# Patient Record
Sex: FEMALE | Race: WHITE | NOT HISPANIC OR LATINO | Employment: PART TIME | URBAN - METROPOLITAN AREA
[De-identification: names, ages, dates, MRNs, and addresses within clinical notes are randomized per-mention and may not be internally consistent; named-entity substitution may affect disease eponyms.]

---

## 2023-08-24 PROBLEM — E55.9 VITAMIN D DEFICIENCY: Status: ACTIVE | Noted: 2023-08-24

## 2023-08-24 PROBLEM — R07.81 PLEURITIC CHEST PAIN: Status: ACTIVE | Noted: 2023-08-24

## 2023-08-24 PROBLEM — G43.909 MIGRAINE: Status: ACTIVE | Noted: 2023-08-24

## 2023-08-24 PROBLEM — K44.9 GASTROESOPHAGEAL REFLUX DISEASE WITH HIATAL HERNIA: Status: ACTIVE | Noted: 2023-08-24

## 2023-08-24 PROBLEM — F32.A DEPRESSION: Status: ACTIVE | Noted: 2022-07-13

## 2023-08-24 PROBLEM — R43.2 DECREASED SENSE OF TASTE: Status: ACTIVE | Noted: 2023-08-24

## 2023-08-24 PROBLEM — R13.10 DYSPHAGIA: Status: ACTIVE | Noted: 2023-08-24

## 2023-08-24 PROBLEM — I47.10 PAROXYSMAL SUPRAVENTRICULAR TACHYCARDIA (CMS-HCC): Status: ACTIVE | Noted: 2023-08-24

## 2023-08-24 PROBLEM — F31.9 BIPOLAR DISORDER (MULTI): Status: ACTIVE | Noted: 2023-08-24

## 2023-08-24 PROBLEM — R53.82 CHRONIC FATIGUE: Status: ACTIVE | Noted: 2022-03-01

## 2023-08-24 PROBLEM — I49.3 VENTRICULAR PREMATURE CONTRACTIONS: Status: ACTIVE | Noted: 2023-08-24

## 2023-08-24 PROBLEM — K21.9 GASTROESOPHAGEAL REFLUX DISEASE WITH HIATAL HERNIA: Status: ACTIVE | Noted: 2023-08-24

## 2023-08-24 PROBLEM — G47.52 DREAM ENACTMENT BEHAVIOR: Status: ACTIVE | Noted: 2023-08-24

## 2023-08-24 PROBLEM — I47.11 INAPPROPRIATE SINUS TACHYCARDIA (CMS-HCC): Status: ACTIVE | Noted: 2023-08-24

## 2023-08-24 PROBLEM — G44.89 OTHER HEADACHE SYNDROME: Status: ACTIVE | Noted: 2023-08-24

## 2023-08-24 PROBLEM — R10.9 ABDOMINAL PAIN: Status: ACTIVE | Noted: 2023-08-24

## 2023-08-24 PROBLEM — E16.2 HYPOGLYCEMIA: Status: ACTIVE | Noted: 2023-08-24

## 2023-08-24 PROBLEM — J30.2 SEASONAL ALLERGIES: Status: ACTIVE | Noted: 2022-07-13

## 2023-08-24 PROBLEM — I20.9 ANGINA PECTORIS (CMS-HCC): Status: ACTIVE | Noted: 2023-08-24

## 2023-08-24 PROBLEM — G25.81 RLS (RESTLESS LEGS SYNDROME): Status: ACTIVE | Noted: 2023-08-24

## 2023-08-24 PROBLEM — R00.2 PALPITATIONS: Status: ACTIVE | Noted: 2023-08-24

## 2023-08-24 PROBLEM — I31.9 PERICARDITIS (HHS-HCC): Status: ACTIVE | Noted: 2023-08-24

## 2023-08-24 PROBLEM — G47.00 INSOMNIA: Status: ACTIVE | Noted: 2023-08-24

## 2023-08-24 PROBLEM — R61 NIGHT SWEATS: Status: ACTIVE | Noted: 2023-08-24

## 2023-08-24 PROBLEM — G47.19 EXCESSIVE DAYTIME SLEEPINESS: Status: ACTIVE | Noted: 2023-08-24

## 2023-08-24 PROBLEM — G47.33 OBSTRUCTIVE SLEEP APNEA SYNDROME: Status: ACTIVE | Noted: 2023-08-24

## 2023-08-24 PROBLEM — G47.30 SLEEP APNEA: Status: ACTIVE | Noted: 2023-08-24

## 2023-08-24 PROBLEM — F51.3 SLEEPWALKING: Status: ACTIVE | Noted: 2023-08-24

## 2023-08-24 PROBLEM — R06.02 SHORTNESS OF BREATH: Status: ACTIVE | Noted: 2023-08-24

## 2023-08-24 PROBLEM — Z99.89 DEPENDENCE ON OTHER ENABLING MACHINES AND DEVICES: Status: ACTIVE | Noted: 2023-08-24

## 2023-08-24 PROBLEM — R59.1 GENERALIZED LYMPHADENOPATHY: Status: ACTIVE | Noted: 2023-08-24

## 2023-08-24 PROBLEM — F41.8 ANXIETY WITH DEPRESSION: Status: ACTIVE | Noted: 2023-08-24

## 2023-08-24 PROBLEM — J45.990 EXERCISE-INDUCED ASTHMA (HHS-HCC): Status: ACTIVE | Noted: 2023-08-24

## 2023-08-24 PROBLEM — F41.9 ANXIETY: Status: ACTIVE | Noted: 2022-03-01

## 2023-08-24 PROBLEM — G43.119 INTRACTABLE MIGRAINE EQUIVALENT: Status: ACTIVE | Noted: 2023-08-24

## 2023-08-24 RX ORDER — ACETAMINOPHEN, ASPIRIN (NSAID), AND CAFFEINE 250; 250; 65 MG/1; MG/1; MG/1
2 TABLET, FILM COATED ORAL DAILY
COMMUNITY

## 2023-08-24 RX ORDER — ALBUTEROL SULFATE 90 UG/1
1-2 AEROSOL, METERED RESPIRATORY (INHALATION) EVERY 8 HOURS PRN
COMMUNITY
Start: 2019-11-25 | End: 2023-10-18 | Stop reason: ALTCHOICE

## 2023-08-24 RX ORDER — DROSPIRENONE AND ETHINYL ESTRADIOL 0.03MG-3MG
1 KIT ORAL DAILY
COMMUNITY
Start: 2022-02-21 | End: 2023-10-18 | Stop reason: ALTCHOICE

## 2023-08-24 RX ORDER — TRAZODONE HYDROCHLORIDE 50 MG/1
0.5 TABLET ORAL NIGHTLY PRN
COMMUNITY
End: 2023-10-18 | Stop reason: SINTOL

## 2023-08-24 RX ORDER — LORAZEPAM 0.5 MG/1
1 TABLET ORAL AS NEEDED
COMMUNITY
End: 2023-10-18 | Stop reason: SINTOL

## 2023-08-24 RX ORDER — DILTIAZEM HYDROCHLORIDE 120 MG/1
1 TABLET, FILM COATED ORAL 3 TIMES DAILY
COMMUNITY
End: 2023-10-18 | Stop reason: ALTCHOICE

## 2023-08-24 RX ORDER — LEVONORGESTREL 19.5 MG/1
INTRAUTERINE DEVICE INTRAUTERINE
COMMUNITY
Start: 2022-09-16 | End: 2023-10-18 | Stop reason: ALTCHOICE

## 2023-08-24 RX ORDER — LEVOCETIRIZINE DIHYDROCHLORIDE 5 MG/1
1 TABLET, FILM COATED ORAL DAILY
COMMUNITY
Start: 2022-07-13 | End: 2023-10-18 | Stop reason: ALTCHOICE

## 2023-08-24 RX ORDER — NEBIVOLOL 5 MG/1
1 TABLET ORAL DAILY
COMMUNITY
End: 2023-10-18 | Stop reason: ALTCHOICE

## 2023-08-24 RX ORDER — ESCITALOPRAM OXALATE 5 MG/1
1 TABLET ORAL DAILY
COMMUNITY
Start: 2022-07-13 | End: 2023-10-18 | Stop reason: ALTCHOICE

## 2023-08-24 RX ORDER — BUTALBITAL, ACETAMINOPHEN AND CAFFEINE 300; 40; 50 MG/1; MG/1; MG/1
1 CAPSULE ORAL EVERY 6 HOURS PRN
COMMUNITY
End: 2023-10-18 | Stop reason: SINTOL

## 2023-08-24 RX ORDER — CYCLOBENZAPRINE HCL 10 MG
10 TABLET ORAL
COMMUNITY
Start: 2023-01-17 | End: 2023-10-18 | Stop reason: SINTOL

## 2023-08-24 RX ORDER — TOBRAMYCIN 3 MG/ML
SOLUTION/ DROPS OPHTHALMIC
COMMUNITY
Start: 2022-11-15 | End: 2023-10-18 | Stop reason: ALTCHOICE

## 2023-08-24 RX ORDER — MELOXICAM 7.5 MG/1
TABLET ORAL
COMMUNITY
Start: 2023-01-17 | End: 2023-10-18 | Stop reason: ALTCHOICE

## 2023-08-24 RX ORDER — ESCITALOPRAM OXALATE 10 MG/1
1 TABLET, FILM COATED ORAL DAILY
COMMUNITY
End: 2023-10-18 | Stop reason: ALTCHOICE

## 2023-10-18 ENCOUNTER — TELEMEDICINE (OUTPATIENT)
Dept: OBSTETRICS AND GYNECOLOGY | Facility: CLINIC | Age: 38
End: 2023-10-18
Payer: COMMERCIAL

## 2023-10-18 DIAGNOSIS — Z31.9 DESIRE FOR PREGNANCY: Primary | ICD-10-CM

## 2023-10-18 DIAGNOSIS — N91.3 PRIMARY OLIGOMENORRHEA: ICD-10-CM

## 2023-10-18 DIAGNOSIS — R52 PAIN: ICD-10-CM

## 2023-10-18 DIAGNOSIS — Z31.41 FERTILITY TESTING: ICD-10-CM

## 2023-10-18 PROCEDURE — 99213 OFFICE O/P EST LOW 20 MIN: CPT | Mod: 95 | Performed by: OBSTETRICS & GYNECOLOGY

## 2023-10-18 PROCEDURE — 99213 OFFICE O/P EST LOW 20 MIN: CPT | Performed by: OBSTETRICS & GYNECOLOGY

## 2023-10-18 RX ORDER — LETROZOLE 2.5 MG/1
TABLET, FILM COATED ORAL
Qty: 30 TABLET | Refills: 3 | Status: SHIPPED | OUTPATIENT
Start: 2023-10-18

## 2023-10-18 RX ORDER — PROGESTERONE 200 MG/1
CAPSULE ORAL
Qty: 36 CAPSULE | Refills: 3 | Status: SHIPPED | OUTPATIENT
Start: 2023-10-18

## 2023-10-18 RX ORDER — IBUPROFEN 600 MG/1
600 TABLET ORAL EVERY 6 HOURS PRN
Qty: 30 TABLET | Refills: 0 | Status: SHIPPED | OUTPATIENT
Start: 2023-10-18 | End: 2023-10-28

## 2023-10-18 RX ORDER — ACETAMINOPHEN AND CODEINE PHOSPHATE 300; 30 MG/1; MG/1
1 TABLET ORAL 2 TIMES DAILY PRN
Qty: 6 TABLET | Refills: 0 | Status: SHIPPED | OUTPATIENT
Start: 2023-10-18 | End: 2023-10-21

## 2023-10-18 RX ORDER — NUT.TX FOR PKU WITH IRON NO.13 5G-36KCAL
POWDER IN PACKET (EA) ORAL
COMMUNITY
End: 2024-05-16 | Stop reason: ALTCHOICE
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
## 2023-10-19 ENCOUNTER — TELEPHONE (OUTPATIENT)
Dept: OBSTETRICS AND GYNECOLOGY | Facility: CLINIC | Age: 38
End: 2023-10-19
Payer: COMMERCIAL

## 2023-10-19 DIAGNOSIS — Z01.818 ENCOUNTER FOR PREADMISSION TESTING: Primary | ICD-10-CM

## 2023-10-19 DIAGNOSIS — Z31.9 DESIRE FOR PREGNANCY: ICD-10-CM

## 2023-10-19 DIAGNOSIS — N91.3 PRIMARY OLIGOMENORRHEA: ICD-10-CM

## 2023-10-20 NOTE — TELEPHONE ENCOUNTER
She can start with next cycle - Progesterone days 15 through 26 and Letrozole days 3 through 7. Sent to pharmacy for her.

## 2023-10-20 NOTE — TELEPHONE ENCOUNTER
Nurse contacted pt regarding scheduling her an appointment for EMB, pap smear and also mailing out order (semen analysis) for her . While scheduling her appointment she questioned if this is with Dr. Amaya. Told pt that seeing Dr. Amaya is not on my sheet given by Dr. Morgan, but to mail instructions for HSG to patient, mail semen analysis order to her -Emmanuel Mukherjee with instructions for lab prep and schedule EMB with pap smear. Told her a message will be sent to Dr. Morgan. EMB with pap scheduled for 12/7/2023 with SG. Order and instructions mailed.

## 2023-10-25 NOTE — TELEPHONE ENCOUNTER
Radiology called back stating that they were able to schedule the patient for 11/13 but states she needs a neg HCG done at least 3 d prior to testing. Please place order for that.

## 2023-10-25 NOTE — TELEPHONE ENCOUNTER
Radiology scheduling just called and stated that the Hysterosalpingogram is not something they are able to schedule that she would need to see G[facility] for them to order and schedule. Please refer patient.

## 2023-11-09 PROBLEM — J31.0 CHRONIC RHINITIS: Status: ACTIVE | Noted: 2023-11-09

## 2023-11-10 ENCOUNTER — LAB (OUTPATIENT)
Dept: LAB | Facility: LAB | Age: 38
End: 2023-11-10
Payer: COMMERCIAL

## 2023-11-10 DIAGNOSIS — Z01.818 ENCOUNTER FOR PREADMISSION TESTING: ICD-10-CM

## 2023-11-10 LAB — HCG SERPL-ACNC: <1 MIU/ML

## 2023-11-10 PROCEDURE — 36415 COLL VENOUS BLD VENIPUNCTURE: CPT

## 2023-11-10 PROCEDURE — 84702 CHORIONIC GONADOTROPIN TEST: CPT

## 2023-11-13 ENCOUNTER — HOSPITAL ENCOUNTER (OUTPATIENT)
Dept: RADIOLOGY | Facility: HOSPITAL | Age: 38
Discharge: HOME | End: 2023-11-13
Payer: COMMERCIAL

## 2023-11-13 DIAGNOSIS — N91.3 PRIMARY OLIGOMENORRHEA: ICD-10-CM

## 2023-11-13 PROCEDURE — 2550000001 HC RX 255 CONTRASTS: Performed by: OBSTETRICS & GYNECOLOGY

## 2023-11-13 PROCEDURE — 74740 X-RAY FEMALE GENITAL TRACT: CPT

## 2023-11-13 PROCEDURE — 2720000007 HC OR 272 NO HCPCS

## 2023-11-13 RX ADMIN — IOHEXOL 5 ML: 240 INJECTION, SOLUTION INTRATHECAL; INTRAVASCULAR; INTRAVENOUS; ORAL at 11:17

## 2023-12-06 ENCOUNTER — APPOINTMENT (OUTPATIENT)
Dept: OBSTETRICS AND GYNECOLOGY | Facility: CLINIC | Age: 38
End: 2023-12-06
Payer: COMMERCIAL

## 2023-12-07 ENCOUNTER — PROCEDURE VISIT (OUTPATIENT)
Dept: OBSTETRICS AND GYNECOLOGY | Facility: CLINIC | Age: 38
End: 2023-12-07
Payer: COMMERCIAL

## 2023-12-07 VITALS — BODY MASS INDEX: 23.8 KG/M2 | WEIGHT: 143 LBS | DIASTOLIC BLOOD PRESSURE: 82 MMHG | SYSTOLIC BLOOD PRESSURE: 120 MMHG

## 2023-12-07 DIAGNOSIS — N91.3 PRIMARY OLIGOMENORRHEA: Primary | ICD-10-CM

## 2023-12-07 DIAGNOSIS — F41.9 ANXIETY: ICD-10-CM

## 2023-12-07 LAB — PREGNANCY TEST URINE, POC: NEGATIVE

## 2023-12-07 PROCEDURE — 58100 BIOPSY OF UTERUS LINING: CPT | Mod: 51 | Performed by: OBSTETRICS & GYNECOLOGY

## 2023-12-07 PROCEDURE — 58100 BIOPSY OF UTERUS LINING: CPT | Performed by: OBSTETRICS & GYNECOLOGY

## 2023-12-07 PROCEDURE — 88305 TISSUE EXAM BY PATHOLOGIST: CPT | Mod: TC,SUR | Performed by: OBSTETRICS & GYNECOLOGY

## 2023-12-07 PROCEDURE — 88305 TISSUE EXAM BY PATHOLOGIST: CPT | Performed by: PATHOLOGY

## 2023-12-07 RX ORDER — ALPRAZOLAM 0.25 MG/1
TABLET ORAL
Qty: 10 TABLET | Refills: 0 | Status: SHIPPED | OUTPATIENT
Start: 2023-12-07

## 2023-12-07 ASSESSMENT — ENCOUNTER SYMPTOMS
DEPRESSION: 0
LOSS OF SENSATION IN FEET: 0
OCCASIONAL FEELINGS OF UNSTEADINESS: 0

## 2023-12-07 ASSESSMENT — PAIN SCALES - GENERAL: PAINLEVEL: 0-NO PAIN

## 2023-12-07 ASSESSMENT — PATIENT HEALTH QUESTIONNAIRE - PHQ9
SUM OF ALL RESPONSES TO PHQ9 QUESTIONS 1 & 2: 0
1. LITTLE INTEREST OR PLEASURE IN DOING THINGS: NOT AT ALL
2. FEELING DOWN, DEPRESSED OR HOPELESS: NOT AT ALL

## 2023-12-08 NOTE — PROGRESS NOTES
Patient ID: Taty Sosa is a 38 y.o. female.    Endometrial biopsy    Date/Time: 12/7/2023 7:01 PM    Performed by: Elvira Morgan DO  Authorized by: Elvira Morgan DO    Consent:     Consent obtained: written    Consent given by: patient    Risks discussed: bleeding, pain, need for repeat procedure and infection    Alternatives discussed: observation, alternative treatment and referral    Patient agrees, verbalizes understanding, and wants to proceed: yes    Indications:     Indications: other menstrual disorder    Pre-procedure:     Urine pregnancy test: negative      Premeds: acetaminophen and NSAID    Procedure:     A bimanual exam was performed: yes      Uterus position: midposition    Prepped with: Betadine    Tenaculum used: yes      A local block was performed: yes      Block method: paracervical block      Local anesthetic: lidocaine 2% w/o epi    Amount used (mL): 3    Cervix dilated: no      Number of passes: 3  Findings:     Cervix: normal      Uterus depth by sound (cm): 7    Specimen collected: low volume sample collected      Patient tolerance: tolerated with difficulty    Patient tolerance comment: Anxiety/Pain

## 2023-12-13 LAB
LABORATORY COMMENT REPORT: NORMAL
PATH REPORT.FINAL DX SPEC: NORMAL
PATH REPORT.GROSS SPEC: NORMAL
PATH REPORT.RELEVANT HX SPEC: NORMAL
PATH REPORT.TOTAL CANCER: NORMAL

## 2024-02-08 ENCOUNTER — OFFICE VISIT (OUTPATIENT)
Dept: SLEEP MEDICINE | Facility: CLINIC | Age: 39
End: 2024-02-08
Payer: COMMERCIAL

## 2024-02-08 VITALS
SYSTOLIC BLOOD PRESSURE: 96 MMHG | HEART RATE: 62 BPM | WEIGHT: 137 LBS | HEIGHT: 65 IN | DIASTOLIC BLOOD PRESSURE: 64 MMHG | BODY MASS INDEX: 22.82 KG/M2 | OXYGEN SATURATION: 98 %

## 2024-02-08 DIAGNOSIS — G25.81 RLS (RESTLESS LEGS SYNDROME): ICD-10-CM

## 2024-02-08 DIAGNOSIS — F51.3 SLEEPWALKING: ICD-10-CM

## 2024-02-08 DIAGNOSIS — G47.33 OBSTRUCTIVE SLEEP APNEA SYNDROME: ICD-10-CM

## 2024-02-08 DIAGNOSIS — G47.10 HYPERSOMNOLENCE DISORDER: ICD-10-CM

## 2024-02-08 DIAGNOSIS — F31.9 BIPOLAR AFFECTIVE DISORDER, REMISSION STATUS UNSPECIFIED (MULTI): ICD-10-CM

## 2024-02-08 DIAGNOSIS — J31.0 CHRONIC RHINITIS: ICD-10-CM

## 2024-02-08 DIAGNOSIS — G47.52 DREAM ENACTMENT BEHAVIOR: Primary | ICD-10-CM

## 2024-02-08 PROCEDURE — 99214 OFFICE O/P EST MOD 30 MIN: CPT | Performed by: INTERNAL MEDICINE

## 2024-02-08 PROCEDURE — 1036F TOBACCO NON-USER: CPT | Performed by: INTERNAL MEDICINE

## 2024-02-08 RX ORDER — AZELASTINE 1 MG/ML
1 SPRAY, METERED NASAL 2 TIMES DAILY
Qty: 36 ML | Refills: 2 | Status: SHIPPED | OUTPATIENT
Start: 2024-02-08

## 2024-02-08 RX ORDER — FLUTICASONE PROPIONATE 50 MCG
2 SPRAY, SUSPENSION (ML) NASAL NIGHTLY
Qty: 16 G | Refills: 2 | Status: SHIPPED | OUTPATIENT
Start: 2024-02-08

## 2024-02-08 ASSESSMENT — SLEEP AND FATIGUE QUESTIONNAIRES
ESS-CHAD TOTAL SCORE: 13
HOW LIKELY ARE YOU TO NOD OFF OR FALL ASLEEP WHILE SITTING QUIETLY AFTER LUNCH WITHOUT ALCOHOL: HIGH CHANCE OF DOZING
HOW LIKELY ARE YOU TO NOD OFF OR FALL ASLEEP WHILE WATCHING TV: HIGH CHANCE OF DOZING
HOW LIKELY ARE YOU TO NOD OFF OR FALL ASLEEP WHEN YOU ARE A PASSENGER IN A CAR FOR AN HOUR WITHOUT A BREAK: WOULD NEVER DOZE
HOW LIKELY ARE YOU TO NOD OFF OR FALL ASLEEP WHILE LYING DOWN TO REST IN THE AFTERNOON WHEN CIRCUMSTANCES PERMIT: HIGH CHANCE OF DOZING
HOW LIKELY ARE YOU TO NOD OFF OR FALL ASLEEP IN A CAR, WHILE STOPPED FOR A FEW MINUTES IN TRAFFIC: WOULD NEVER DOZE
HOW LIKELY ARE YOU TO NOD OFF OR FALL ASLEEP WHILE SITTING AND READING: HIGH CHANCE OF DOZING
SITING INACTIVE IN A PUBLIC PLACE LIKE A CLASS ROOM OR A MOVIE THEATER: SLIGHT CHANCE OF DOZING
HOW LIKELY ARE YOU TO NOD OFF OR FALL ASLEEP WHILE SITTING AND TALKING TO SOMEONE: WOULD NEVER DOZE

## 2024-02-08 ASSESSMENT — PAIN SCALES - GENERAL: PAINLEVEL: 0-NO PAIN
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
## 2024-02-08 NOTE — PATIENT INSTRUCTIONS
Call  the  Sleep Center ([phone]) to speak with a sleep testing center  to book your overnight sleep study procedure at one of our adult and pediatric-friendly sleep labs. Overnight sleep studies may be scheduled on a weekday or weekend.      We have child-life services on a case-by-case basis at the [facility] location. We also perform daytime testing for shift workers on a case by case basis.     For the study: Bring usual medications and nightly routine items for your sleep study.  You may wish to bring a snack and nightly routine items you feel would be important to help you sleep (e.g. favorite pillow). Bring your sleep logs/requested paperwork to your sleep study appointment.     Results of your sleep study will be given to the ordering clinician. Please contact their office for results or followup as directed by your clinician. For additional information about the sleep medicine services, please call [phone].     Locations for sleep studies are [facility] ([facility]), [facility], [place], [place], [place], [place], [place], and [place].

## 2024-02-08 NOTE — ASSESSMENT & PLAN NOTE
Hypersomnolence with parasomnia and well treated obstructive sleep apnea.  Recommend in lab PSG on current CPAP settings with parasomnia protocol with extended EEG and upper and lower limb leads is recommended

## 2024-03-24 ENCOUNTER — HOSPITAL ENCOUNTER (EMERGENCY)
Facility: HOSPITAL | Age: 39
Discharge: OTHER INSTITUTION | End: 2024-03-24
Payer: COMMERCIAL

## 2024-03-24 PROCEDURE — 4500999001 HC ED NO CHARGE

## 2024-04-02 ENCOUNTER — APPOINTMENT (OUTPATIENT)
Dept: ENDOCRINOLOGY | Facility: CLINIC | Age: 39
End: 2024-04-02
Payer: COMMERCIAL

## 2024-04-08 ASSESSMENT — LIFESTYLE VARIABLES
TOBACCO_USE: NO
HISTORY_ALCOHOL_USE: YES

## 2024-04-09 ENCOUNTER — CONSULT (OUTPATIENT)
Dept: ENDOCRINOLOGY | Facility: CLINIC | Age: 39
End: 2024-04-09
Payer: COMMERCIAL

## 2024-04-09 VITALS
TEMPERATURE: 98.1 F | HEART RATE: 98 BPM | SYSTOLIC BLOOD PRESSURE: 117 MMHG | WEIGHT: 138 LBS | BODY MASS INDEX: 22.99 KG/M2 | DIASTOLIC BLOOD PRESSURE: 59 MMHG | HEIGHT: 65 IN

## 2024-04-09 DIAGNOSIS — Z13.71 SCREENING FOR GENETIC DISEASE CARRIER STATUS: ICD-10-CM

## 2024-04-09 DIAGNOSIS — G47.30 SLEEP APNEA, UNSPECIFIED TYPE: ICD-10-CM

## 2024-04-09 DIAGNOSIS — Z11.3 SCREENING FOR STDS (SEXUALLY TRANSMITTED DISEASES): ICD-10-CM

## 2024-04-09 DIAGNOSIS — Z31.9 DESIRE FOR PREGNANCY: ICD-10-CM

## 2024-04-09 DIAGNOSIS — Z31.41 FERTILITY TESTING: ICD-10-CM

## 2024-04-09 DIAGNOSIS — Z11.59 ENCOUNTER FOR SCREENING FOR OTHER VIRAL DISEASES: ICD-10-CM

## 2024-04-09 DIAGNOSIS — Z01.83 ENCOUNTER FOR RH BLOOD TYPING: ICD-10-CM

## 2024-04-09 DIAGNOSIS — Z13.29 SCREENING FOR THYROID DISORDER: ICD-10-CM

## 2024-04-09 DIAGNOSIS — G89.29 CHRONIC PELVIC PAIN IN FEMALE: Primary | ICD-10-CM

## 2024-04-09 DIAGNOSIS — R10.2 CHRONIC PELVIC PAIN IN FEMALE: Primary | ICD-10-CM

## 2024-04-09 DIAGNOSIS — N91.3 PRIMARY OLIGOMENORRHEA: ICD-10-CM

## 2024-04-09 LAB
ABO GROUP (TYPE) IN BLOOD: NORMAL
ANTIBODY SCREEN: NORMAL
HBV SURFACE AG SERPL QL IA: NONREACTIVE
HCV AB SER QL: NONREACTIVE
HIV 1+2 AB+HIV1 P24 AG SERPL QL IA: NONREACTIVE
RH FACTOR (ANTIGEN D): NORMAL
RUBV IGG SERPL IA-ACNC: 2.1 IA
RUBV IGG SERPL QL IA: POSITIVE
TREPONEMA PALLIDUM IGG+IGM AB [PRESENCE] IN SERUM OR PLASMA BY IMMUNOASSAY: NONREACTIVE
TSH SERPL-ACNC: 0.68 MIU/L (ref 0.44–3.98)
VARICELLA ZOSTER IGG INDEX: 2.7 IA
VZV IGG SER QL IA: POSITIVE

## 2024-04-09 PROCEDURE — 83516 IMMUNOASSAY NONANTIBODY: CPT

## 2024-04-09 PROCEDURE — 86803 HEPATITIS C AB TEST: CPT

## 2024-04-09 PROCEDURE — 84443 ASSAY THYROID STIM HORMONE: CPT

## 2024-04-09 PROCEDURE — 86850 RBC ANTIBODY SCREEN: CPT

## 2024-04-09 PROCEDURE — 99204 OFFICE O/P NEW MOD 45 MIN: CPT | Performed by: NURSE PRACTITIONER

## 2024-04-09 PROCEDURE — 87389 HIV-1 AG W/HIV-1&-2 AB AG IA: CPT

## 2024-04-09 PROCEDURE — 86787 VARICELLA-ZOSTER ANTIBODY: CPT

## 2024-04-09 PROCEDURE — 86901 BLOOD TYPING SEROLOGIC RH(D): CPT

## 2024-04-09 PROCEDURE — 86780 TREPONEMA PALLIDUM: CPT

## 2024-04-09 PROCEDURE — 86317 IMMUNOASSAY INFECTIOUS AGENT: CPT

## 2024-04-09 PROCEDURE — 99214 OFFICE O/P EST MOD 30 MIN: CPT | Performed by: NURSE PRACTITIONER

## 2024-04-09 PROCEDURE — 86900 BLOOD TYPING SEROLOGIC ABO: CPT

## 2024-04-09 PROCEDURE — 87340 HEPATITIS B SURFACE AG IA: CPT

## 2024-04-09 PROCEDURE — 36415 COLL VENOUS BLD VENIPUNCTURE: CPT

## 2024-04-09 PROCEDURE — 87800 DETECT AGNT MULT DNA DIREC: CPT

## 2024-04-09 ASSESSMENT — PATIENT HEALTH QUESTIONNAIRE - PHQ9
SUM OF ALL RESPONSES TO PHQ9 QUESTIONS 1 AND 2: 0
1. LITTLE INTEREST OR PLEASURE IN DOING THINGS: NOT AT ALL
2. FEELING DOWN, DEPRESSED OR HOPELESS: NOT AT ALL

## 2024-04-09 ASSESSMENT — ENCOUNTER SYMPTOMS
OCCASIONAL FEELINGS OF UNSTEADINESS: 0
DEPRESSION: 0
LOSS OF SENSATION IN FEET: 0

## 2024-04-09 ASSESSMENT — COLUMBIA-SUICIDE SEVERITY RATING SCALE - C-SSRS
2. HAVE YOU ACTUALLY HAD ANY THOUGHTS OF KILLING YOURSELF?: NO
1. IN THE PAST MONTH, HAVE YOU WISHED YOU WERE DEAD OR WISHED YOU COULD GO TO SLEEP AND NOT WAKE UP?: NO
6. HAVE YOU EVER DONE ANYTHING, STARTED TO DO ANYTHING, OR PREPARED TO DO ANYTHING TO END YOUR LIFE?: NO

## 2024-04-09 ASSESSMENT — PAIN SCALES - GENERAL: PAINLEVEL: 5
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
## 2024-04-10 LAB
C TRACH RRNA SPEC QL NAA+PROBE: NEGATIVE
N GONORRHOEA DNA SPEC QL PROBE+SIG AMP: NEGATIVE

## 2024-04-11 ENCOUNTER — LAB (OUTPATIENT)
Dept: LAB | Facility: LAB | Age: 39
End: 2024-04-11
Payer: COMMERCIAL

## 2024-04-11 DIAGNOSIS — G47.10 HYPERSOMNOLENCE DISORDER: ICD-10-CM

## 2024-04-11 LAB
AMPHETAMINES UR QL SCN: NORMAL
BARBITURATES UR QL SCN: NORMAL
BENZODIAZ UR QL SCN: NORMAL
BZE UR QL SCN: NORMAL
CANNABINOIDS UR QL SCN: NORMAL
FENTANYL+NORFENTANYL UR QL SCN: NORMAL
METHADONE UR QL SCN: NORMAL
OPIATES UR QL SCN: NORMAL
OXYCODONE+OXYMORPHONE UR QL SCN: NORMAL
PCP UR QL SCN: NORMAL

## 2024-04-11 PROCEDURE — 80307 DRUG TEST PRSMV CHEM ANLYZR: CPT

## 2024-04-12 ENCOUNTER — TELEPHONE (OUTPATIENT)
Dept: ENDOCRINOLOGY | Facility: CLINIC | Age: 39
End: 2024-04-12
Payer: COMMERCIAL

## 2024-04-12 LAB — MIS SERPL-MCNC: 1.89 NG/ML (ref 0.18–11.71)

## 2024-04-12 NOTE — TELEPHONE ENCOUNTER
Reason for call: Call Back  Notes: Pt was told by her doctor she's unable to be scheduled for a PAP until August but does not want to delay her cycle.    Phone call to patient. She is hoping to get in for a PAP sooner than August to proceed with treatment. Recommend calling central scheduling to see if there is any sooner appt. Will also reach out to Moriah for suggestions to get her in sooner.   Yisel MAGANA  04/12/24   3:20 PM

## 2024-04-16 ENCOUNTER — APPOINTMENT (OUTPATIENT)
Dept: RADIOLOGY | Facility: HOSPITAL | Age: 39
End: 2024-04-16
Payer: COMMERCIAL

## 2024-04-16 ENCOUNTER — HOSPITAL ENCOUNTER (EMERGENCY)
Facility: HOSPITAL | Age: 39
Discharge: HOME | End: 2024-04-16
Attending: EMERGENCY MEDICINE
Payer: COMMERCIAL

## 2024-04-16 ENCOUNTER — APPOINTMENT (OUTPATIENT)
Dept: CARDIOLOGY | Facility: HOSPITAL | Age: 39
End: 2024-04-16
Payer: COMMERCIAL

## 2024-04-16 VITALS
SYSTOLIC BLOOD PRESSURE: 128 MMHG | BODY MASS INDEX: 21.99 KG/M2 | HEIGHT: 65 IN | DIASTOLIC BLOOD PRESSURE: 70 MMHG | HEART RATE: 68 BPM | RESPIRATION RATE: 18 BRPM | OXYGEN SATURATION: 100 % | TEMPERATURE: 37.1 F | WEIGHT: 132 LBS

## 2024-04-16 DIAGNOSIS — R07.81 PLEURITIC CHEST PAIN: Primary | ICD-10-CM

## 2024-04-16 DIAGNOSIS — M94.0 COSTOCHONDRITIS, ACUTE: ICD-10-CM

## 2024-04-16 LAB
ALBUMIN SERPL BCP-MCNC: 5.1 G/DL (ref 3.4–5)
ANION GAP SERPL CALC-SCNC: 17 MMOL/L (ref 10–20)
ATRIAL RATE: 70 BPM
BASOPHILS # BLD AUTO: 0.05 X10*3/UL (ref 0–0.1)
BASOPHILS NFR BLD AUTO: 0.8 %
BUN SERPL-MCNC: 14 MG/DL (ref 6–23)
CALCIUM SERPL-MCNC: 10.1 MG/DL (ref 8.6–10.3)
CARDIAC TROPONIN I PNL SERPL HS: 3 NG/L (ref 0–13)
CHLORIDE SERPL-SCNC: 102 MMOL/L (ref 98–107)
CO2 SERPL-SCNC: 27 MMOL/L (ref 21–32)
CREAT SERPL-MCNC: 0.93 MG/DL (ref 0.5–1.05)
D DIMER PPP FEU-MCNC: 228 NG/ML FEU
EGFRCR SERPLBLD CKD-EPI 2021: 82 ML/MIN/1.73M*2
EOSINOPHIL # BLD AUTO: 0.31 X10*3/UL (ref 0–0.7)
EOSINOPHIL NFR BLD AUTO: 4.9 %
ERYTHROCYTE [DISTWIDTH] IN BLOOD BY AUTOMATED COUNT: 11.9 % (ref 11.5–14.5)
GLUCOSE SERPL-MCNC: 84 MG/DL (ref 74–99)
HCT VFR BLD AUTO: 38.1 % (ref 36–46)
HGB BLD-MCNC: 12.9 G/DL (ref 12–16)
IMM GRANULOCYTES # BLD AUTO: 0.01 X10*3/UL (ref 0–0.7)
IMM GRANULOCYTES NFR BLD AUTO: 0.2 % (ref 0–0.9)
LYMPHOCYTES # BLD AUTO: 1.58 X10*3/UL (ref 1.2–4.8)
LYMPHOCYTES NFR BLD AUTO: 25 %
MCH RBC QN AUTO: 31.3 PG (ref 26–34)
MCHC RBC AUTO-ENTMCNC: 33.9 G/DL (ref 32–36)
MCV RBC AUTO: 93 FL (ref 80–100)
MONOCYTES # BLD AUTO: 0.49 X10*3/UL (ref 0.1–1)
MONOCYTES NFR BLD AUTO: 7.8 %
NEUTROPHILS # BLD AUTO: 3.87 X10*3/UL (ref 1.2–7.7)
NEUTROPHILS NFR BLD AUTO: 61.3 %
NRBC BLD-RTO: 0 /100 WBCS (ref 0–0)
P AXIS: 88 DEGREES
P OFFSET: 182 MS
P ONSET: 151 MS
PHOSPHATE SERPL-MCNC: 3.3 MG/DL (ref 2.5–4.9)
PLATELET # BLD AUTO: 297 X10*3/UL (ref 150–450)
POTASSIUM SERPL-SCNC: 3.5 MMOL/L (ref 3.5–5.3)
PR INTERVAL: 144 MS
Q ONSET: 223 MS
QRS COUNT: 12 BEATS
QRS DURATION: 68 MS
QT INTERVAL: 394 MS
QTC CALCULATION(BAZETT): 425 MS
QTC FREDERICIA: 415 MS
R AXIS: 80 DEGREES
RBC # BLD AUTO: 4.12 X10*6/UL (ref 4–5.2)
SODIUM SERPL-SCNC: 142 MMOL/L (ref 136–145)
T AXIS: 60 DEGREES
T OFFSET: 420 MS
VENTRICULAR RATE: 70 BPM
WBC # BLD AUTO: 6.3 X10*3/UL (ref 4.4–11.3)

## 2024-04-16 PROCEDURE — 93005 ELECTROCARDIOGRAM TRACING: CPT

## 2024-04-16 PROCEDURE — 36415 COLL VENOUS BLD VENIPUNCTURE: CPT

## 2024-04-16 PROCEDURE — 99283 EMERGENCY DEPT VISIT LOW MDM: CPT | Mod: 25

## 2024-04-16 PROCEDURE — 80069 RENAL FUNCTION PANEL: CPT

## 2024-04-16 PROCEDURE — 85025 COMPLETE CBC W/AUTO DIFF WBC: CPT

## 2024-04-16 PROCEDURE — 71046 X-RAY EXAM CHEST 2 VIEWS: CPT

## 2024-04-16 PROCEDURE — 2500000001 HC RX 250 WO HCPCS SELF ADMINISTERED DRUGS (ALT 637 FOR MEDICARE OP)

## 2024-04-16 PROCEDURE — 84484 ASSAY OF TROPONIN QUANT: CPT

## 2024-04-16 PROCEDURE — 71046 X-RAY EXAM CHEST 2 VIEWS: CPT | Mod: FOREIGN READ | Performed by: RADIOLOGY

## 2024-04-16 PROCEDURE — 85379 FIBRIN DEGRADATION QUANT: CPT

## 2024-04-16 RX ORDER — METHOCARBAMOL 500 MG/1
500 TABLET, FILM COATED ORAL ONCE
Status: COMPLETED | OUTPATIENT
Start: 2024-04-16 | End: 2024-04-16

## 2024-04-16 RX ORDER — METHOCARBAMOL 500 MG/1
500 TABLET, FILM COATED ORAL 3 TIMES DAILY
Qty: 21 TABLET | Refills: 0 | Status: SHIPPED | OUTPATIENT
Start: 2024-04-16 | End: 2024-04-23

## 2024-04-16 RX ORDER — NAPROXEN 500 MG/1
500 TABLET ORAL
Qty: 30 TABLET | Refills: 0 | Status: SHIPPED | OUTPATIENT
Start: 2024-04-16 | End: 2024-05-01

## 2024-04-16 RX ADMIN — METHOCARBAMOL TABLETS 500 MG: 500 TABLET, COATED ORAL at 10:52

## 2024-04-16 ASSESSMENT — PAIN - FUNCTIONAL ASSESSMENT: PAIN_FUNCTIONAL_ASSESSMENT: 0-10

## 2024-04-16 ASSESSMENT — PAIN DESCRIPTION - DESCRIPTORS: DESCRIPTORS: PRESSURE

## 2024-04-16 ASSESSMENT — COLUMBIA-SUICIDE SEVERITY RATING SCALE - C-SSRS
1. IN THE PAST MONTH, HAVE YOU WISHED YOU WERE DEAD OR WISHED YOU COULD GO TO SLEEP AND NOT WAKE UP?: NO
2. HAVE YOU ACTUALLY HAD ANY THOUGHTS OF KILLING YOURSELF?: NO
6. HAVE YOU EVER DONE ANYTHING, STARTED TO DO ANYTHING, OR PREPARED TO DO ANYTHING TO END YOUR LIFE?: NO

## 2024-04-16 ASSESSMENT — PAIN SCALES - GENERAL: PAINLEVEL_OUTOF10: 7

## 2024-04-16 ASSESSMENT — PAIN DESCRIPTION - LOCATION: LOCATION: BACK

## 2024-04-16 NOTE — DISCHARGE INSTRUCTIONS
You were seen today for chest pain and shortness of breath, you likely have a degree of pleurisy or pleuritic chest pain.  We did test to look for a pulmonary embolism given your recent travel, which was negative.  This is reassuring, however, does not necessarily give us a clear answer as to why you are having chest pain.  If you are still having chest pain, I would recommend you follow-up with your primary care doctor for continued evaluation and treatment.  I will send home with a prescription for muscle relaxers as you felt that this helped here in the emergency department

## 2024-04-16 NOTE — ED PROVIDER NOTES
HPI   Chief Complaint   Patient presents with    Back Pain    Shortness of Breath       38-year-old female past medical history of a recent URI and travel to [place] who presents today for chest pain and shortness of breath.  Patient states that she returned from [place] on the 20th 21 March, and after returning was very ill.  She states that she has been feeling better since then, however, has been having chest pain with deep breathing as well as back pain.  She does also endorse a residual cough, but no sore throat difficulty swallowing or speaking.  No sore throat, fever, neck stiffness.  She does endorse a decrease in her appetite, but no difficulty eating.  Denies any numbness weakness tingling in arms or legs.  She states that she has previously been seen in urgent care for her symptoms.  Given the fact that she had continued symptoms, she was sent here for further evaluation for possible PE.  She denies any history of blood clots, no hormone therapy, no history of cancer.                          Hague Coma Scale Score: 15                     Patient History   Past Medical History:   Diagnosis Date    Anxiety     Asthma ([facility])     Depression     GERD (gastroesophageal reflux disease)     Migraine     CHUCK on CPAP     Palpitations     PTSD (post-traumatic stress disorder)     RLS (restless legs syndrome)     Tachycardia     Vitamin D deficiency      Past Surgical History:   Procedure Laterality Date    LAPAROSCOPIC NISSEN FUNDOPLICATION N/A 08/07/2017    MOUTH SURGERY      OTHER SURGICAL HISTORY  01/12/2016    EP Study     Family History   Problem Relation Name Age of Onset    Mental illness Mother 65     Hypertension Mother 65     Depression Mother 65     Anxiety disorder Mother 65     Other (prediabetes) Mother 65     Hypertension Father 62     Depression Father 62     Anxiety disorder Father 62     Stroke Father 62 60    Mental illness Father 62     Hypertension Other      Heart disease Other       Social  History     Tobacco Use    Smoking status: Never     Passive exposure: Never    Smokeless tobacco: Never   Vaping Use    Vaping status: Never Used   Substance Use Topics    Alcohol use: Yes     Comment: Once monthly    Drug use: Never       Physical Exam   ED Triage Vitals [04/16/24 0934]   Temperature Heart Rate Respirations BP   36.2 °C (97.2 °F) 80 18 128/84      Pulse Ox Temp Source Heart Rate Source Patient Position   100 % Temporal -- --      BP Location FiO2 (%)     -- --       Physical Exam  Constitutional:       General: She is not in acute distress.     Appearance: She is well-developed and normal weight. She is not toxic-appearing or diaphoretic.      Interventions: She is not intubated.  HENT:      Head: Normocephalic and atraumatic.      Mouth/Throat:      Mouth: Mucous membranes are moist.      Pharynx: No pharyngeal swelling or oropharyngeal exudate.   Eyes:      Extraocular Movements: Extraocular movements intact.      Pupils: Pupils are equal, round, and reactive to light.   Neck:      Thyroid: No thyromegaly.   Cardiovascular:      Rate and Rhythm: Normal rate and regular rhythm.      Heart sounds: No murmur heard.     No friction rub. No gallop.      Comments: 2+ radial and PT pulses bilaterally  Pulmonary:      Effort: Pulmonary effort is normal. No tachypnea, accessory muscle usage or respiratory distress. She is not intubated.      Breath sounds: Normal breath sounds. No decreased breath sounds, wheezing, rhonchi or rales.   Chest:      Chest wall: No tenderness or edema.   Abdominal:      General: Bowel sounds are normal.      Palpations: Abdomen is soft. There is no mass.      Tenderness: There is no abdominal tenderness. There is no guarding.   Musculoskeletal:         General: Normal range of motion.      Cervical back: Normal range of motion.      Right lower leg: No tenderness.      Left lower leg: No tenderness.   Lymphadenopathy:      Cervical: No cervical adenopathy.   Skin:      General: Skin is warm and dry.      Capillary Refill: Capillary refill takes less than 2 seconds.      Findings: No erythema.      Nails: There is no clubbing.   Neurological:      General: No focal deficit present.      Mental Status: She is alert and oriented to person, place, and time.      Cranial Nerves: No cranial nerve deficit.   Psychiatric:         Mood and Affect: Mood normal. Mood is not anxious.         Behavior: Behavior normal. Behavior is not agitated.         ED Course & MDM   Diagnoses as of 04/18/24 1604   Pleuritic chest pain   Costochondritis, acute       Medical Decision Making  38-year-old female past medical history of recent URI who presents today for pleuritic chest pain for the last 2 weeks.  Patient symptoms are likely musculoskeletal in nature versus costochondritis given the fact that she a preceding illness and has had pain since then.  My suspicion for her having a DVT or PE would be a low, however, given the fact that she was sent here for PE rule out, we will get a D-dimer as well as basic labs to confirm she has no other recent or chest pain or fatigue.  Addition, we will get a chest x-ray.  Patient's chest x-ray is normal, her labs are all within normal limits with a negative D-dimer.  Given this, we do not feel that she requires a PET scan at this time.  Patient did also symptomatically improved with Toradol and methocarbamol.  Given this, we will discharge her home with naproxen and methocarbamol.  All questions were answered prior to discharge, return precaution provided        Procedure  Procedures     Percy Woodard MD  Resident  04/18/24 9207

## 2024-04-16 NOTE — ED TRIAGE NOTES
Pt. Presents to the ED from urgent care. States she was sent here to rule out any blood clots. Pt. Complain of back pain that started 4/1. That has been more severe. States she has been having SOB and hard time breathing.  States she recently traveled to [place] and has been sick since. Denies CP. EKG completed in triage.

## 2024-04-17 ENCOUNTER — ANCILLARY PROCEDURE (OUTPATIENT)
Dept: ENDOCRINOLOGY | Facility: CLINIC | Age: 39
End: 2024-04-17
Payer: COMMERCIAL

## 2024-04-17 DIAGNOSIS — Z31.41 FERTILITY TESTING: ICD-10-CM

## 2024-04-17 PROCEDURE — 76830 TRANSVAGINAL US NON-OB: CPT | Performed by: OBSTETRICS & GYNECOLOGY

## 2024-04-17 PROCEDURE — 76830 TRANSVAGINAL US NON-OB: CPT

## 2024-05-06 ENCOUNTER — OFFICE VISIT (OUTPATIENT)
Dept: PRIMARY CARE | Facility: CLINIC | Age: 39
End: 2024-05-06
Payer: COMMERCIAL

## 2024-05-06 VITALS
HEART RATE: 90 BPM | OXYGEN SATURATION: 98 % | BODY MASS INDEX: 22.66 KG/M2 | DIASTOLIC BLOOD PRESSURE: 66 MMHG | WEIGHT: 136 LBS | HEIGHT: 65 IN | SYSTOLIC BLOOD PRESSURE: 120 MMHG

## 2024-05-06 DIAGNOSIS — Z00.00 ROUTINE GENERAL MEDICAL EXAMINATION AT A HEALTH CARE FACILITY: Primary | ICD-10-CM

## 2024-05-06 PROCEDURE — 1036F TOBACCO NON-USER: CPT | Performed by: PHYSICIAN ASSISTANT

## 2024-05-06 PROCEDURE — 99395 PREV VISIT EST AGE 18-39: CPT | Performed by: PHYSICIAN ASSISTANT

## 2024-05-06 ASSESSMENT — PAIN SCALES - GENERAL: PAINLEVEL: 6

## 2024-05-06 ASSESSMENT — PATIENT HEALTH QUESTIONNAIRE - PHQ9
1. LITTLE INTEREST OR PLEASURE IN DOING THINGS: NOT AT ALL
SUM OF ALL RESPONSES TO PHQ9 QUESTIONS 1 AND 2: 0
2. FEELING DOWN, DEPRESSED OR HOPELESS: NOT AT ALL

## 2024-05-06 NOTE — PROGRESS NOTES
"Subjective   Patient ID: Taty Sosa is a 38 y.o. female who presents for Annual Exam (Patient states mid back pain x6 weeks. Was taking muscle relaxer and naproxen, not affective//bp ).    Presents for RPE.   Has had back/chest pain after severe URI -   Was on a  cruise prior to illness.   Labs and imaging reviewed          Review of Systems   All other systems reviewed and are negative.      Objective   /66 (BP Location: Left arm, Patient Position: Sitting)   Pulse 90   Ht 1.651 m (5' 5\")   Wt 61.7 kg (136 lb)   LMP 04/07/2024 (Exact Date)   SpO2 98%   BMI 22.63 kg/m²     Physical Exam  Constitutional:       Appearance: Normal appearance.   Cardiovascular:      Rate and Rhythm: Normal rate and regular rhythm.      Heart sounds: Normal heart sounds.   Pulmonary:      Breath sounds: Normal breath sounds.   Neurological:      Mental Status: She is alert.         Assessment/Plan   Diagnoses and all orders for this visit:  Routine general medical examination at a health care facility         "

## 2024-05-16 ENCOUNTER — OFFICE VISIT (OUTPATIENT)
Dept: OBSTETRICS AND GYNECOLOGY | Facility: CLINIC | Age: 39
End: 2024-05-16
Payer: COMMERCIAL

## 2024-05-16 VITALS
SYSTOLIC BLOOD PRESSURE: 108 MMHG | BODY MASS INDEX: 23.46 KG/M2 | HEIGHT: 65 IN | WEIGHT: 140.8 LBS | DIASTOLIC BLOOD PRESSURE: 70 MMHG

## 2024-05-16 DIAGNOSIS — Z72.51 UNPROTECTED SEX: ICD-10-CM

## 2024-05-16 DIAGNOSIS — Z11.51 ENCOUNTER FOR SCREENING FOR HUMAN PAPILLOMAVIRUS (HPV): ICD-10-CM

## 2024-05-16 DIAGNOSIS — Z31.9 DESIRE FOR PREGNANCY: ICD-10-CM

## 2024-05-16 DIAGNOSIS — Z12.4 CERVICAL CANCER SCREENING: ICD-10-CM

## 2024-05-16 DIAGNOSIS — Z01.419 WELL WOMAN EXAM: Primary | ICD-10-CM

## 2024-05-16 PROCEDURE — 99395 PREV VISIT EST AGE 18-39: CPT | Performed by: OBSTETRICS & GYNECOLOGY

## 2024-05-16 PROCEDURE — 87661 TRICHOMONAS VAGINALIS AMPLIF: CPT | Performed by: OBSTETRICS & GYNECOLOGY

## 2024-05-16 PROCEDURE — 88175 CYTOPATH C/V AUTO FLUID REDO: CPT | Mod: TC,WESLAB | Performed by: OBSTETRICS & GYNECOLOGY

## 2024-05-16 PROCEDURE — 87624 HPV HI-RISK TYP POOLED RSLT: CPT | Performed by: OBSTETRICS & GYNECOLOGY

## 2024-05-16 PROCEDURE — 87491 CHLMYD TRACH DNA AMP PROBE: CPT | Performed by: OBSTETRICS & GYNECOLOGY

## 2024-05-16 RX ORDER — LORATADINE 10 MG/1
10 TABLET ORAL DAILY
COMMUNITY

## 2024-05-16 RX ORDER — DIPHENHYDRAMINE HCL 25 MG
25 TABLET ORAL NIGHTLY PRN
COMMUNITY

## 2024-05-16 ASSESSMENT — LIFESTYLE VARIABLES
AUDIT-C TOTAL SCORE: 3
HOW OFTEN DO YOU HAVE SIX OR MORE DRINKS ON ONE OCCASION: NEVER
HOW OFTEN DO YOU HAVE A DRINK CONTAINING ALCOHOL: 2-3 TIMES A WEEK
HOW MANY STANDARD DRINKS CONTAINING ALCOHOL DO YOU HAVE ON A TYPICAL DAY: 1 OR 2
SKIP TO QUESTIONS 9-10: 1

## 2024-05-16 ASSESSMENT — ENCOUNTER SYMPTOMS
DEPRESSION: 0
OCCASIONAL FEELINGS OF UNSTEADINESS: 0
LOSS OF SENSATION IN FEET: 0

## 2024-05-16 ASSESSMENT — PATIENT HEALTH QUESTIONNAIRE - PHQ9
2. FEELING DOWN, DEPRESSED OR HOPELESS: NOT AT ALL
SUM OF ALL RESPONSES TO PHQ9 QUESTIONS 1 & 2: 0
1. LITTLE INTEREST OR PLEASURE IN DOING THINGS: NOT AT ALL

## 2024-05-16 ASSESSMENT — PAIN SCALES - GENERAL: PAINLEVEL: 0-NO PAIN

## 2024-05-16 NOTE — PROGRESS NOTES
ESTABLISHED ANNUAL GYN VISIT     Patient Name:  Taty Sosa  :  1985  MR #:  [id number]  Acct #:  [id number]      ASSESSMENT/PLAN:     1. Well woman exam      2. Cervical cancer screening    - THINPREP PAP TEST  - HPV DNA High Risk With Genotype  - C. Trachomatis / N. Gonorrhoeae, Amplified Detection  - Trichomonas vaginalis, Nucleic Acid Detection    3. Encounter for screening for human papillomavirus (HPV)    - THINPREP PAP TEST  - HPV DNA High Risk With Genotype  - C. Trachomatis / N. Gonorrhoeae, Amplified Detection  - Trichomonas vaginalis, Nucleic Acid Detection    4. Desire for pregnancy    - Referral to [facility]; Future    5. Unprotected sex    - THINPREP PAP TEST  - HPV DNA High Risk With Genotype  - C. Trachomatis / N. Gonorrhoeae, Amplified Detection  - Trichomonas vaginalis, Nucleic Acid Detection       .All questions answered.  Breast self exam technique reviewed and patient encouraged to perform self-exam monthly.  Discussed healthy lifestyle modifications.  Follow up in 1 year.  Thin prep Pap smear.    Counseling:  Medication education:  Education:  All new and/or current medications discussed and reviewed including side effects with patient/caregiver, Understanding:  Caregiver/Patient expressed understanding., Adherence:  Barriers to adherence identified and discussed if present,     OB/GYN Preventive:  Pap smear indicated every 5 years if normal and otherwise low risk. Self breast exam monthly and clinical breast examination yearly discussed.  Diet/Weight management discussed.  Screening colonoscopy recommended starting age 45, then Q3-10 years depending on testing and family history.  Osteoporosis prevention discussion included vit d3/calcium supplements, weight-bearing exercise.  Genitourinary skin hygiene discussed.      Chief Complaint:  Annual exam    HPI:  Taty Sosa is a 38 y.o.  Patient's last menstrual period was 2024 (exact date).  female  who presents for annual. She desires pregnancy.      GYNH:   MENARCHE: 14  HPV Vaccine Yes - .  Sexual activity Yes - . [place] yes    Past Medical History:   Diagnosis Date    Anxiety     Asthma (HHS-HCC)     Depression     GERD (gastroesophageal reflux disease)     Migraine     CHUCK on CPAP     Palpitations     PTSD (post-traumatic stress disorder)     RLS (restless legs syndrome)     Tachycardia     Vitamin D deficiency        Past Surgical History:   Procedure Laterality Date    LAPAROSCOPIC NISSEN FUNDOPLICATION N/A 2017    MOUTH SURGERY      OTHER SURGICAL HISTORY  2016    EP Study       Social History     Tobacco Use    Smoking status: Never     Passive exposure: Never    Smokeless tobacco: Never   Vaping Use    Vaping status: Never Used   Substance Use Topics    Alcohol use: Yes     Comment: Once monthly    Drug use: Never        Family History   Problem Relation Name Age of Onset    Mental illness Mother 65     Hypertension Mother 65     Depression Mother 65     Anxiety disorder Mother 65     Other (prediabetes) Mother 65     Hypertension Father 62     Depression Father 62     Anxiety disorder Father 62     Stroke Father 62 60    Mental illness Father 62     Hypertension Other      Heart disease Other         OB History          0    Para   0    Term   0       0    AB   0    Living   0         SAB   0    IAB   0    Ectopic   0    Multiple   0    Live Births   0                  Prior to Admission medications    Medication Sig Start Date End Date Taking? Authorizing Provider   ALPRAZolam (Xanax) 0.25 mg tablet Take 1 tablet PO twice a day as needed for anxiety or for sleep. 23  Yes Elvira Morgan, DO   aspirin-acetaminophen-caffeine (Excedrin Extra Strength) 250-250-65 mg tablet Take 2 tablets by mouth once daily.   Yes Historical Provider, MD   azelastine (Astelin) 137 mcg (0.1 %) nasal spray Administer 1 spray into each nostril 2 times a day. Use in each nostril as  "directed 2/8/24  Yes Ricky Zuñiga MD   diphenhydrAMINE (Sominex) 25 mg tablet Take 1 tablet (25 mg) by mouth as needed at bedtime for sleep.   Yes Historical Provider, MD   fluticasone (Flonase) 50 mcg/actuation nasal spray Administer 2 sprays into each nostril once daily at bedtime. Shake gently. Before first use, prime pump. After use, clean tip and replace cap. 2/8/24  Yes Ricky Zuñiga MD   loratadine (Claritin) 10 mg tablet Take 1 tablet (10 mg) by mouth once daily.   Yes Historical Provider, MD   PRENATAL 2-IRON-FOLIC ACID-OM3 ORAL Prenatal Adult Gummy/DHA/FA   Yes Historical Provider, MD   letrozole (Femara) 2.5 mg tablet Take with or without food orally on cycle days 3 through 7 monthly. Hold if positive pregnancy test. 10/18/23   Elvira Morgan DO   methocarbamol (Robaxin) 500 mg tablet Take 1 tablet (500 mg) by mouth 3 times a day for 7 days. As needed for pain 4/16/24 4/23/24  Percy Woodard MD   progesterone (Prometrium) 200 mg capsule Take 1 capsule PO every 12 hours on menstrual cycle days 15 through 26. 10/18/23   Elvira Morgan DO   phenylalanine 50 mg powder in packet as directed Orally Prn  5/16/24  Historical Provider, MD       Allergies   Allergen Reactions    Procaine Palpitations    Adhesive Rash    Adhesive Tape-Silicones Rash and Other     Redness - Irritation       ROS:   W[facility] - WOMEN ONLY:          Breast Lump No acute changes.  Hot Flashes Occasional.  Painful [place] no.  Vaginal Discharge no.       [facility] - ROS Update:          Unexplained Weight Change no.  Pain anywhere in your body no.  Black or bloody stools no.  Problems with urination no.  Rashes or sores no.  Sexual problems no.  Depression or anxiety problems no.  Do you feel threatened by anyone No.      OBJECTIVE:   /70   Ht 1.651 m (5' 5\")   Wt 63.9 kg (140 lb 12.8 oz)   LMP 05/05/2024 (Exact Date)   BMI 23.43 kg/m²   Body mass index is 23.43 kg/m².     Physical Exam  GENERAL:   General Appearance:  " well-developed, well-nourished, no functional handicap, well-groomed.  Hygiene:  good.  Ill-appearance:  none.  Mental Status:  alert and oriented.  Speech:  clear.  Eye contact:  normal.  Appears stated age:  yes.    LUNGS:  CTAB.  Effort:  no respiratory distress.    HEART:  HRRR with S1/S2 w/o M/C/R  BREASTS: General:  no masses, no tenderness, no skin changes, no nipple abnormality, and no axillary lymphadenopathy.   ABDOMEN: Tenderness:  none.  Distention:  none.   GENITOURINARY - FEMALE: Bladder:  normal.  Pelvic support defects:  not seen .  External genitalia:  Normal.  Urethra:  Normal.  Vagina:  no lesions, moderate discharge; STI screens collected - Yes - .  Cervix/ cuff:  normal appearance .  Uterus:  normal size/shape/consistency, non tender.  Adnexa:  normal , non tender.   DERMATOLOGY:  Skin:  normal.   EXTREMITIES: Normal:  no anomalies.  Edema:  none.    NEUROLOGICAL: Orientation:  alert and oriented x 3.   PSYCHOLOGY: Affect:  appropriate.  Mood:  pleasant.    Labs reviewed:  Yes -     Imaging reviewed:  Yes -     Note: This dictation was generated using Dragon voice recognition software. Please excuse any grammatical or spelling errors that may have occurred using the system.

## 2024-05-18 LAB
C TRACH RRNA SPEC QL NAA+PROBE: NEGATIVE
N GONORRHOEA DNA SPEC QL PROBE+SIG AMP: NEGATIVE
T VAGINALIS RRNA SPEC QL NAA+PROBE: NEGATIVE

## 2024-05-21 ENCOUNTER — TELEMEDICINE (OUTPATIENT)
Dept: ENDOCRINOLOGY | Facility: CLINIC | Age: 39
End: 2024-05-21
Payer: COMMERCIAL

## 2024-05-21 DIAGNOSIS — N97.9 FEMALE INFERTILITY: Primary | ICD-10-CM

## 2024-05-21 PROCEDURE — 1036F TOBACCO NON-USER: CPT | Performed by: NURSE PRACTITIONER

## 2024-05-21 PROCEDURE — 99214 OFFICE O/P EST MOD 30 MIN: CPT | Performed by: NURSE PRACTITIONER

## 2024-05-21 NOTE — PROGRESS NOTES
Virtual or Telephone Consent: An interactive audio and video telecommunication system which permits real time communications between the patient (at the originating site) and provider (at the distant site) was utilized to provide this telehealth service    Follow Up Visit HPI    Patient is a 38 y.o.  female with  primary infertility and male factor  presenting today for follow up visit. They have been trying to conceive x 10 months.    Testing to date:   Result Date Done   TSH: 0.68 (Ref range: 0.44 - 3.98 mIU/L) 2024   AMH: 1.894 (Ref range: 0.176 - 11.705 ng/mL) 2024   PRL: 12.9 (Ref range: 4.8 - 23.3 NG/ML) 2023   Testosterone: No results found for requested labs within last 365 days. No results found for requested labs within last 365 days.   DHEAS: 166  Reference range: 12  to  379  Unit: ug/dL    MATURITY-BASED REFERENCE RANGES:        PUBERTAL (JANETH) STAGE    MALE      FEMALE       ---------------------------------------------------                 I           5 - 265     5 - 125                  II          15 - 380    15 - 150                 III          60 - 505    20 - 535                            IV          65 - 560    35 - 485                      V         165 - 500    75 - 530       Biotin interference may cause falsely elevated results.   Patients taking a Biotin dose of up to 5 mg/day should   refrain from taking Biotin for 24 hours before sample   collection. Providers may contact their local laboratory  for further information.  Test performed at:  [facility] [id number] [facility] [address]   (Ref range: ) 2023   Other:   Component      Latest Ref Rng 2024   ABO TYPE A    Rh Type POS    ANTIBODY SCREEN NEG    Rubella, IgG      Negative  Positive    Rubella, IgG Index      <=0.7 IA IA 2.1    Varicella Zoster, IgG      Negative  Positive !    Varicella Zoster, IgG Index      <=0.8 IA 2.7 (H)    Anti-Mullerian Hormone      0.176 - 11.705 ng/mL 1.894    Hepatitis B  Surface AG      Nonreactive  Nonreactive    Hepatitis C AB      Nonreactive  Nonreactive    HIV 1/2 Antigen/Antibody Screen with Reflex to Confirmation      Nonreactive  Nonreactive    Syphilis Total Ab      Nonreactive  Nonreactive    Thyroid Stimulating Hormone      0.44 - 3.98 mIU/L 0.68       Legend:  ! Abnormal  (H) High    Hysterosalpingogram: FL HYSTEROSALPINGOGRAM (11/13/2023):   IMPRESSION:  Normal HSG with bilateral fallopian tube patency demonstrated.  Saline Infused Sonography: n/a  GYN Pelvic Ultrasound: US PELVIS TRANSVAGINAL (4/18/2024):   Fertility Testing  Impression  =========     Unremarkable pelvic survey.  Follow-up  ========     Follow-up with ordering Provider. A repeat evaluation is recommended in __ weeks.  Uterus  ======     Uterus:     Visualized  Uterus position:  anteverted  Myometrium: normal  Endometrium:      trilaminar  Cervix details:   normal  Uterus width      33.8 mm  Uterus height     20.6 mm  Endometrial thickness, total  4.5 mm  Right Ovary  =========     Rt ovary:   Visualized  Rt ovary morphology:    normal  Rt ovary D1 24.8 mm  Rt ovary D2 19.1 mm  Rt ovary D3 17.9 mm  Rt ovary Vol      4.4 cmï¿½  Rt ovarian follicle(s): Follicles identified  Rt ovarian follicles other findings:      Antral Follicles 6<10mm  Left Ovary  ========     Lt ovary:   Visualized  Lt ovary morphology:    normal  Lt ovary D1 24.1 mm  Lt ovary D2 18.7 mm  Lt ovary D3 18.5 mm  Lt ovary Vol      4.4 cmï¿½  Lt ovarian follicle(s): Follicles identified  Lt ovarian follicle D1  9.1 mm  Lt ovarian follicle D2  9.9 mm  Lt ovarian follicle mean      9.5 mm  Lt ovarian follicle vol 0.428 cmï¿½  Lt ovarian follicles other findings:      Antral Follicles 7<10mm  Cul de Sac  =========     Visualized. No free fluid visualized  Method    Partner SA:  low motility  Emmanuel Mukherjee     Component      Latest Ref Rng 11/6/2023   [facility] Lab ID# [id number]    Abstinence (days)      2 - 7 days 3    Collected  Completely Yes    Collection Location Center    Collection Method Masturbation    Received time 1:45PM    Analyzed Time 2:03PM    Semen Appearance Normal    Semen Viscosity Abnormal    Semen Liquefaction Normal    Debris (Semen) Yes    Clumps (Semen) No    Agglutination (Semen) No    Volume (Semen)      1.5 mL 2.3    Concentration(Semen)      15 mill/mL 44.94    Total Motility (Semen)      40 % 16 !    Prog. Motility (Semen)      32 % 10 !    Non Prog. Motility (Semen)      % 6    Total No of Sperm (Semen)      39 mill 103.37    Total No of Motile (Semen)      mill 16.02    Total No of Rnd Cells (Semen)      5 mill 0.7    % Normal (Semen)      4 % 3.3 !    % Head defects (Semen)      % 96.0    % Neck Midpiece (Semen)      % 19.3    % Tail defects (Semen)      % 5.8    % Ex Residual Cytoplasm (Semen)      % 0.3    Tot. No of Norm. Motile Sperm (Semen)      mill 3.360    Tot. No of Norm. Sperm (Semen)      mill 0.521       Legend:  ! Abnormal  Treatment to date:       Past Medical History:   Diagnosis Date    Anxiety     Asthma ([facility])     Depression     GERD (gastroesophageal reflux disease)     Migraine     CHUCK on CPAP     Palpitations     PTSD (post-traumatic stress disorder)     RLS (restless legs syndrome)     Tachycardia     Vitamin D deficiency      Past Surgical History:   Procedure Laterality Date    LAPAROSCOPIC NISSEN FUNDOPLICATION N/A 08/07/2017    MOUTH SURGERY      OTHER SURGICAL HISTORY  01/12/2016    EP Study     Current Outpatient Medications on File Prior to Visit   Medication Sig Dispense Refill    ALPRAZolam (Xanax) 0.25 mg tablet Take 1 tablet PO twice a day as needed for anxiety or for sleep. 10 tablet 0    aspirin-acetaminophen-caffeine (Excedrin Extra Strength) 250-250-65 mg tablet Take 2 tablets by mouth once daily.      azelastine (Astelin) 137 mcg (0.1 %) nasal spray Administer 1 spray into each nostril 2 times a day. Use in each nostril as directed 36 mL 2    diphenhydrAMINE (Sominex) 25  mg tablet Take 1 tablet (25 mg) by mouth as needed at bedtime for sleep.      fluticasone (Flonase) 50 mcg/actuation nasal spray Administer 2 sprays into each nostril once daily at bedtime. Shake gently. Before first use, prime pump. After use, clean tip and replace cap. 16 g 2    letrozole (Femara) 2.5 mg tablet Take with or without food orally on cycle days 3 through 7 monthly. Hold if positive pregnancy test. 30 tablet 3    loratadine (Claritin) 10 mg tablet Take 1 tablet (10 mg) by mouth once daily.      methocarbamol (Robaxin) 500 mg tablet Take 1 tablet (500 mg) by mouth 3 times a day for 7 days. As needed for pain 21 tablet 0    PRENATAL 2-IRON-FOLIC ACID-OM3 ORAL Prenatal Adult Gummy/DHA/FA      progesterone (Prometrium) 200 mg capsule Take 1 capsule PO every 12 hours on menstrual cycle days 15 through 26. 36 capsule 3    [DISCONTINUED] phenylalanine 50 mg powder in packet as directed Orally Prn       No current facility-administered medications on file prior to visit.       BMI:   BMI Readings from Last 1 Encounters:   24 23.43 kg/m²     VITALS:  LMP 2024 (Exact Date)   LMP: Patient's last menstrual period was 2024 (exact date).    ASSESSMENT   38 y.o.  female with primary infertility x 10 months, Male infertility and the following pertinent medical issues: sleep apnea, anxiety .    COUNSELING  We discussed that Clomid is used for ovulation induction. We discussed common side effects of Clomid including headaches, vision changes, hot flashes, mood changes, and breast tenderness.  We discussed that there is an increased risk of multiple gestation with Clomid approximately 10% for twins and less than 1% for triplets.  Counseled regarding option of selective reduction for higher order multiples.    Clomid (clomiphene citrate) :  2 tablet (s) a day, cycle days 3-7   Note: 1st day of full flow is cycle day 1    Have sexual intercourse approximately every other day for 1 week beginning  "cycle day 11  You don't need temperature charts or LH predictor kits because normal cycle lengths indicate ovulatory cycles.  If you are planning inseminations, you will use LH predictor kits for timing      If normal 28 - 32 day cycle, repeat above for a total of 3 cycles    Call office if:   pregnant  cycles longer than 35 days   not pregnant after 3 regular cycles     Side effects of clomid may include:  abdominal discomfort and/or ovarian cysts  headache   hot flushes  mood changes  nausea  visual changes    If side effects are severe, alternative medications are available. Clomid has a 10% chance of multiple pregnancy (twins or more).    USING OVULATION PREDICTOR KITS   Please call the office during the first few days of your menstrual period (monday-friday) to let us know you will be testing and doing an insemination (IUI) this cycle.   - Lili [phone]  - Connie [phone]    Ovulation predictor kits test for LH hormone in your urine. There are a variety of kits available - Clear Blue Easy Digital (NOT the Advanced version) has worked well for many of our patients in the past.     Please disregard the instructions on the test kit. Urinate upon rising in the morning but do not use the first morning urine for the test kit. We want you to use the second morning urine to test for your LH surge. PLEASE TEST ONLY ONCE A DAY.       MONITORED CYCLE  You will take your oral fertility medications as prescribed cycle days 3-7. When you start the medications, you can also call the office to get set up for a \"follicle scan.\" Follicle scans are usually done between cycle days 10-12. It is a vaginal ultrasound. We typically do not perform follicle scans on the weekends, so the schedulers will help you find an appropriate appointment time. The follicle scans are done in the morning and will give us information on how your eggs are growing and how thick the lining of the uterus is. Once the follicles (eggs) are big " enough (ripe or mature) we will trigger their release with a one time subcutaneous injection- the nurses will order this for you and show you how to do it when you are here for the follicle scan. We cannot trigger small eggs, only ones that have grown to mature size. The IUI is usually done about 36 hours after the trigger. The nurse will also help you schedule that.    Routine Testing  Fertility Center  STDs Within 1 year   Genetic carrier Waiver/Completed   T&S Within 1 year   AMH Within 1 year   TSH Within 1 year   Rubella/Varicella Within 5 years     BMI Testing  Fertility Center  CBC Within 1 year   CMP Within 1 year   HgbA1c Within 1 year   Mag, Phos, Vit D <18 Within 1 year   MFM > 40  REQ   Wt loss consult > 40 OPT     PLAN  No orders of the defined types were placed in this encounter.      FOLLOW UP   Consults:  n/a  Engaged MD  Take prenatal vitamins, vitamin D 2000 IUs daily  Discussed that treatment cannot proceed until checklist items are complete.   Additional testing for BMI < 18 or > 40: No.  Chart to primary nurse for care coordination and patient check list/education.- Will send to Palomo  Patient to call cd 1.    MD Completion:  Ectopic Risk: No  Medically Complex: Yes sleep apnea  Outstanding boarding pass items: will confirm nothing with Palomo.    Fertility Plan Update: clomid/IUI x 3. Luteal phase prometrium 100mg PV BID.  Encouraged monitoring and trigger if she is available.     Moriah Mensah  05/21/2024  3:38 PM

## 2024-05-30 ENCOUNTER — TELEPHONE (OUTPATIENT)
Dept: ENDOCRINOLOGY | Facility: CLINIC | Age: 39
End: 2024-05-30
Payer: COMMERCIAL

## 2024-05-30 DIAGNOSIS — Z31.89 ENCOUNTER FOR ARTIFICIAL INSEMINATION: ICD-10-CM

## 2024-05-30 DIAGNOSIS — N97.9 FEMALE INFERTILITY: ICD-10-CM

## 2024-05-30 LAB

## 2024-05-30 NOTE — TELEPHONE ENCOUNTER
LMP 5.29.24. Outstanding Checklist items:  Cam needs to do genetics authorization form    Pt is undecided if she wants to do IUI vs TIC. She has a difficult work schedule. We discussed monitoring appts 0640 start, but may not always be available. She works at [employer]. There may be difficulty for her to come in for monitoring appts but also for IUI. I offered to have her do Clomid/TIC as she is in orientation and has very little flexibility this cycle. We discussed that TIC would entail IC every other day CD 10-20. Her OPKs are not reliable, so monitoring and trigger are preferred.  She is agreeable to discussing possible monitor and trigger with partner tonight. She understands that if she decides to try to monitor and trigger she will need to monitor 6/10 or 6/11. She cannot monitor 6/7 due to her schedule.   will call pt in morning to schedule her for 6/10 or 6/11. [employer] message sent to patient with additional info.  Pt verbalized understanding and is agreeable.    Palomo Hill 05/30/24 4:25 PM     Pt's partner completed genetics authorization. However, she told me he would decline testing and his form consents to testing. I called pt and she states he called her and told her  he clicked the wrong button and meant to decline. He was unable to change his answer to decline. I told pt I would reassign form now, but that she would need to complete it again too. Pt verbalized understanding and is agreeable.  Palomo Hill 05/30/24 4:56 PM

## 2024-05-30 NOTE — PROGRESS NOTES
Boarding Pass Oral TIC/IUI    Age: 38 y.o.    Provider: Moriah Mensah CNP  Primary RN: Palomo Hill  Reasons for Treatment: Male infertility  Last BMI  24 : 23.43 kg/m²       Past Medical History:   Diagnosis Date    Anxiety     Asthma (HHS-HCC)     Depression     GERD (gastroesophageal reflux disease)     Migraine     CHUKC on CPAP     Palpitations     PTSD (post-traumatic stress disorder)     RLS (restless legs syndrome)     Tachycardia     Vitamin D deficiency        Date Done Consultation Results/Comments   24 Medication Protocol    Clomid 100 mg days 3-7/monitoring/HCG Trigger/IUI + Luteal phase prometrium 100 mg PV BID x 3 cycles.  Encouraged monitoring and trigger if she is available.    24 Procedure Order Placed [x]     Date Done Female Labs Results/Comments   2024 T&S (Q 1 Year) ABO: A  Rh: POS  Antibody: NEG     2024 Hep B sAg Nonreactive   2024 Hep C AB Nonreactive   2024 HIV Nonreactive   2024 Syphilis Nonreactive   2024 GC/CT GC: Negative  CT: Negative   2024 Rubella (Q 5 Years) Positive   2024 Varicella (Q 5 Years) Positive   .   Carrier Screening Myriad 2bP: universal panel  Authorization completed  Neg   2023 HSG normal   2024 Pelvic ultrasound Unremarkable pelvic survey.    Date Done Male Labs (required if IUI)   Results/Comments  Emmanuel juarez  [birth year]   2024 Hep B sAg Nonreactive   2024 Hep C AB  Nonreactive   2024 HIV Nonreactive   2024 Syphilis Nonreactive   2024 GC/CT GC: Negative  CT: Negative   24 Carrier Screening   Declined and Authorization completed  N/A   2023 Semen Analysis  Volume(mL): 2.3  Count(million): 44.94  Motility(%): 16  Motile Count(million): 3.360   Date Done Miscellaneous Results/Comments    BMI Checklist  BMI > 40 or < 18 Added to chart:   No    >= 45 Checklist  Added to chart:   No     MD Completion:  Ectopic Risk: No  Medically Complex: Yes- sleep apnea    Boarding Pass  reviewed with HERNESTO Hill RN and is complete. Patient & partner are cleared for fertility treatment:  Clomid 100 mg days 3-7/monitoring/HCG Trigger/IUI + Luteal phase prometrium 100 mg PV BID x 3 cycles. Encouraged monitoring and trigger if she is available. Loida Branham CNP 05/30/24 7:12 PM

## 2024-05-31 ENCOUNTER — SPECIALTY PHARMACY (OUTPATIENT)
Dept: PHARMACY | Facility: CLINIC | Age: 39
End: 2024-05-31

## 2024-05-31 DIAGNOSIS — N97.9 FEMALE INFERTILITY: ICD-10-CM

## 2024-05-31 RX ORDER — PROGESTERONE 100 MG/1
100 CAPSULE ORAL 2 TIMES DAILY
Qty: 60 CAPSULE | Refills: 0 | Status: SHIPPED | OUTPATIENT
Start: 2024-05-31 | End: 2025-05-31

## 2024-05-31 RX ORDER — CLOMIPHENE CITRATE 50 MG/1
100 TABLET ORAL DAILY
Qty: 10 TABLET | Refills: 0 | Status: SHIPPED | OUTPATIENT
Start: 2024-05-31 | End: 2024-06-05

## 2024-05-31 RX ORDER — CHORIONIC GONADOTROPIN 10000 UNIT
10000 KIT INTRAMUSCULAR ONCE AS NEEDED
Qty: 1 EACH | Refills: 0 | Status: SHIPPED | OUTPATIENT
Start: 2024-05-31

## 2024-05-31 NOTE — PROGRESS NOTES
Pended medication orders for IUI #1 to DIANDRA Branham CNP.   BRITTANY CERVANTES, MIRANDA 05/31/2024 07:00

## 2024-06-11 ENCOUNTER — ANCILLARY PROCEDURE (OUTPATIENT)
Dept: ENDOCRINOLOGY | Facility: CLINIC | Age: 39
End: 2024-06-11
Payer: COMMERCIAL

## 2024-06-11 DIAGNOSIS — N97.9 FEMALE INFERTILITY: ICD-10-CM

## 2024-06-11 LAB
ESTRADIOL SERPL-MCNC: 201 PG/ML
LH SERPL-ACNC: 22.8 IU/L
PROGEST SERPL-MCNC: 5.2 NG/ML

## 2024-06-11 PROCEDURE — 36415 COLL VENOUS BLD VENIPUNCTURE: CPT

## 2024-06-11 PROCEDURE — 83002 ASSAY OF GONADOTROPIN (LH): CPT

## 2024-06-11 PROCEDURE — 76857 US EXAM PELVIC LIMITED: CPT

## 2024-06-11 NOTE — PROGRESS NOTES
"  CYCLING NOTE    Here for US and/or lab monitoring; relevant findings reviewed. Pt states she thinks she ovulated about 1-2 days ago. She is concerned that \"our window\" of monitoring does not line up with her short cycles. I explained to pt that we can bring her in for monitoring as early as needed based on her cycle length. She states she has 21 days cycles.   This first clomid cycle LMP 5/29. Prog added susanne Mensah CNP.     Pt voiced concern about her work schedule and ability to do monitoring and IUI. I explained to pt how we have to line up monitoring, then triggering once there a mature follicle, and IUI approx 36 hours later. I asked her if she could possibly come over for an IUI during a work break (works at [employer].) She said if it is later in the morning she may be able to. I explained we will do the best we can to accommodate her work schedule so that she can have IUI.  Team will contact patient later today with results and plan. She understands if she did ovulate this cycle already she is to test for pregnancy if no period by 6/24.  Pt verbalized understanding and is agreeable.  Palomoaida Hill 06/11/24 9:16 AM    [facility] PROVIDER NOTE  Ultrasound and/or labs reviewed at [facility]   Results for orders placed or performed in visit on 06/11/24 (from the past 96 hour(s))   Estradiol   Result Value Ref Range    Estradiol 201 pg/mL   Luteinizing Hormone (LH)   Result Value Ref Range    Luteinizing Hormone 22.8 IU/L   Progesterone   Result Value Ref Range    Progesterone 5.2 ng/mL       Patient has already ovulated. Plan to monitor earlier for IUI next cycle.   Moriah Mensah  06/11/2024  11:55 AM    [employer] message sent to patient:  Marcelino Posey,  You did ovulate, and likely it was a couple days ago.     The good news is that the Clomid lengthened your cycle a bit.     The plan is for you to test for pregnancy if no period by June 24th. If you get a + home pregnancy test, please call the office.     If you get a " negative test, please call the office when your period starts and we will get you set up for your next cycle.     I also want ou to track the length of the cycle you are in. May 29th - first day of full flow.     I will put a note in your chart that you need to monitor earlier than usual, around cycle day 9-10. Since we do not do monitoring on weekends, you may have to come in around cycle day #8 if cycle days 9 and 10 fall on a Saturday/Sunday/holiday. Earlier is better than later.     Hoping the timing and your work scheduled allow for an IUI (if needed) next cycle!     Palomo Barajas RN Karan Kyle 06/11/24 12:03 PM

## 2024-06-13 ENCOUNTER — APPOINTMENT (OUTPATIENT)
Dept: ENDOCRINOLOGY | Facility: CLINIC | Age: 39
End: 2024-06-13
Payer: COMMERCIAL

## 2024-06-27 ENCOUNTER — TELEPHONE (OUTPATIENT)
Dept: ENDOCRINOLOGY | Facility: CLINIC | Age: 39
End: 2024-06-27
Payer: COMMERCIAL

## 2024-06-27 DIAGNOSIS — N97.9 FEMALE INFERTILITY: ICD-10-CM

## 2024-06-27 RX ORDER — CLOMIPHENE CITRATE 50 MG/1
100 TABLET ORAL DAILY
Qty: 10 TABLET | Refills: 0 | Status: SHIPPED | OUTPATIENT
Start: 2024-06-27 | End: 2024-07-02

## 2024-06-27 RX ORDER — PROGESTERONE 100 MG/1
100 CAPSULE ORAL 2 TIMES DAILY
Qty: 60 CAPSULE | Refills: 0 | Status: SHIPPED | OUTPATIENT
Start: 2024-06-27 | End: 2025-06-27

## 2024-06-27 RX ORDER — CHORIONIC GONADOTROPIN 10000 UNIT
10000 KIT INTRAMUSCULAR ONCE AS NEEDED
Qty: 1 EACH | Refills: 0 | Status: SHIPPED | OUTPATIENT
Start: 2024-06-27

## 2024-06-27 NOTE — TELEPHONE ENCOUNTER
Telephone call made to patient, voicemail box received. See MyChart message from this RN from today with plan. Office number and hours provided to patient via voicemail.    06/27/24 at 4:10 PM - Nabila Powell RN

## 2024-06-28 PROCEDURE — RXMED WILLOW AMBULATORY MEDICATION CHARGE

## 2024-07-05 ENCOUNTER — ANCILLARY PROCEDURE (OUTPATIENT)
Dept: ENDOCRINOLOGY | Facility: CLINIC | Age: 39
End: 2024-07-05
Payer: COMMERCIAL

## 2024-07-05 ENCOUNTER — LAB (OUTPATIENT)
Dept: LAB | Facility: LAB | Age: 39
End: 2024-07-05
Payer: COMMERCIAL

## 2024-07-05 ENCOUNTER — PHARMACY VISIT (OUTPATIENT)
Dept: PHARMACY | Facility: CLINIC | Age: 39
End: 2024-07-05
Payer: COMMERCIAL

## 2024-07-05 ENCOUNTER — SPECIALTY PHARMACY (OUTPATIENT)
Dept: PHARMACY | Facility: CLINIC | Age: 39
End: 2024-07-05

## 2024-07-05 DIAGNOSIS — N97.9 FEMALE INFERTILITY: ICD-10-CM

## 2024-07-05 LAB
ESTRADIOL SERPL-MCNC: 869 PG/ML
LH SERPL-ACNC: 7.8 IU/L
PROGEST SERPL-MCNC: 0.3 NG/ML

## 2024-07-05 PROCEDURE — 83002 ASSAY OF GONADOTROPIN (LH): CPT

## 2024-07-05 PROCEDURE — 84144 ASSAY OF PROGESTERONE: CPT

## 2024-07-05 PROCEDURE — 36415 COLL VENOUS BLD VENIPUNCTURE: CPT

## 2024-07-05 PROCEDURE — 82670 ASSAY OF TOTAL ESTRADIOL: CPT

## 2024-07-05 PROCEDURE — 76857 US EXAM PELVIC LIMITED: CPT

## 2024-07-05 NOTE — PROGRESS NOTES
CYCLING NOTE    Here for US and/or lab monitoring; relevant findings reviewed. Patient of DIANDRA Branham CNP is here to monitor for IUI #2. Today is cycle day 9. Reviewed trigger shot with patient -- patient will need to  from [facility] since she is currently living in hotel while in the processes of moving. Understands she will still need to call prior to .     Protocol: Clomid 100 mg days 3-7/monitoring/HCG Trigger/IUI + Luteal phase prometrium 100 mg PV BID    Patient stayed for nurse visit. Pain is 0/10  Team will contact patient later today with results and plan.    BRITTANY CERVANTES  07/05/2024  8:48 AM    [employer] message sent to patient to relay plan as discussed with DIANDRA Branham CNP. Patient will trigger tonight between 8-10pm for IUI on Sunday.   BRITTANY CERVANTES RN 07/05/2024 13:32    Monitoring patient: patient is CD 9 today on Clomid/IUI cycle: dominant follicle on the right at 20 mm, E2- 869, LH- 7.8, P4- 0.3. Patient to trigger with HCG 10,000 units subcutaneous tonight for an IUI on Sunday the 7th. Plan to be communicated by RN. Plan agreed upon by Dr. Mendoza. Loida Branham CNP 07/05/24 2:10 PM

## 2024-07-07 ENCOUNTER — PROCEDURE VISIT (OUTPATIENT)
Dept: ENDOCRINOLOGY | Facility: CLINIC | Age: 39
End: 2024-07-07
Payer: COMMERCIAL

## 2024-07-07 DIAGNOSIS — Z31.89 ENCOUNTER FOR ARTIFICIAL INSEMINATION: ICD-10-CM

## 2024-07-07 PROCEDURE — 89261 SPERM ISOLATION COMPLEX: CPT | Performed by: OBSTETRICS & GYNECOLOGY

## 2024-07-07 PROCEDURE — 58322 ARTIFICIAL INSEMINATION: CPT | Mod: GC | Performed by: OBSTETRICS & GYNECOLOGY

## 2024-07-07 NOTE — PROGRESS NOTES
"Patient ID: Taty Sosa is a 38 y.o. female.    Intrauterine Insemination (IUI)    Date/Time: 7/7/2024 9:23 AM    Performed by: Irma Sanders MD  Authorized by: CHARLINE Bruce    Consent:     Consent obtained:  Verbal and written    Consent given by:  Patient    Procedure risks and benefits discussed: yes      Patient questions answered: yes      Patient agrees, verbalizes understanding, and wants to proceed: yes      Educational handouts given: yes      Instructions and paperwork completed: yes    Procedure:     Pelvic exam performed: yes      Speculum placed in vagina: yes      Tenaculum applied to cervix: no      Type of insemination: intrauterine insemination      Ultrasound guidance: No      Speculum type: Rambo      Catheter type: curved      Curvature: mild      Difficulty: easy      Estimated Blood Loss:  None    Specimen Return: none    Post-procedure:     Patient tolerated procedure well: yes      Post-procedure plan: patient counseled on signs and symptoms for which to call and/or return to clinic    Comments:     Procedure comments:  IUI Procedure note:    The patient presented today for IUI.     Consent signed by patient, IUI sample identified by patient, and Final Verification performed with patient via electronic system \"\" prior to insemination.   All patient's questions were discussed and answered.          Chaperone offered to patient for intimate exam: Patient declined chaperone  Name of chaperone: N/A    Specimen Source: Partner  Specimen Condition: Fresh  TMS 8.78 M    Additional notes:    Patient was advised to call office if she develops fever, chills, pelvic pain, or heavy bleeding.    Progesterone and post-IUI teaching completed. Will start Progesterone three days after IUI and continue twice daily. Pt will do a home pregnancy test two weeks from IUI date. If home pregnancy test positive, patient will continue Progesterone and call office to schedule BHCG. If home " pregnancy test negative, patient will discontinue Progesterone, and was advised to call office with start of menses; will proceed with another cycle if appropriate.  Patient verbalized understanding of plan.    Attending Attestation: I was physically present for key and critical portions performed by the fellow. I reviewed the fellow's documentation and discussed the patient with the fellow. I agree with the fellow's medical decision making as documented in the fellow's note.

## 2024-07-09 NOTE — PROGRESS NOTES
Attestation: I reviewed the key and critical portions of the history and physical exam and/or was physically present for key and critical portions performed by the fellow. I reviewed the fellow's documentation and discussed the patient with the fellow. I agree with the fellow's medical decision making as documented in the fellow's note.   Tiffany Morales MD

## 2024-07-22 ENCOUNTER — TELEPHONE (OUTPATIENT)
Dept: ENDOCRINOLOGY | Facility: CLINIC | Age: 39
End: 2024-07-22
Payer: COMMERCIAL

## 2024-07-22 DIAGNOSIS — Z31.89 ENCOUNTER FOR ARTIFICIAL INSEMINATION: ICD-10-CM

## 2024-07-22 DIAGNOSIS — N97.9 FEMALE INFERTILITY: ICD-10-CM

## 2024-07-22 PROCEDURE — RXMED WILLOW AMBULATORY MEDICATION CHARGE

## 2024-07-22 RX ORDER — CLOMIPHENE CITRATE 50 MG/1
100 TABLET ORAL DAILY
Qty: 10 TABLET | Refills: 0 | Status: SHIPPED | OUTPATIENT
Start: 2024-07-22 | End: 2024-07-27

## 2024-07-22 RX ORDER — CHORIONIC GONADOTROPIN 10000 UNIT
10000 KIT INTRAMUSCULAR ONCE AS NEEDED
Qty: 1 EACH | Refills: 0 | Status: SHIPPED | OUTPATIENT
Start: 2024-07-22

## 2024-07-22 NOTE — TELEPHONE ENCOUNTER
Reason for call: reporting start of cycle  LMP:  Treatment type: IVF/IUI/timed intercourse  Note:  7-22-24

## 2024-07-22 NOTE — TELEPHONE ENCOUNTER
Patient called with menses for IUI cycle  Cycle #: Clomid IUI #3   Medication: Clomid 100 mg   Ovulation: Trigger Pregnyl   Sperm Source:  partner fresh  Approved donor sperm number: N/A  Luteal Support: Prometrium 100 mg PV BID   Additional Medications:  N/A    Boarding Pass signed off: Boarding Pass reviewed with HERNESTO Hill RN and is complete. Patient & partner are cleared for fertility treatment:  Clomid 100 mg days 3-7/monitoring/HCG Trigger/IUI + Luteal phase prometrium 100 mg PV BID x 3 cycles. Encouraged monitoring and trigger if she is available. Loida Branham CNP 05/30/24 7:12 PM     IUI order pended: pended to Moriah Mensah CNP 7/22/24  Additional Information: attempted to reach patient with above plan. Detailed VM left for patient. Patient instructed Clomid 100 mg was pended to provider to be sent to local pharmacy. Patient instructed to take Clomid 100 mg CD 3-7 and call [facility] for trigger shot. Patient instructed to make follow up with NP and schedule monitoring for 7/30/24 CD 9. Patient instructed to call or my chart with any questions/concerns.   CHRISTY CARTAGENA on 7/22/24 at 3:03 PM.

## 2024-07-24 ENCOUNTER — TELEPHONE (OUTPATIENT)
Dept: ENDOCRINOLOGY | Facility: CLINIC | Age: 39
End: 2024-07-24
Payer: COMMERCIAL

## 2024-07-24 NOTE — TELEPHONE ENCOUNTER
Returned call to patient. Patient reported that she still has not had her period despite having a negative UPT. Patient is currently CD29. Confirmed patient is not on her Prometrium. Patient had called on 7/22 when she thought she was starting her menses but really just had a small pink spot so menses did not truly start.    Relayed to patient that this is okay and we can give it a few days to see if her period comes naturally -- reviewed that it can take a some days following discontinuing her Prometrium as well for her period to begin as well. This was patients first IUI and also reviewed that a first time medicated cycle can also throw off cycle patterns as well. Patient will see if menses starts before Friday and if not will call us to evaluate further. Also reviewed that with her period not starting when she thought that it did, we would need to rearrange a date for IUI monitoring -- but we will determine that date based off her cycle start.     Patient inquired on cost of IVF for her knowledge -- will send message to financial department.     BRITTANY CERVANTES RN 07/24/2024 09:45

## 2024-07-25 ENCOUNTER — TELEPHONE (OUTPATIENT)
Dept: ENDOCRINOLOGY | Facility: CLINIC | Age: 39
End: 2024-07-25
Payer: COMMERCIAL

## 2024-07-25 NOTE — TELEPHONE ENCOUNTER
Reason for call: reporting start of cycle  LMP: 7/25/24  Treatment type: IVF/IUI/timed intercourse  Note:

## 2024-07-25 NOTE — TELEPHONE ENCOUNTER
Patient called with menses for IUI cycle  Cycle #:  3  Medication: Clomid  Ovulation: Trigger  Sperm Source:  partner fresh  Approved donor sperm number:  Luteal Support: Prometrium  Additional Medications:   Boarding Pass signed off: Yes 5/30/24 for 3 cycles  IUI order pended: Yes  Additional Information:

## 2024-07-31 ENCOUNTER — APPOINTMENT (OUTPATIENT)
Dept: ENDOCRINOLOGY | Facility: CLINIC | Age: 39
End: 2024-07-31
Payer: COMMERCIAL

## 2024-08-02 ENCOUNTER — PHARMACY VISIT (OUTPATIENT)
Dept: PHARMACY | Facility: CLINIC | Age: 39
End: 2024-08-02
Payer: COMMERCIAL

## 2024-08-02 ENCOUNTER — ANCILLARY PROCEDURE (OUTPATIENT)
Dept: ENDOCRINOLOGY | Facility: CLINIC | Age: 39
End: 2024-08-02
Payer: COMMERCIAL

## 2024-08-02 DIAGNOSIS — N97.9 FEMALE INFERTILITY: ICD-10-CM

## 2024-08-02 DIAGNOSIS — Z31.41 FERTILITY TESTING: Primary | ICD-10-CM

## 2024-08-02 LAB
ESTRADIOL SERPL-MCNC: 544 PG/ML
LH SERPL-ACNC: 34.8 IU/L
PROGEST SERPL-MCNC: 1.5 NG/ML

## 2024-08-02 PROCEDURE — 83002 ASSAY OF GONADOTROPIN (LH): CPT

## 2024-08-02 PROCEDURE — 76857 US EXAM PELVIC LIMITED: CPT

## 2024-08-02 PROCEDURE — 36415 COLL VENOUS BLD VENIPUNCTURE: CPT

## 2024-08-02 NOTE — PROGRESS NOTES
CYCLING NOTE    Here for US and/or lab monitoring; relevant findings reviewed.  Patient monitoring for Clomid IUI #3. Today is cycle day 9.  Patient contacted pharmacy for pregnyl trigger.   Patient stayed for nurse visit. Pain is 0/10  Team will contact patient later today with results and plan.    Rebecca Perkins  08/02/2024  9:05 AM      RN reviewed patient's ultrasound and labs with Loida Branham CNP. Patient does not need to trigger, patient is to come into clinic tomorrow for her IUI. Patient verbalized understanding. Patient aware she needs a follow up appointment after IUI #3.   Rebecca Perkins RN 08/02/24 1:50 PM

## 2024-08-03 ENCOUNTER — PROCEDURE VISIT (OUTPATIENT)
Dept: ENDOCRINOLOGY | Facility: CLINIC | Age: 39
End: 2024-08-03
Payer: COMMERCIAL

## 2024-08-03 DIAGNOSIS — Z31.89 ENCOUNTER FOR ARTIFICIAL INSEMINATION: ICD-10-CM

## 2024-08-03 PROCEDURE — 89261 SPERM ISOLATION COMPLEX: CPT | Performed by: OBSTETRICS & GYNECOLOGY

## 2024-08-03 PROCEDURE — 58322 ARTIFICIAL INSEMINATION: CPT | Mod: GC | Performed by: OBSTETRICS & GYNECOLOGY

## 2024-08-03 NOTE — PROGRESS NOTES
"Patient ID: Taty Sosa is a 39 y.o. female.    Intrauterine Insemination (IUI)    Date/Time: 8/3/2024 10:08 AM    Performed by: Trupti Dillon MD  Authorized by: CHARLINE Boothe    Consent:     Consent obtained:  Verbal and written    Consent given by:  Patient    Procedure risks and benefits discussed: yes      Patient questions answered: yes      Patient agrees, verbalizes understanding, and wants to proceed: yes      Educational handouts given: yes      Instructions and paperwork completed: yes    Procedure:     Pelvic exam performed: yes      Speculum placed in vagina: yes      Tenaculum applied to cervix: no      Type of insemination: intrauterine insemination      Ultrasound guidance: No      Speculum type: Rambo      Catheter type: curved      Curvature: mild      Difficulty: easy      Estimated Blood Loss:  None    Specimen Return: none    Post-procedure:     Patient tolerated procedure well: yes      Post-procedure plan: patient counseled on signs and symptoms for which to call and/or return to clinic    Comments:     Procedure comments:  IUI Procedure note:    The patient presented today for IUI.     Consent signed by patient, IUI sample identified by patient, and Final Verification performed with patient via electronic system \"\" prior to insemination.   All patient's questions were discussed and answered.          Chaperone offered to patient for intimate exam: Patient declined chaperone  Name of chaperone: N/A    Specimen Source: Partner  Specimen Condition: Fresh  TMS 8.01 M    Additional notes:    Patient was advised to call office if she develops fever, chills, pelvic pain, or heavy bleeding.    Pt does not want to do progesterone supplementation. Post-IUI teaching completed. Pt will do a home pregnancy test two weeks from IUI date. If home pregnancy test positive, patient will call office to schedule BHCG. If home pregnancy test negative, patient was advised to call " office with start of menses; will proceed with another cycle if appropriate.  Patient verbalized understanding of plan.    Irma Sanders 08/03/24 10:08 AM        Attending Attestation: I was physically present for key and critical portions performed by the fellow. I reviewed the fellow's documentation and discussed the patient with the fellow. I agree with the fellow's medical decision making as documented in the fellow's note.

## 2024-08-19 ENCOUNTER — TELEPHONE (OUTPATIENT)
Dept: ENDOCRINOLOGY | Facility: CLINIC | Age: 39
End: 2024-08-19

## 2024-08-19 DIAGNOSIS — N97.9 FEMALE INFERTILITY: ICD-10-CM

## 2024-08-19 DIAGNOSIS — Z31.89 ENCOUNTER FOR ARTIFICIAL INSEMINATION: ICD-10-CM

## 2024-08-19 RX ORDER — CHORIONIC GONADOTROPIN 10000 UNIT
10000 KIT INTRAMUSCULAR ONCE AS NEEDED
Qty: 1 EACH | Refills: 0 | Status: SHIPPED | OUTPATIENT
Start: 2024-08-19

## 2024-08-19 RX ORDER — CLOMIPHENE CITRATE 50 MG/1
100 TABLET ORAL DAILY
Qty: 10 TABLET | Refills: 0 | Status: SHIPPED | OUTPATIENT
Start: 2024-08-19 | End: 2024-08-24

## 2024-08-19 NOTE — PROGRESS NOTES
Patient called with menses LMP 8/17/24 for IUI cycle  Cycle #:  4 (but 3rd IUI cycle)  Medication: Clomid  Ovulation: Trigger  Sperm Source:  partner fresh  Luteal Support: Prometrium  Additional Medications:  none  Boarding Pass signed off: 5.30.24  IUI order pended: 8/19/24  Additional Information: left vm. Also sent VANESSA Hill 08/19/24 12:29 PM

## 2024-08-20 ENCOUNTER — LAB (OUTPATIENT)
Dept: LAB | Facility: LAB | Age: 39
End: 2024-08-20
Payer: COMMERCIAL

## 2024-08-20 LAB — COTININE UR QL SCN: NEGATIVE

## 2024-08-26 ENCOUNTER — ANCILLARY PROCEDURE (OUTPATIENT)
Dept: ENDOCRINOLOGY | Facility: CLINIC | Age: 39
End: 2024-08-26
Payer: COMMERCIAL

## 2024-08-26 DIAGNOSIS — N97.9 FEMALE INFERTILITY: ICD-10-CM

## 2024-08-26 DIAGNOSIS — N91.2 AMENORRHEA: Primary | ICD-10-CM

## 2024-08-26 LAB
B-HCG SERPL-ACNC: <2 MIU/ML
ESTRADIOL SERPL-MCNC: 542 PG/ML
LH SERPL-ACNC: 3.7 IU/L
PROGEST SERPL-MCNC: 38 NG/ML

## 2024-08-26 PROCEDURE — 84144 ASSAY OF PROGESTERONE: CPT

## 2024-08-26 PROCEDURE — 82670 ASSAY OF TOTAL ESTRADIOL: CPT

## 2024-08-26 PROCEDURE — 76857 US EXAM PELVIC LIMITED: CPT | Performed by: OBSTETRICS & GYNECOLOGY

## 2024-08-26 PROCEDURE — 84702 CHORIONIC GONADOTROPIN TEST: CPT

## 2024-08-26 PROCEDURE — 76857 US EXAM PELVIC LIMITED: CPT

## 2024-08-26 PROCEDURE — 83002 ASSAY OF GONADOTROPIN (LH): CPT

## 2024-08-26 NOTE — PROGRESS NOTES
CYCLING NOTE    Here for US and/or lab monitoring; relevant findings reviewed. She has trigger at home.    Team will contact patient later today with results and plan.    Palomo Hill  08/26/2024  10:00 AM      Late entry for 8/26/24 at 1650: Add on prog=38. Reviewed all labs and ultrasound with RAJAN Mensah CNP. Change in plan: pt will not trigger since prog indicates ovulation. Per RAJAN Anguiano CNP- will review case tomorrow with Dr. Dillon since P4=38 and pt is cycle day #9. Cycle cancelled. [employer] message sent to patient after plan reviewed with provider.  Palomo Hill 08/27/24 8:16 AM     MD note: Reviewed stimulation and labs. Plan to cancel this cycle due to premature ovulation. Will discuss with MD and get back to patient with a plan.  Moriah Mensah 08/27/24 9:21 AM

## 2024-08-27 ENCOUNTER — PATIENT MESSAGE (OUTPATIENT)
Dept: ENDOCRINOLOGY | Facility: CLINIC | Age: 39
End: 2024-08-27
Payer: COMMERCIAL

## 2024-08-27 NOTE — PATIENT COMMUNICATION
Marcelino Posey,  I talked with Dr. Dillon during the team meeting today. We reviewed your previous cycles and all of your labs and ultrasound from yesterday.    She agrees that your progesterone level is higher than we would expect for someone who just ovulated. With a progesterone of 38, she believes you may have ovulated as early as cycle day 4. Thought this is unusual, we have seen it happen. Usually giving someone Clomid will help to lengthen their cycles by slowing follicular development which lengthens your cycle.    In cases like yours however,the next step is to have you do a baseline ultrasound. This means you call cycle day 1 or 2, and we bring you in for a monitoring visit (ultrasound and labs) cycles day 2-4; but you do not take any Clomid until we do that ultrasound and labs.    We want to see what your ovaries are doing before we give you any medication. I know you are traveling soon, but I would expect your period to start next week sometime. Even if you cannot do the IUI because you will be on vacation, I would still really like for you to try to come in for that baseline ultrasound at labs at the beginning of your next cycle. Also, looking at the calendar and knowing you will likely get a period next week, you should be okay to do an IUI before you leave for [place]. Does that sound okay to you?    Once you do the baseline appointment with your next cycle, we will see how big your follicle is at that time. That will help us to determine when your next monitoring visit should be.     Even if you decide not to do IUI next cycle, I encourage you to call when your period starts to get scheduled for baseline ultrasound and labs.    Let us know if you have questions!      [email or web address] message sent to patient after plan reviewed with provider.  Palomo Hill 08/27/24 2:04 PM

## 2024-09-05 ENCOUNTER — TELEPHONE (OUTPATIENT)
Dept: ENDOCRINOLOGY | Facility: CLINIC | Age: 39
End: 2024-09-05
Payer: COMMERCIAL

## 2024-09-05 DIAGNOSIS — N97.9 FEMALE INFERTILITY: ICD-10-CM

## 2024-09-05 RX ORDER — CHORIONIC GONADOTROPIN 10000 UNIT
10000 KIT INTRAMUSCULAR ONCE AS NEEDED
Qty: 1 EACH | Refills: 0 | Status: SHIPPED | OUTPATIENT
Start: 2024-09-05

## 2024-09-05 RX ORDER — CLOMIPHENE CITRATE 50 MG/1
100 TABLET ORAL DAILY
Qty: 10 TABLET | Refills: 0 | Status: SHIPPED | OUTPATIENT
Start: 2024-09-05 | End: 2024-09-10

## 2024-09-05 NOTE — TELEPHONE ENCOUNTER
Patient called with menses for IUI cycle  Cycle #:  5 (#1 and 4 cancelled)  Medication: Clomid  Ovulation: Trigger  Sperm Source:  partner fresh  Approved donor sperm number:  Luteal Support: Prometrium  Additional Medications:   Boarding Pass signed off: Yes, 5/30/24  IUI order pended: Yes  Additional Information: call cycle day 1 or 2, and we bring you in for a monitoring visit (ultrasound and labs) cycles day 2-4; but you do not take any Clomid until we do that ultrasound and labs.   Patient transferred to  to schedule baseline for tomorrow. Clomid and Pregnyl refill pended to pharmacy.  Dian Martinez 09/05/24 10:27 AM

## 2024-09-06 ENCOUNTER — APPOINTMENT (OUTPATIENT)
Dept: ENDOCRINOLOGY | Facility: CLINIC | Age: 39
End: 2024-09-06
Payer: COMMERCIAL

## 2024-09-06 ENCOUNTER — ANCILLARY PROCEDURE (OUTPATIENT)
Dept: ENDOCRINOLOGY | Facility: CLINIC | Age: 39
End: 2024-09-06
Payer: COMMERCIAL

## 2024-09-06 DIAGNOSIS — Z32.00 UNCONFIRMED PREGNANCY: Primary | ICD-10-CM

## 2024-09-06 DIAGNOSIS — Z31.41 FERTILITY TESTING: ICD-10-CM

## 2024-09-06 DIAGNOSIS — N97.9 FEMALE INFERTILITY: ICD-10-CM

## 2024-09-06 LAB
B-HCG SERPL-ACNC: <2 MIU/ML
ESTRADIOL SERPL-MCNC: 186 PG/ML
FSH SERPL-ACNC: 11 IU/L
LH SERPL-ACNC: 10.4 IU/L
PROGEST SERPL-MCNC: 1.5 NG/ML

## 2024-09-06 PROCEDURE — 82670 ASSAY OF TOTAL ESTRADIOL: CPT

## 2024-09-06 PROCEDURE — 83002 ASSAY OF GONADOTROPIN (LH): CPT

## 2024-09-06 PROCEDURE — 76857 US EXAM PELVIC LIMITED: CPT

## 2024-09-06 PROCEDURE — 84144 ASSAY OF PROGESTERONE: CPT

## 2024-09-06 RX ORDER — CLOMIPHENE CITRATE 50 MG/1
100 TABLET ORAL DAILY
Start: 2024-09-06 | End: 2024-09-11

## 2024-09-06 NOTE — PROGRESS NOTES
CYCLING NOTE    Here for US and/or lab monitoring for IUI #5; relevant findings reviewed.  Last cycle cancelled as patient had ovulated prior to CD9. Plan was to bring patient in for baseline early in cycle   Today is CD2.  Protocol: CLOMID 100MG, monitoring, trigger, IUI, LP Prometrium  Patient stayed for nurse visit. Pain is 0/10  Team will contact patient later today with results and plan.    Dian Martinez  09/06/2024  7:31 AM    Reviewed by a provider in noon meeting. My Chart message sent to patient with noted plan.  Dian Martinez 09/06/24 3:58 PM

## 2024-09-12 ENCOUNTER — ANCILLARY PROCEDURE (OUTPATIENT)
Dept: ENDOCRINOLOGY | Facility: CLINIC | Age: 39
End: 2024-09-12
Payer: COMMERCIAL

## 2024-09-12 ENCOUNTER — LAB (OUTPATIENT)
Dept: LAB | Facility: LAB | Age: 39
End: 2024-09-12
Payer: COMMERCIAL

## 2024-09-12 DIAGNOSIS — N97.9 FEMALE INFERTILITY: ICD-10-CM

## 2024-09-12 DIAGNOSIS — N97.9 FEMALE INFERTILITY: Primary | ICD-10-CM

## 2024-09-12 LAB
ESTRADIOL SERPL-MCNC: 905 PG/ML
LH SERPL-ACNC: 11.8 IU/L
PROGEST SERPL-MCNC: 0.4 NG/ML

## 2024-09-12 PROCEDURE — 83002 ASSAY OF GONADOTROPIN (LH): CPT

## 2024-09-12 PROCEDURE — 84144 ASSAY OF PROGESTERONE: CPT

## 2024-09-12 PROCEDURE — 76857 US EXAM PELVIC LIMITED: CPT

## 2024-09-12 PROCEDURE — 82670 ASSAY OF TOTAL ESTRADIOL: CPT

## 2024-09-12 PROCEDURE — 36415 COLL VENOUS BLD VENIPUNCTURE: CPT

## 2024-09-12 NOTE — PROGRESS NOTES
CYCLING NOTE    Here for US and/or lab monitoring; relevant findings reviewed.    Patient did not stay for discussion after monitoring,  Team will contact patient later today with results and plan.    Palomoaida Hill  09/12/2024  1:31 PM    [employer] message sent to patient after plan reviewed with provider.  Palomo Hill 09/12/24 4:43 PM     Called pt to discuss her concerns about possibly ovulating tomorrow. We discussed LH and P4 levels today, that US tomorrow would not yield helpful information and labs tomorrow would likely be affected by trigger tonight. I offered her IUI tomorrow (one day earlier than normal and OK per DIANDRA Branham, KARLEE), but pt states she is comfortable doing IUI Saturday after our discussion. Pt verbalized understanding and is agreeable.  Palomo Hill 09/12/24 5:11 PM

## 2024-09-14 ENCOUNTER — PROCEDURE VISIT (OUTPATIENT)
Dept: ENDOCRINOLOGY | Facility: CLINIC | Age: 39
End: 2024-09-14
Payer: COMMERCIAL

## 2024-09-14 DIAGNOSIS — Z31.89 ENCOUNTER FOR ARTIFICIAL INSEMINATION: ICD-10-CM

## 2024-09-14 PROCEDURE — 58322 ARTIFICIAL INSEMINATION: CPT | Mod: GC | Performed by: OBSTETRICS & GYNECOLOGY

## 2024-09-14 PROCEDURE — 89261 SPERM ISOLATION COMPLEX: CPT | Performed by: OBSTETRICS & GYNECOLOGY

## 2024-10-21 ENCOUNTER — TELEPHONE (OUTPATIENT)
Dept: SLEEP MEDICINE | Facility: CLINIC | Age: 39
End: 2024-10-21
Payer: COMMERCIAL

## 2024-10-26 ENCOUNTER — TELEMEDICINE (OUTPATIENT)
Dept: ENDOCRINOLOGY | Facility: CLINIC | Age: 39
End: 2024-10-26
Payer: COMMERCIAL

## 2024-10-26 DIAGNOSIS — Z01.812 ENCOUNTER FOR PREPROCEDURAL LABORATORY EXAMINATION: ICD-10-CM

## 2024-10-26 DIAGNOSIS — N97.9 FEMALE INFERTILITY: Primary | ICD-10-CM

## 2024-10-26 DIAGNOSIS — Z31.41 FERTILITY TESTING: ICD-10-CM

## 2024-10-26 PROBLEM — J45.990 EXERCISE-INDUCED ASTHMA (HHS-HCC): Status: RESOLVED | Noted: 2023-08-24 | Resolved: 2024-10-26

## 2024-10-26 PROBLEM — I47.11 INAPPROPRIATE SINUS TACHYCARDIA (CMS-HCC): Status: RESOLVED | Noted: 2023-08-24 | Resolved: 2024-10-26

## 2024-10-26 PROBLEM — I47.10 PAROXYSMAL SUPRAVENTRICULAR TACHYCARDIA (CMS-HCC): Status: RESOLVED | Noted: 2023-08-24 | Resolved: 2024-10-26

## 2024-10-26 RX ORDER — NORGESTIMATE AND ETHINYL ESTRADIOL 0.25-0.035
1 KIT ORAL DAILY
Qty: 28 TABLET | Refills: 12 | Status: SHIPPED | OUTPATIENT
Start: 2024-10-26 | End: 2025-10-26

## 2024-10-26 RX ORDER — NORGESTIMATE AND ETHINYL ESTRADIOL 0.25-0.035
1 KIT ORAL DAILY
Qty: 28 TABLET | Refills: 2 | Status: SHIPPED | OUTPATIENT
Start: 2024-10-26 | End: 2024-10-26

## 2024-10-26 NOTE — PROGRESS NOTES
Virtual or Telephone Consent: An interactive audio and video telecommunication system which permits real time communications between the patient (at the originating site) and provider (at the distant site) was utilized to provide this telehealth service    Interval history: 38yo G0. Here to discuss options.  employee. Has completed three IUIs (one cancelled due to premature ovulation). AMH 1.9. TMS = about 8 million. She had a bad experience with the last two individuals who did her IUI.     Past History Reviewed and Affirmed Below:  Follow Up Visit HPI  Patient is a 39 y.o.  female with  primary infertility and male factor  presenting today for follow up visit. They have been trying to conceive x 10 months.    Testing to date:   Result Date Done   TSH: 0.68 (Ref range: 0.44 - 3.98 mIU/L) 2024   AMH: 1.894 (Ref range: 0.176 - 11.705 ng/mL) 2024   PRL: No results found for requested labs within last 365 days. No results found for requested labs within last 365 days.   Testosterone: No results found for requested labs within last 365 days. No results found for requested labs within last 365 days.   DHEAS: No results found for requested labs within last 365 days. No results found for requested labs within last 365 days.   Other:   Component      Latest Ref Rng 2024   ABO TYPE A    Rh Type POS    ANTIBODY SCREEN NEG    Rubella, IgG      Negative  Positive    Rubella, IgG Index      <=0.7 IA IA 2.1    Varicella Zoster, IgG      Negative  Positive !    Varicella Zoster, IgG Index      <=0.8 IA 2.7 (H)    Anti-Mullerian Hormone      0.176 - 11.705 ng/mL 1.894    Hepatitis B Surface AG      Nonreactive  Nonreactive    Hepatitis C AB      Nonreactive  Nonreactive    HIV 1/2 Antigen/Antibody Screen with Reflex to Confirmation      Nonreactive  Nonreactive    Syphilis Total Ab      Nonreactive  Nonreactive    Thyroid Stimulating Hormone      0.44 - 3.98 mIU/L 0.68       Legend:  ! Abnormal  (H)  High    Hysterosalpingogram: FL HYSTEROSALPINGOGRAM (11/13/2023):   IMPRESSION:  Normal HSG with bilateral fallopian tube patency demonstrated.  Saline Infused Sonography: n/a  GYN Pelvic Ultrasound: US PELVIS TRANSVAGINAL (4/18/2024):   Fertility Testing  Impression  =========     Unremarkable pelvic survey.  Follow-up  ========     Follow-up with ordering Provider. A repeat evaluation is recommended in __ weeks.  Uterus  ======     Uterus:     Visualized  Uterus position:  anteverted  Myometrium: normal  Endometrium:      trilaminar  Cervix details:   normal  Uterus width      33.8 mm  Uterus height     20.6 mm  Endometrial thickness, total  4.5 mm  Right Ovary  =========     Rt ovary:   Visualized  Rt ovary morphology:    normal  Rt ovary D1 24.8 mm  Rt ovary D2 19.1 mm  Rt ovary D3 17.9 mm  Rt ovary Vol      4.4 cmï¿½  Rt ovarian follicle(s): Follicles identified  Rt ovarian follicles other findings:      Antral Follicles 6<10mm  Left Ovary  ========     Lt ovary:   Visualized  Lt ovary morphology:    normal  Lt ovary D1 24.1 mm  Lt ovary D2 18.7 mm  Lt ovary D3 18.5 mm  Lt ovary Vol      4.4 cmï¿½  Lt ovarian follicle(s): Follicles identified  Lt ovarian follicle D1  9.1 mm  Lt ovarian follicle D2  9.9 mm  Lt ovarian follicle mean      9.5 mm  Lt ovarian follicle vol 0.428 cmï¿½  Lt ovarian follicles other findings:      Antral Follicles 7<10mm  Cul de Sac  =========     Visualized. No free fluid visualized  Method    Partner SA:  low motility  Emmanuel Mukherjee     Component      Latest Ref Rng 11/6/2023   Andrology Lab ID# [id number]    Abstinence (days)      2 - 7 days 3    Collected Completely Yes    Collection Location Center    Collection Method Masturbation    Received time 1:45PM    Analyzed Time 2:03PM    Semen Appearance Normal    Semen Viscosity Abnormal    Semen Liquefaction Normal    Debris (Semen) Yes    Clumps (Semen) No    Agglutination (Semen) No    Volume (Semen)      1.5 mL 2.3     Concentration(Semen)      15 mill/mL 44.94    Total Motility (Semen)      40 % 16 !    Prog. Motility (Semen)      32 % 10 !    Non Prog. Motility (Semen)      % 6    Total No of Sperm (Semen)      39 mill 103.37    Total No of Motile (Semen)      mill 16.02    Total No of Rnd Cells (Semen)      5 mill 0.7    % Normal (Semen)      4 % 3.3 !    % Head defects (Semen)      % 96.0    % Neck Midpiece (Semen)      % 19.3    % Tail defects (Semen)      % 5.8    % Ex Residual Cytoplasm (Semen)      % 0.3    Tot. No of Norm. Motile Sperm (Semen)      mill 3.360    Tot. No of Norm. Sperm (Semen)      mill 0.521       Legend:  ! Abnormal  Treatment to date:   Fertility Cycles       Cycle Name Treatment Start Date Type Outcome    CC/IUI #5 (#4 cancelled) 09/05/2024 IUI Active     mg/IUI #4 08/17/2024 IUI Canceled    Clomid IUI #3 07/25/2024 IUI No Pregnancy    Clomid IUI #2 06/27/2024 IUI No Pregnancy    #1 clomid- IUI cancelled 05/30/2024 Timed Ericson No Pregnancy            Past Medical History:   Diagnosis Date    Anxiety     Asthma ([facility])     Depression     GERD (gastroesophageal reflux disease)     Migraine     CHUCK on CPAP     Palpitations     PTSD (post-traumatic stress disorder)     RLS (restless legs syndrome)     Tachycardia     Vitamin D deficiency      Past Surgical History:   Procedure Laterality Date    LAPAROSCOPIC NISSEN FUNDOPLICATION N/A 08/07/2017    MOUTH SURGERY      OTHER SURGICAL HISTORY  01/12/2016    EP Study     Current Outpatient Medications on File Prior to Visit   Medication Sig Dispense Refill    ALPRAZolam (Xanax) 0.25 mg tablet Take 1 tablet PO twice a day as needed for anxiety or for sleep. 10 tablet 0    aspirin-acetaminophen-caffeine (Excedrin Extra Strength) 250-250-65 mg tablet Take 2 tablets by mouth once daily.      azelastine (Astelin) 137 mcg (0.1 %) nasal spray Administer 1 spray into each nostril 2 times a day. Use in each nostril as directed 36 mL 2    chorionic  gonadotropin (Pregnyl) 10,000 unit injection Reconstitute according to instructions and inject 10,000 units (1 mL) under the skin as a one time dose, as directed per provider for trigger. 1 each 0    diphenhydrAMINE (Sominex) 25 mg tablet Take 1 tablet (25 mg) by mouth as needed at bedtime for sleep.      fluticasone (Flonase) 50 mcg/actuation nasal spray Administer 2 sprays into each nostril once daily at bedtime. Shake gently. Before first use, prime pump. After use, clean tip and replace cap. 16 g 2    letrozole (Femara) 2.5 mg tablet Take with or without food orally on cycle days 3 through 7 monthly. Hold if positive pregnancy test. 30 tablet 3    loratadine (Claritin) 10 mg tablet Take 1 tablet (10 mg) by mouth once daily.      methocarbamol (Robaxin) 500 mg tablet Take 1 tablet (500 mg) by mouth 3 times a day for 7 days. As needed for pain 21 tablet 0    PRENATAL 2-IRON-FOLIC ACID-OM3 ORAL Prenatal Adult Gummy/DHA/FA      progesterone (Prometrium) 100 mg capsule Insert 1 capsule (100 mg) into the vagina 2 times a day. Starting 3 days after IUI 60 capsule 0    progesterone (Prometrium) 200 mg capsule Take 1 capsule PO every 12 hours on menstrual cycle days 15 through 26. 36 capsule 3     No current facility-administered medications on file prior to visit.       BMI:   BMI Readings from Last 1 Encounters:   24 23.43 kg/m²     VITALS:  There were no vitals taken for this visit.  LMP: No LMP recorded.    ASSESSMENT   39 y.o.  female with primary infertility x 14 months, Male infertility and the following pertinent medical issues: sleep apnea, anxiety. S/p IUI x 3 without success- has had issues with premature ovulation.     PLAN    We reviewed IVF and discussed the following:   In-vitro fertilization and embryo transfer  Stimulation protocols   Oocyte retrieval, risks    Cryopreservation   Assessment of fertilization   Embryo development  Statistics  ICSI/Assisted hatching   Embryo transfer and  preparation    Risks of OHSS and multiple gestation   Cancelled cycles   Use of birth control   Selective reduction   Number of embryos to transfer   Ectopic pregnancy  and miscarriage  Team based care  Informed consent procedures  Folic acid supplementation   Genetic carrier screening   PGT  Frozen tissue storage and transport process  Discussed that pap and mammogram must be updated per ACOG guidelines before treatment can begin    ART Cycle Plan    1. Protocol/Fertility Plan Update:   Lead in: OCP- started today in hopes of fitting in this year (reached out to Sparkle Page)  Stimulation protocol: ANT  and menopur 75  Trigger plan: HCG vs Lupron  Pre-retrieval meds: Antibiotics per protocol  Adjuncts: NONE  Notes:     2. FET:  Protocol: Programmed  Adjuncts:  ASA 81 mg  OCP candidate: Yes  Notes:     3. Insemination:  Sperm source: partner  Sperm collection method: Fresh with Frozen Backup  Notes:  ICSI: Yes  # of oocytes to be fertilized: all    4. Transfer: WANTS TO DO FRESH TRANSFER IF POSSIBLE- UNDERSTANDS FOR THIS TO OCCUR IT MUST 1) NOT BE A WEEKEND and 2) MUST NOT COMPROMISE SUCCESS (E2 and prog as well as uterine appearance have to be ideal)  ALSO WANTS TO DO TRANSFER UNDER ANESTHESIA DUE TO PRIOR DIFFICULTY WITH PELVIC PROCEDURES  Number of embryos to replace: 2 untested  Stage of embryo transfer: day 5  Trial transfer needed? No    5. Cryopreservation plan  PGT: No   Freeze all? Fresh Day 5 transfer of 2 blasts if possible and will freeze remainder  Oocyte cryopreservation: No    6. Patient willing to accept blood transfusion: Yes    7. RN to review chart, initiate IVF boarding pass, and assure completion of the following prior to proceeding with IVF stimulation:       No orders of the defined types were placed in this encounter.      STDs (Hepatitis B, Hepatitis C, HIV, Syphilis, GC/CT) for patient and partner (if applicable) to be completed within the last year (z11.3)  Genetic carrier testing:  waiver or carrier screen completed with clearance documentation by provider for both patient and partner (z13.71)  Rubella and varicella to be completed within the last five years (z11.59)   TSH to be completed within the last year (z13.29)  Type & Screen to be completed within the last year (z01.83)  AMH to be completed within the last year (z31.41)  Pre-IVF Imaging: Reference any orders placed by provider.  Cavity evaluation: hysteroscopy UNDER ANESTHESIA- orders placed  Frozen sperm sample: ensure frozen partner sample (z31.41) or verify donor sperm on site prior to stimulation start date.  Verify in EMR or obtain copy of patient’s last mammogram (if applicable) and pap smear results for provider review in boarding pass.  Enroll in Engaged MD and complete annual consent forms for IVF and cryotransport agreements.  BMI checklist for BMI <18 or >40  Consults: Nursing and Financial Consult.  PAT Consult: No  Ectopic Risk: No  Medically Complex: No  Additional consults NONE and review what is in the boarding pass.    - PARTNER NEEDS SPERM FREEZE    Tiffany Morales  10/26/2024  2:01 PM

## 2024-10-28 ENCOUNTER — TELEPHONE (OUTPATIENT)
Dept: ENDOCRINOLOGY | Facility: CLINIC | Age: 39
End: 2024-10-28

## 2024-10-28 DIAGNOSIS — Z01.812 PRE-PROCEDURE LAB EXAM: ICD-10-CM

## 2024-10-28 DIAGNOSIS — Z31.83 ENCOUNTER FOR ASSISTED REPRODUCTIVE FERTILITY CYCLE: ICD-10-CM

## 2024-10-28 DIAGNOSIS — N97.9 FEMALE INFERTILITY: ICD-10-CM

## 2024-10-28 DIAGNOSIS — G47.33 OSA (OBSTRUCTIVE SLEEP APNEA): Primary | ICD-10-CM

## 2024-10-28 NOTE — PROGRESS NOTES
Boarding Pass IVF    Age: 39 y.o.    Provider: Tiffany Moralse MD  Primary RN: Lisa  Reasons for Treatment: Male infertility  Last BMI  10/31/24 : 23.11 kg/m²       Past Medical History:   Diagnosis Date    Anxiety     Asthma     Asthma     Depression     GERD (gastroesophageal reflux disease)     Inappropriate sinus node tachycardia ([facility])     Migraine     CHUCK on CPAP     Palpitations     Pericarditis ([facility])     PTSD (post-traumatic stress disorder)     RLS (restless legs syndrome)     Sleep apnea     Tachycardia     Vitamin D deficiency        Date Done Consultation Results/Comments   10/26/2024 Medication Protocol Lead in: OCP  Fertility Plan Update: Antagonist  Menopur 75  Trigger plan: HCG vs Lupron  Pre-retrieval meds: Antibiotics per protocol  Adjuncts:  none    Notes:   WANTS TO DO FRESH TRANSFER IF POSSIBLE- UNDERSTANDS FOR THIS TO OCCUR IT MUST 1) NOT BE A WEEKEND and 2) MUST NOT COMPROMISE SUCCESS (E2 and prog as well as uterine appearance have to be ideal)  ALSO WANTS TO DO TRANSFER UNDER ANESTHESIA DUE TO PRIOR DIFFICULTY WITH PELVIC PROCEDURES    Plan to transfer 2 untested embryos    10/26/2024 IVF Consult  [x]    PGT-A/M? No   2024 IVF Information and Authorization (to be completed annually) Received and in chart: Yes (Nava Pereira RN)   2024  Waiver (Out) Form Received and in chart: Yes (Nava Pereira RN)   2024 [employer] Packet [x]    10/29/2024 Procedure Order Placed [x]       N/A MFM Consult Okay to proceed? N/A - MFM stated patient did not need preconception consult related to sleep apnea. Patient has CPAP    N/A Psych Consult Okay to proceed? N/A   N/A Genetics Consult Okay to proceed? N/A    Other    Date Done Female Labs Results/Comments   2024 T&S (Q 1 Year) ABO: A  Rh: POS  Antibody: NEG     2024 Hep B sAg Nonreactive   2024 Hep C AB Nonreactive   2024 HIV Nonreactive   2024 Syphilis Nonreactive   2024 GC/CT  GC: Negative  CT: Negative   4/9/2024 Rubella (Q 5 Years) Positive   4/9/2024 Varicella (Q 5 Years) Positive (A)   4/9/2024 TSH 0.68 (Ref range: 0.44 - 3.98 mIU/L)   10/30/2024 HgbA1C 5.1 (Ref range: See comment %)   4/9/2024 AMH 1.894   4/9/2024 Carrier Screening [facility] 2bP:   Authorization completed  Neg    Uterine Cavity Eval Pelvic US: 4/17/2024  Uterus:Visualized  Uterus position:anteverted  Myometrium:normal  Endometrium:trilaminar  Cervix details:normal  Uterus width33.8 mm  Uterus qverqr06.6 mm  Endometrial thickness, total4.5 mm  Right Ovary  =========     Rt ovary:Visualized  Rt ovary morphology:normal  Rt ovary D124.8 mm  Rt ovary D219.1 mm  Rt ovary D317.9 mm  Rt ovary Vol4.4 cmï¿½  Rt ovarian follicle(s):Follicles identified  Rt ovarian follicles other findings:Antral Follicles 6<10mm  Left Ovary  ========     Lt ovary:Visualized  Lt ovary morphology:normal  Lt ovary D124.1 mm  Lt ovary D218.7 mm  Lt ovary D318.5 mm  Lt ovary Vol4.4 cmï¿½  Lt ovarian follicle(s):Follicles identified  Lt ovarian follicle D19.1 mm  Lt ovarian follicle D29.9 mm  Lt ovarian follicle mean9.5 mm  Lt ovarian follicle vol0.428 cmï¿½  Lt ovarian follicles other findings:Antral Follicles 7<10mm  Cul de Sac  =========     Visualized. No free fluid visualized    HSG: FL HYSTEROSALPINGOGRAM (11/13/2023):   IMPRESSION:  Normal HSG with bilateral fallopian tube patency demonstrated.    SIS: N/A    Hyster: 10/31/2024  Findings:   Cavity: Smooth cavity no lesions noted, areas of erythema diffusely throughout cavity  Ostia: Bilateral tubal ostia visualized  Additional Notes: Will treat empirically with doxycycline 100 mg BID x 14 days for presumed chronic endometritis   5/16/2024 Pap Smear Negative for Intraepithelial lesion or malignancy    HPV: Negative   N/A Mammogram ( > 40)            Date Done Male Labs   Results/Comments  CURT AGUILA (MRN: [id number])   4/9/2024 Hep B sAg Nonreactive   4/9/2024 Hep C AB  Nonreactive   4/9/2024  HIV Nonreactive   4/9/2024 Syphilis Nonreactive   4/9/2024 GC/CT GC: Negative  CT: Negative   5/30/2024 Carrier Screening   Declined and Authorization completed  N/A   9/14/2024 Semen Analysis  Volume(mL): 2  Concentration(million/mL): 46.71  Motility(%): 60  Motile Count(million): 3.36   11/6/2024 Sperm Freeze  # of vials: 2  TMS post thaw: 3.39   Date Done Miscellaneous Results/Comments   N/A BMI Checklist  BMI > 40 or < 18 Added to chart:      N/A >= 45 Checklist  Added to chart:      **Does not need to be completed prior to placing on IVF calendar**    MD Completion:  PAT needed: Yes due to CHUCK with CPAP use   Ectopic Risk: No  Medically Complex: Yes - sleep apnea     Harika Rubio 11/14/24 3:07 PM

## 2024-10-29 ENCOUNTER — TELEPHONE (OUTPATIENT)
Dept: ENDOCRINOLOGY | Facility: CLINIC | Age: 39
End: 2024-10-29
Payer: COMMERCIAL

## 2024-10-29 ENCOUNTER — DOCUMENTATION (OUTPATIENT)
Dept: ENDOCRINOLOGY | Facility: CLINIC | Age: 39
End: 2024-10-29
Payer: COMMERCIAL

## 2024-10-29 DIAGNOSIS — Z31.41 FERTILITY TESTING: ICD-10-CM

## 2024-10-29 DIAGNOSIS — E28.2 POLYCYSTIC OVARIAN SYNDROME: Primary | ICD-10-CM

## 2024-10-29 RX ORDER — LEUPROLIDE ACETATE 1 MG/0.2ML
4 KIT SUBCUTANEOUS AS NEEDED
Qty: 1 KIT | Refills: 0 | Status: SHIPPED | OUTPATIENT
Start: 2024-10-29

## 2024-10-29 RX ORDER — GANIRELIX ACETATE 250 UG/.5ML
250 INJECTION, SOLUTION SUBCUTANEOUS EVERY MORNING
Qty: 5 EACH | Refills: 2 | Status: SHIPPED | OUTPATIENT
Start: 2024-10-29

## 2024-10-29 RX ORDER — CHORIONIC GONADOTROPIN 10000 UNIT
10000 KIT INTRAMUSCULAR ONCE AS NEEDED
Qty: 1 EACH | Refills: 0 | Status: SHIPPED | OUTPATIENT
Start: 2024-10-29

## 2024-10-29 NOTE — TELEPHONE ENCOUNTER
Called the patient there was no answer Left a message to call the office    JEREMIAH ALVARENGA 10/29/24 1:56 PM

## 2024-10-30 ENCOUNTER — LAB (OUTPATIENT)
Dept: LAB | Facility: LAB | Age: 39
End: 2024-10-30
Payer: COMMERCIAL

## 2024-10-30 ENCOUNTER — PREP FOR PROCEDURE (OUTPATIENT)
Dept: ENDOCRINOLOGY | Facility: CLINIC | Age: 39
End: 2024-10-30

## 2024-10-30 DIAGNOSIS — Z31.41 FERTILITY TESTING: ICD-10-CM

## 2024-10-30 DIAGNOSIS — Z01.812 PRE-PROCEDURE LAB EXAM: ICD-10-CM

## 2024-10-30 DIAGNOSIS — N97.9 FEMALE INFERTILITY: ICD-10-CM

## 2024-10-30 LAB
EST. AVERAGE GLUCOSE BLD GHB EST-MCNC: 100 MG/DL
ESTRADIOL SERPL-MCNC: 34 PG/ML
HBA1C MFR BLD: 5.1 %
HCT VFR BLD AUTO: 39.2 % (ref 36–46)
LH SERPL-ACNC: 13.6 IU/L
PROGEST SERPL-MCNC: 0.2 NG/ML

## 2024-10-30 PROCEDURE — 36415 COLL VENOUS BLD VENIPUNCTURE: CPT

## 2024-10-30 PROCEDURE — 83036 HEMOGLOBIN GLYCOSYLATED A1C: CPT

## 2024-10-30 PROCEDURE — 83002 ASSAY OF GONADOTROPIN (LH): CPT

## 2024-10-30 PROCEDURE — 82670 ASSAY OF TOTAL ESTRADIOL: CPT

## 2024-10-30 PROCEDURE — 84144 ASSAY OF PROGESTERONE: CPT

## 2024-10-30 PROCEDURE — 85014 HEMATOCRIT: CPT

## 2024-10-30 RX ORDER — MORPHINE SULFATE 2 MG/ML
2 INJECTION, SOLUTION INTRAMUSCULAR; INTRAVENOUS AS NEEDED
Status: CANCELLED | OUTPATIENT
Start: 2024-10-30

## 2024-10-30 RX ORDER — KETOROLAC TROMETHAMINE 30 MG/ML
30 INJECTION, SOLUTION INTRAMUSCULAR; INTRAVENOUS ONCE AS NEEDED
Status: CANCELLED | OUTPATIENT
Start: 2024-10-30 | End: 2024-11-04

## 2024-10-30 RX ORDER — KETOROLAC TROMETHAMINE 30 MG/ML
30 INJECTION, SOLUTION INTRAMUSCULAR; INTRAVENOUS ONCE
Status: CANCELLED | OUTPATIENT
Start: 2024-10-30 | End: 2024-10-30

## 2024-10-30 RX ORDER — HYDROCODONE BITARTRATE AND ACETAMINOPHEN 5; 325 MG/1; MG/1
1 TABLET ORAL ONCE AS NEEDED
Status: CANCELLED | OUTPATIENT
Start: 2024-10-30

## 2024-10-30 RX ORDER — ACETAMINOPHEN 325 MG/1
650 TABLET ORAL ONCE AS NEEDED
Status: CANCELLED | OUTPATIENT
Start: 2024-10-30

## 2024-10-30 RX ORDER — OXYCODONE AND ACETAMINOPHEN 5; 325 MG/1; MG/1
1 TABLET ORAL EVERY 6 HOURS PRN
Status: CANCELLED | OUTPATIENT
Start: 2024-10-30

## 2024-10-30 RX ORDER — ONDANSETRON HYDROCHLORIDE 2 MG/ML
4 INJECTION, SOLUTION INTRAVENOUS AS NEEDED
Status: CANCELLED | OUTPATIENT
Start: 2024-10-30

## 2024-10-30 RX ORDER — HYDROMORPHONE HYDROCHLORIDE 0.2 MG/ML
0.2 INJECTION INTRAMUSCULAR; INTRAVENOUS; SUBCUTANEOUS
Status: CANCELLED | OUTPATIENT
Start: 2024-10-30

## 2024-10-31 ENCOUNTER — ANESTHESIA EVENT (OUTPATIENT)
Dept: ENDOCRINOLOGY | Facility: CLINIC | Age: 39
End: 2024-10-31
Payer: COMMERCIAL

## 2024-10-31 ENCOUNTER — ANESTHESIA (OUTPATIENT)
Dept: ENDOCRINOLOGY | Facility: CLINIC | Age: 39
End: 2024-10-31
Payer: COMMERCIAL

## 2024-10-31 ENCOUNTER — HOSPITAL ENCOUNTER (OUTPATIENT)
Dept: ENDOCRINOLOGY | Facility: CLINIC | Age: 39
Discharge: HOME | End: 2024-10-31
Payer: COMMERCIAL

## 2024-10-31 VITALS
HEART RATE: 64 BPM | DIASTOLIC BLOOD PRESSURE: 61 MMHG | BODY MASS INDEX: 23.14 KG/M2 | TEMPERATURE: 97.5 F | HEIGHT: 65 IN | SYSTOLIC BLOOD PRESSURE: 107 MMHG | WEIGHT: 138.89 LBS | RESPIRATION RATE: 14 BRPM | OXYGEN SATURATION: 98 %

## 2024-10-31 DIAGNOSIS — N71.1 CHRONIC ENDOMETRITIS: Primary | ICD-10-CM

## 2024-10-31 DIAGNOSIS — Z31.41 FERTILITY TESTING: ICD-10-CM

## 2024-10-31 LAB — PREGNANCY TEST URINE, POC: NEGATIVE

## 2024-10-31 PROCEDURE — 58555 HYSTEROSCOPY DX SEP PROC: CPT | Performed by: STUDENT IN AN ORGANIZED HEALTH CARE EDUCATION/TRAINING PROGRAM

## 2024-10-31 PROCEDURE — 7100000010 HC PHASE TWO TIME - EACH INCREMENTAL 1 MINUTE

## 2024-10-31 PROCEDURE — 2500000004 HC RX 250 GENERAL PHARMACY W/ HCPCS (ALT 636 FOR OP/ED): Performed by: ANESTHESIOLOGIST ASSISTANT

## 2024-10-31 PROCEDURE — 7100000009 HC PHASE TWO TIME - INITIAL BASE CHARGE

## 2024-10-31 PROCEDURE — 3700000002 HC GENERAL ANESTHESIA TIME - EACH INCREMENTAL 1 MINUTE

## 2024-10-31 PROCEDURE — 3700000001 HC GENERAL ANESTHESIA TIME - INITIAL BASE CHARGE

## 2024-10-31 RX ORDER — HYDROCODONE BITARTRATE AND ACETAMINOPHEN 5; 325 MG/1; MG/1
1 TABLET ORAL ONCE AS NEEDED
Status: DISCONTINUED | OUTPATIENT
Start: 2024-10-31 | End: 2024-11-01 | Stop reason: HOSPADM

## 2024-10-31 RX ORDER — KETOROLAC TROMETHAMINE 30 MG/ML
30 INJECTION, SOLUTION INTRAMUSCULAR; INTRAVENOUS ONCE
Status: DISCONTINUED | OUTPATIENT
Start: 2024-10-31 | End: 2024-11-01 | Stop reason: HOSPADM

## 2024-10-31 RX ORDER — PROPOFOL 10 MG/ML
INJECTION, EMULSION INTRAVENOUS AS NEEDED
Status: DISCONTINUED | OUTPATIENT
Start: 2024-10-31 | End: 2024-10-31

## 2024-10-31 RX ORDER — FENTANYL CITRATE 50 UG/ML
INJECTION, SOLUTION INTRAMUSCULAR; INTRAVENOUS AS NEEDED
Status: DISCONTINUED | OUTPATIENT
Start: 2024-10-31 | End: 2024-10-31

## 2024-10-31 RX ORDER — HYDROMORPHONE HYDROCHLORIDE 2 MG/ML
0.2 INJECTION, SOLUTION INTRAMUSCULAR; INTRAVENOUS; SUBCUTANEOUS
Status: DISCONTINUED | OUTPATIENT
Start: 2024-10-31 | End: 2024-11-01 | Stop reason: HOSPADM

## 2024-10-31 RX ORDER — MORPHINE SULFATE 2 MG/ML
2 INJECTION, SOLUTION INTRAMUSCULAR; INTRAVENOUS AS NEEDED
Status: DISCONTINUED | OUTPATIENT
Start: 2024-10-31 | End: 2024-11-01 | Stop reason: HOSPADM

## 2024-10-31 RX ORDER — KETOROLAC TROMETHAMINE 30 MG/ML
30 INJECTION, SOLUTION INTRAMUSCULAR; INTRAVENOUS ONCE AS NEEDED
Status: DISCONTINUED | OUTPATIENT
Start: 2024-10-31 | End: 2024-11-01 | Stop reason: HOSPADM

## 2024-10-31 RX ORDER — DOXYCYCLINE 100 MG/1
100 CAPSULE ORAL 2 TIMES DAILY
Qty: 28 CAPSULE | Refills: 0 | Status: SHIPPED | OUTPATIENT
Start: 2024-10-31 | End: 2024-11-14

## 2024-10-31 RX ORDER — OXYCODONE AND ACETAMINOPHEN 5; 325 MG/1; MG/1
1 TABLET ORAL EVERY 6 HOURS PRN
Status: DISCONTINUED | OUTPATIENT
Start: 2024-10-31 | End: 2024-11-01 | Stop reason: HOSPADM

## 2024-10-31 RX ORDER — ONDANSETRON HYDROCHLORIDE 2 MG/ML
INJECTION, SOLUTION INTRAVENOUS AS NEEDED
Status: DISCONTINUED | OUTPATIENT
Start: 2024-10-31 | End: 2024-10-31

## 2024-10-31 RX ORDER — KETOROLAC TROMETHAMINE 30 MG/ML
INJECTION, SOLUTION INTRAMUSCULAR; INTRAVENOUS AS NEEDED
Status: DISCONTINUED | OUTPATIENT
Start: 2024-10-31 | End: 2024-10-31

## 2024-10-31 RX ORDER — MIDAZOLAM HYDROCHLORIDE 1 MG/ML
INJECTION, SOLUTION INTRAMUSCULAR; INTRAVENOUS AS NEEDED
Status: DISCONTINUED | OUTPATIENT
Start: 2024-10-31 | End: 2024-10-31

## 2024-10-31 RX ORDER — ONDANSETRON HYDROCHLORIDE 2 MG/ML
4 INJECTION, SOLUTION INTRAVENOUS AS NEEDED
Status: DISCONTINUED | OUTPATIENT
Start: 2024-10-31 | End: 2024-11-01 | Stop reason: HOSPADM

## 2024-10-31 RX ORDER — ACETAMINOPHEN 325 MG/1
650 TABLET ORAL ONCE AS NEEDED
Status: DISCONTINUED | OUTPATIENT
Start: 2024-10-31 | End: 2024-11-01 | Stop reason: HOSPADM

## 2024-10-31 SDOH — HEALTH STABILITY: MENTAL HEALTH: CURRENT SMOKER: 0

## 2024-10-31 ASSESSMENT — PAIN SCALES - GENERAL
PAINLEVEL_OUTOF10: 0 - NO PAIN

## 2024-10-31 ASSESSMENT — PAIN - FUNCTIONAL ASSESSMENT
PAIN_FUNCTIONAL_ASSESSMENT: 0-10

## 2024-10-31 ASSESSMENT — COLUMBIA-SUICIDE SEVERITY RATING SCALE - C-SSRS
2. HAVE YOU ACTUALLY HAD ANY THOUGHTS OF KILLING YOURSELF?: NO
6. HAVE YOU EVER DONE ANYTHING, STARTED TO DO ANYTHING, OR PREPARED TO DO ANYTHING TO END YOUR LIFE?: NO
1. IN THE PAST MONTH, HAVE YOU WISHED YOU WERE DEAD OR WISHED YOU COULD GO TO SLEEP AND NOT WAKE UP?: NO

## 2024-11-07 ENCOUNTER — APPOINTMENT (OUTPATIENT)
Dept: ENDOCRINOLOGY | Facility: CLINIC | Age: 39
End: 2024-11-07
Payer: COMMERCIAL

## 2024-11-12 ENCOUNTER — TELEPHONE (OUTPATIENT)
Dept: ENDOCRINOLOGY | Facility: CLINIC | Age: 39
End: 2024-11-12
Payer: COMMERCIAL

## 2024-11-12 PROCEDURE — RXMED WILLOW AMBULATORY MEDICATION CHARGE

## 2024-11-12 NOTE — TELEPHONE ENCOUNTER
See mychart message encounter for conversation regarding consent forms.     Nava Pereira 11/12/24 1:32 PM

## 2024-11-13 ENCOUNTER — SPECIALTY PHARMACY (OUTPATIENT)
Dept: PHARMACY | Facility: CLINIC | Age: 39
End: 2024-11-13

## 2024-11-14 ENCOUNTER — ANCILLARY PROCEDURE (OUTPATIENT)
Dept: ENDOCRINOLOGY | Facility: CLINIC | Age: 39
End: 2024-11-14
Payer: COMMERCIAL

## 2024-11-14 ENCOUNTER — PHARMACY VISIT (OUTPATIENT)
Dept: PHARMACY | Facility: CLINIC | Age: 39
End: 2024-11-14
Payer: COMMERCIAL

## 2024-11-14 ENCOUNTER — SPECIALTY PHARMACY (OUTPATIENT)
Dept: PHARMACY | Facility: CLINIC | Age: 39
End: 2024-11-14

## 2024-11-14 ENCOUNTER — TELEPHONE (OUTPATIENT)
Dept: ENDOCRINOLOGY | Facility: CLINIC | Age: 39
End: 2024-11-14

## 2024-11-14 DIAGNOSIS — N97.9 FEMALE INFERTILITY: ICD-10-CM

## 2024-11-14 LAB — ESTRADIOL SERPL-MCNC: <20 PG/ML

## 2024-11-14 PROCEDURE — 76857 US EXAM PELVIC LIMITED: CPT

## 2024-11-14 PROCEDURE — 82670 ASSAY OF TOTAL ESTRADIOL: CPT

## 2024-11-14 NOTE — PROGRESS NOTES
[place] NOTE - IVF STIM BASELINE  Cycle #: 1  Reason For Treatment: Male Infertility  Patient Hx: 39 year old, AMH = 1.894. Patient of Dr. Morales    Patient presents for baseline ultrasound and/or labs.    Treatment protocol: Antagonist  Menopur 75  Lead in: OCP  Start date for lead in: 10/26  Last estrace/OCP date: 11/13  PGT-A/M? No    Plan to freeze: embryos - plan for fresh transfer if possible.     [employer] forms/out waiver complete:  Yes    Does patient have medications onhand?  No patient stated they will be delivered today  Pharmacy: [facility]     Boarding pass signed off:  No Patient needed to sign IVF consent - patient wanted to dispose of embryos even if transfer fails, so awaiting f/u from primary MD    Date of Female STDs: 4/9/2024    Date of Male STDs: 4/9/2024    Medically complex on boarding pass:   no    If indicated, is PAT consult complete?  N/A    SPERM:  partner  Fresh with Frozen Backup  Number of vials confirmed: 2 at  on 11/14/24 by SHINE    Additional details:   WANTS TO DO FRESH TRANSFER IF POSSIBLE- UNDERSTANDS FOR THIS TO OCCUR IT MUST 1) NOT BE A WEEKEND and 2) MUST NOT COMPROMISE SUCCESS (E2 and prog as well as uterine appearance have to be ideal)  ALSO WANTS TO DO TRANSFER UNDER ANESTHESIA DUE TO PRIOR DIFFICULTY WITH PELVIC PROCEDURES  Number of embryos to replace: 2 untested  Stage of embryo transfer: day 5      Nava Pereira  11/14/2024  8:40 AM    Michelle Forte 11/14/24 12:49 PM        LM with patient with plan. Patient told to not take another OCP today, and to start FSH and Menopur on 11/17 in the evening. Patient told to call office back to schedule follicle scan and E2 on 11/20.   Nava Pereira 11/14/24 1:15 PM    
No

## 2024-11-14 NOTE — PROGRESS NOTES
IVF NURSE CONSULT  Here for IVF Nurse consult.    Tentative Calendar given and reviewed. Yes    Consents sent via Engaged MD:      IVF: Yes  Genetic Auth: Yes  PAT form: Yes    Financial consult requirement reviewed with patient: Yes    Meds have been ordered from: [facility] - patient stated they will be delivered today    Patient aware that plan is a freeze all cycle.   Yes - planning fresh transfer if possible   IC/ abstinence and activity guidelines reviewed. Yes  Next steps: Patient scheduled for IVF baseline on today.     Nava Pereira  11/14/2024  7:03 AM

## 2024-11-14 NOTE — PROGRESS NOTES
[facility] Clinical Note  Initial Patient Education- Fertility    Introduction  Taty Sosa is a 39 y.o. female who is on the specialty pharmacy service for management of: Fertility Core.    Taty Sosa is initiating the following therapy. Dose and directions will be documented below for each medication.   Gonal-f 900 unit Redi-Ject pen, Menopur 75 unit vials, Ganirelix 250mcg syringes, Pregnyl 10,000IU vial for trigger , and Leuprolide 1mg/0.2mL kit for trigger    Medication receipt date: 11/14/24 scheduled   Duration of therapy: Patient/Prescriber Specific- depends on response to medications for IVF stimulation    The most recent encounter visit with the referring prescriber Dr. Morales  on 10/26/24 was reviewed.  Pharmacy will continue to collaborate in the care of this patient with the referring prescriber.    Medications should only be prescribed by fertility specialists for this indication.    Clinical Background  An initial assessment was conducted prior to first fill of the medication to determine the appropriateness of therapy given the patient's diagnosis, medication list, comorbidities, allergies, medical history, patient's ability to self administer medication, and therapeutic goals based on possible outcomes of therapy. Refer to initial assessment task completed on 10/29/24.    Labs for clinical appropriateness were reviewed.     Education/Discussion  Taty was contacted on 11/14/2024 at 10:00 AM for a pharmacy visit with encounter number [id number] from:   [facility]  [address] [place] [id number]  Dept: [phone]  Dept Fax: [phone]  Taty consented to a/an Telephone visit, which was performed.    Medication Start Date (planned or actual): 11/17/24  Education was conducted prior to start of therapy? Yes    Education discussed includes the following:  Patient Education  Counseled the Patient on the  Following : Expected duration of therapy, Possible side effects and management, Safe handling, storage, and disposal, Pharmacy contact information  Learner: Patient  Education Method: Explanation  Education Response: Verbalizes understanding  Additional details of the medication specific counseling are found within the linked patient education flowsheet.     The follow up timeline was discussed. Every person responds to and reacts to therapy differently. Patient should be assessed for efficacy and tolerability in approximately: other - see below    Every person responds and reacts to therapy differently. Take as directed by your provider.  Adverse effects or efficacy to treatment can occur at any point during therapy.  Recommended contacting the  Fertility Center after starting therapy and sooner if symptoms worsen or new symptoms develop.     Patient was encouraged to reach out to their doctor's office if they develop signs of an allergic reaction to any medication prescribed as part of their fertility treatment.    Provided education on goals and possible outcomes of therapy:  Fertility: Successful administration of IVF Medication(s)  Stimulate ovaries to produce multiple eggs for retrieval (Gonal-F, Follistim, Menopur, Low Dose HCG)  Suppress ovaries to prevent premature ovulation (Ganirelix/Cetrotide, Microdose & Long Lupron)  Trigger ovulation to prepare for egg retrieval (Lupron/Pregnyl)    The importance of adherence was discussed and they were advised to take the medication as prescribed by their provider.     Patient is being seen by the  Fertility Clinic and is undergoing IVF treatment. Specific education for the following medications was completed with the patient for the Antagonist protocol.     [facility]  Patient Management Program- Initial Patient Education Gonal-F/Follitropin [place]     Clinical Intervention: Gonal-F Redi-Ject Pen Initial Patient Education    Warnings,  Precautions, and Adverse Reactions:   - Discussed common adverse effects, warnings and precautions pertinent to the medication including but not limited to: headache, fatigue, and nausea.   - Some patients may experience injection site reactions, such as pain, swelling, inflammation, or bruising.  - General side effects as the ovaries grow in size include but are not limited to: bloating, abdominal fullness, and a wide range of emotions.  - Patient was encouraged to reach out to their doctor's office if they develop signs of ovarian hyperstimulation syndrome (OHSS) which is discussed during encounter with provider based on patient specific risk factors.    Handling and Storage   Prior to dispensing, store refrigerated at 36°F to 46°F.   After dispensed, may be stored under refrigeration at 36°F to 46°F until expiration date or at room temperature (77°F) for up to 3 months.   Once cartridge is pierced, can be stored at 36°F to 77°F and used within 28 days.   Do not freeze. Protect from light.      [facility]  Patient Management Program- Initial Patient Education: Menopur/Menotropins    Clinical Intervention: Menopur Initial Patient Education        Warnings, Precautions, and Adverse Reactions:   - Discussed common adverse effects, warnings and precautions pertinent to the medication including but not limited to: a burning sensation when the medication is administered, headache, fatigue, and nausea.   - Some patients may experience injection site reactions, such as pain, swelling, inflammation, or bruising along with a burning/stinging sensation with injection.  - General side effects as the ovaries grow in size include but are not limited to: bloating, abdominal fullness, and a wide range of emotions.  - Patient was encouraged to reach out to their doctor's office if they develop: signs of ovarian hyperstimulation syndrome (OHSS) which is discussed during encounter with provider based on  patient specific risk factors.      Handling and Storage   Store at room temperature (36°F to 77°F). Protect from sunlight. Use immediately after reconstitution/mixing. Hazardous handling considerations should be observed.      [facility]  Patient Management Program- Initial Patient Education: Ganirelix/Fyremadel    Clinical Intervention: Ganirelix Initial Patient Education      Warnings, Precautions, and Adverse Reactions:   - Discussed common adverse effects, warnings and precautions pertinent to the medication including but not limited to: headache, nausea.   -Some patients may experience injection site reactions, such as pain, swelling, inflammation, or bruising.    - Patient was encouraged to reach out to their doctor's office if they develop signs of an allergic reaction, specifically to LATEX.   The packaging may contain dry natural rubber/latex.    Handling and Storage:   Store at room temperature (77°F); excursions are permitted between 59°F and 86°F. Protect from light. Hazardous handling considerations should be observed.    [facility]  Patient Management Program- Initial Patient Education:  Pregnyl /Chorionic Gonadotropin (Human) - Trigger    Clinical Intervention: Pregnyl (HCG) Trigger Initial Patient Education      Warnings, Precautions, and Adverse Reactions:   - Some patients may experience injection site reactions, such as pain, swelling, inflammation, or bruising.     Handling and Storage   Store intact vials at room temperature (59°F to 86°F). Following reconstitution, solution is stable when refrigerated (36ºF to 46ºF) for 60 days (Pregnyl). Hazardous handling considerations should be observed.        [facility]  Patient Management Program- Initial Patient Education:  Lupron/Leuprolide Acetate - Trigger    Clinical Intervention: Lupron for Trigger Initial Patient Education    Warnings, Precautions, and Adverse  Reactions:   - Some patients may experience injection site reactions, such as pain, swelling, inflammation, or bruising.     Handling and Storage   Store at room temperature (68°F to 77°F); excursions permitted to 59°F to 86°F. Do not freeze. Protect from light and store vial in carton until use. Hazardous handling considerations should be observed.    Reviewed both Lupron and HCG triggers today, stressed not to inject until directed by RN or provider as they will determine final dose and if doing a single trigger or co-trigger. Pt voiced understanding.      Impression/Plan  Review and Assessment   Reviewed During This Encounter: Allergies, Medications, Problem list  Medications Assessed for Appropriate Use, Dose, Route, Frequency, and Duration: No (Comment) (Patient declined full medication education, just left office after baseline and had RN teaching. We reviewed storage and side effects.)  Medication Reconciliation Completed: Yes  Drug Interactions Evaluated: Yes  Clinically Relevant Drug Interactions Identified: No    This patient has not been identified as high risk.   The following action was taken: N/A.    QOL/Patient Satisfaction  Rate your quality of life on scale of 1-10: 10 - Completely satisfied  Rate your satisfaction with  Specialty Pharmacy on scale of 1-10: 10 - Completely satisfied    Provided contact information ([phone]) for [facility] and reviewed dispensing process, refill timeline, and patient management follow up. Advised to contact the pharmacy if there are any adverse effects and/or changes to medication list, including prescriptions, OTC medications, herbal products, or supplements. Confirmed understanding of education conducted during assessment. All questions and concerns were addressed and patient was encouraged to reach out for additional questions or concerns.      Adelaide Lund (Katie), PharmD  [facility]  Clinical  Pharmacy Specialist- Fertility   [facility], [facility] [place]  [address] [place] [address]  Email: [email or web address]  Tel: [phone]       Fax: [phone]

## 2024-11-14 NOTE — TELEPHONE ENCOUNTER
Returned patient's call. 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 She is aware of weight restrictions during IVF stim, and unfortunately her manager will not allow her to have light duty excuse even with intermittent FMLA. Patient is requesting FMLA to cover entire time period from IVF medication start to time of retrieval/period after retrieval. Will review with Dr. Morales to confirm ok to proceed.     Nava Pereira 11/14/24 2:45 PM

## 2024-11-15 ENCOUNTER — DOCUMENTATION (OUTPATIENT)
Dept: ENDOCRINOLOGY | Facility: CLINIC | Age: 39
End: 2024-11-15
Payer: COMMERCIAL

## 2024-11-15 NOTE — PROGRESS NOTES
MD Nava Jerome RN  Can you please paste into her chart that we spoke about embryo disposition tonight and she wants to void the original forms and use the second set of forms that indicate that yes she wants to send cryopreserved excess embryos to reproach and not to discard any embryos. Thank you! Tiffany

## 2024-11-20 ENCOUNTER — ANCILLARY PROCEDURE (OUTPATIENT)
Dept: ENDOCRINOLOGY | Facility: CLINIC | Age: 39
End: 2024-11-20
Payer: COMMERCIAL

## 2024-11-20 ENCOUNTER — APPOINTMENT (OUTPATIENT)
Dept: ENDOCRINOLOGY | Facility: CLINIC | Age: 39
End: 2024-11-20
Payer: COMMERCIAL

## 2024-11-20 ENCOUNTER — APPOINTMENT (OUTPATIENT)
Dept: SLEEP MEDICINE | Facility: CLINIC | Age: 39
End: 2024-11-20
Payer: COMMERCIAL

## 2024-11-20 DIAGNOSIS — N97.9 FEMALE INFERTILITY: ICD-10-CM

## 2024-11-20 LAB — ESTRADIOL SERPL-MCNC: 499 PG/ML

## 2024-11-20 PROCEDURE — 76857 US EXAM PELVIC LIMITED: CPT | Performed by: OBSTETRICS & GYNECOLOGY

## 2024-11-20 PROCEDURE — 76857 US EXAM PELVIC LIMITED: CPT

## 2024-11-20 PROCEDURE — 82670 ASSAY OF TOTAL ESTRADIOL: CPT

## 2024-11-20 NOTE — PROGRESS NOTES
CYCLING NOTE - IVF  Cycle #: 1  Reason For Treatment: Male Infertility  Protocol: OCP lead, Antagonist  Menopur 75  Day of stim: Back after 3 days of meds  Patient Hx: 39 year old, AMH = 1.894. Patient of Dr. Morales   Notes: Wants to do a fresh transfer if possible and plan is to transfer 2 untested embryos     Here for US and/or lab monitoring; relevant findings reviewed.    Patient stayed for nurse visit. Pain is 0/10  Team will contact patient later today with results and plan.    Nava Pereira  11/20/2024  7:24 AM        Called patient with plan. Patient aware to decrease FSH to 275 units daily, continue Menopur 75 and start Antagonist tomorrow am.   Patient is already scheduled for 11/22.   Nava Pereira 11/20/24 1:23 PM

## 2024-11-21 ENCOUNTER — TELEPHONE (OUTPATIENT)
Dept: PREADMISSION TESTING | Facility: HOSPITAL | Age: 39
End: 2024-11-21
Payer: COMMERCIAL

## 2024-11-21 ENCOUNTER — APPOINTMENT (OUTPATIENT)
Dept: SLEEP MEDICINE | Facility: CLINIC | Age: 39
End: 2024-11-21
Payer: COMMERCIAL

## 2024-11-22 ENCOUNTER — DOCUMENTATION (OUTPATIENT)
Dept: PREADMISSION TESTING | Facility: HOSPITAL | Age: 39
End: 2024-11-22

## 2024-11-22 ENCOUNTER — LAB (OUTPATIENT)
Dept: LAB | Facility: LAB | Age: 39
End: 2024-11-22
Payer: COMMERCIAL

## 2024-11-22 ENCOUNTER — ANCILLARY PROCEDURE (OUTPATIENT)
Dept: ENDOCRINOLOGY | Facility: CLINIC | Age: 39
End: 2024-11-22
Payer: COMMERCIAL

## 2024-11-22 ENCOUNTER — PRE-ADMISSION TESTING (OUTPATIENT)
Dept: PREADMISSION TESTING | Facility: HOSPITAL | Age: 39
End: 2024-11-22
Payer: COMMERCIAL

## 2024-11-22 ENCOUNTER — APPOINTMENT (OUTPATIENT)
Dept: ENDOCRINOLOGY | Facility: CLINIC | Age: 39
End: 2024-11-22
Payer: COMMERCIAL

## 2024-11-22 VITALS
BODY MASS INDEX: 23.14 KG/M2 | OXYGEN SATURATION: 98 % | DIASTOLIC BLOOD PRESSURE: 86 MMHG | RESPIRATION RATE: 16 BRPM | HEIGHT: 65 IN | TEMPERATURE: 98.7 F | SYSTOLIC BLOOD PRESSURE: 128 MMHG | HEART RATE: 73 BPM | WEIGHT: 138.89 LBS

## 2024-11-22 DIAGNOSIS — Z01.818 PREOP TESTING: ICD-10-CM

## 2024-11-22 DIAGNOSIS — Z01.818 PREOP TESTING: Primary | ICD-10-CM

## 2024-11-22 DIAGNOSIS — N97.9 FEMALE INFERTILITY: ICD-10-CM

## 2024-11-22 LAB
BASOPHILS # BLD AUTO: 0.05 X10*3/UL (ref 0–0.1)
BASOPHILS NFR BLD AUTO: 0.6 %
EOSINOPHIL # BLD AUTO: 0.06 X10*3/UL (ref 0–0.7)
EOSINOPHIL NFR BLD AUTO: 0.7 %
ERYTHROCYTE [DISTWIDTH] IN BLOOD BY AUTOMATED COUNT: 11.9 % (ref 11.5–14.5)
ESTRADIOL SERPL-MCNC: 1133 PG/ML
HCT VFR BLD AUTO: 43 % (ref 36–46)
HGB BLD-MCNC: 13.9 G/DL (ref 12–16)
IMM GRANULOCYTES # BLD AUTO: 0.01 X10*3/UL (ref 0–0.7)
IMM GRANULOCYTES NFR BLD AUTO: 0.1 % (ref 0–0.9)
LYMPHOCYTES # BLD AUTO: 1.81 X10*3/UL (ref 1.2–4.8)
LYMPHOCYTES NFR BLD AUTO: 20.5 %
MCH RBC QN AUTO: 29.8 PG (ref 26–34)
MCHC RBC AUTO-ENTMCNC: 32.3 G/DL (ref 32–36)
MCV RBC AUTO: 92 FL (ref 80–100)
MONOCYTES # BLD AUTO: 0.71 X10*3/UL (ref 0.1–1)
MONOCYTES NFR BLD AUTO: 8.1 %
NEUTROPHILS # BLD AUTO: 6.17 X10*3/UL (ref 1.2–7.7)
NEUTROPHILS NFR BLD AUTO: 70 %
NRBC BLD-RTO: 0 /100 WBCS (ref 0–0)
PLATELET # BLD AUTO: 329 X10*3/UL (ref 150–450)
RBC # BLD AUTO: 4.66 X10*6/UL (ref 4–5.2)
WBC # BLD AUTO: 8.8 X10*3/UL (ref 4.4–11.3)

## 2024-11-22 PROCEDURE — 82670 ASSAY OF TOTAL ESTRADIOL: CPT

## 2024-11-22 PROCEDURE — 76857 US EXAM PELVIC LIMITED: CPT

## 2024-11-22 PROCEDURE — 36415 COLL VENOUS BLD VENIPUNCTURE: CPT

## 2024-11-22 PROCEDURE — 99244 OFF/OP CNSLTJ NEW/EST MOD 40: CPT | Performed by: PHYSICIAN ASSISTANT

## 2024-11-22 PROCEDURE — 85025 COMPLETE CBC W/AUTO DIFF WBC: CPT

## 2024-11-22 ASSESSMENT — ENCOUNTER SYMPTOMS
CONSTITUTIONAL NEGATIVE: 1
MUSCULOSKELETAL NEGATIVE: 1
NEUROLOGICAL NEGATIVE: 1
GASTROINTESTINAL NEGATIVE: 1
HEMATOLOGIC/LYMPHATIC NEGATIVE: 1
ALLERGIC/IMMUNOLOGIC NEGATIVE: 1
RESPIRATORY NEGATIVE: 1
EYES NEGATIVE: 1
PSYCHIATRIC NEGATIVE: 1
CARDIOVASCULAR NEGATIVE: 1
ENDOCRINE NEGATIVE: 1

## 2024-11-22 NOTE — CPM/PAT NURSE NOTE
CPM/PAT Nurse Note      Name: Taty Sosa (Taty Sosa)  /Age: 1985/39 y.o.       Past Medical History:   Diagnosis Date    Anxiety     Asthma     exercise induced    Depression     GERD (gastroesophageal reflux disease)     symptomatic    Inappropriate sinus node tachycardia (CMS-HCC)     Migraine     CHUCK on CPAP     Palpitations     Pericarditis (HHS-HCC)     15 years ago after wisdom tooth extraction    PTSD (post-traumatic stress disorder)     Restless leg syndrome     Vitamin D deficiency        Past Surgical History:   Procedure Laterality Date    HYSTEROSCOPY  10/31/2024    LAPAROSCOPIC NISSEN FUNDOPLICATION N/A 2017    OTHER SURGICAL HISTORY  2016    EP Study    WISDOM TOOTH EXTRACTION         Patient  reports being sexually active and has had partner(s) who are male. She reports using the following method of birth control/protection: None.    Family History   Problem Relation Name Age of Onset    Mental illness Mother 65     Hypertension Mother 65     Depression Mother 65     Anxiety disorder Mother 65     Other (prediabetes) Mother 65     Hypertension Father Steve Sosa     Depression Father Steve Sosa     Anxiety disorder Father Steve Sosa     Stroke Father Steve Sosa 60    Mental illness Father Steve Sosa     Hypertension Other      Heart disease Other         Allergies   Allergen Reactions    Procaine Palpitations    Adhesive Rash    Adhesive Tape-Silicones Rash and Other     Redness - Irritation       Prior to Admission medications    Medication Sig Start Date End Date Taking? Authorizing Provider   ALPRAZolam (Xanax) 0.25 mg tablet Take 1 tablet PO twice a day as needed for anxiety or for sleep.  Patient not taking: Reported on 2024   Elvira Morgan,    aspirin-acetaminophen-caffeine (Excedrin Extra Strength) 250-250-65 mg tablet Take 2 tablets by mouth once daily.    Historical Provider, MD   azelastine (Astelin) 137 mcg (0.1 %) nasal  spray Administer 1 spray into each nostril 2 times a day. Use in each nostril as directed  Patient not taking: Reported on 11/22/2024 2/8/24   Ricky Zuñiga MD   chorionic gonadotropin (Pregnyl) 10,000 unit injection Reconstitute according to instructions and inject 10,000 units (1 mL) under the skin as a one time dose, as directed per provider for trigger. 9/5/24   CHAYO Bruce-CNP   chorionic gonadotropin (Pregnyl) 10,000 unit injection Reconstitute according to instructions and inject 10,000 units (1 mL) under the skin as a one time dose, as directed per provider for trigger. 10/29/24   Tiffany Morales MD   diphenhydrAMINE (Sominex) 25 mg tablet Take 1 tablet (25 mg) by mouth as needed at bedtime for sleep.    Historical Provider, MD   fluticasone (Flonase) 50 mcg/actuation nasal spray Administer 2 sprays into each nostril once daily at bedtime. Shake gently. Before first use, prime pump. After use, clean tip and replace cap. 2/8/24   Ricky Zuñiga MD   follitropin swapna (Gonal-f) 900/1.5 unit/mL pen injector pen injection Inject 300 Units under the skin once daily in the evening as directed by provider 10/29/24   Tiffany Morales MD   ganirelix (Orgalutran) 250 mcg/0.5 mL syringe injection Inject 0.5 mL (250 mcg) under the skin once daily in the morning as directed per provider. 10/29/24   Tiffany Morales MD   letrozole (Femara) 2.5 mg tablet Take with or without food orally on cycle days 3 through 7 monthly. Hold if positive pregnancy test.  Patient not taking: Reported on 11/22/2024 10/18/23   Elvira Morgan DO   leuprolide (Lupron) 1 mg/0.2 mL injection Using an insulin syringe, draw up 80 units and inject under the skin as a one time dose, as directed per provider for trigger.  Patient not taking: Reported on 11/22/2024 10/29/24   Tiffany Morales MD   loratadine (Claritin) 10 mg tablet Take 1 tablet (10 mg) by mouth once daily.    Historical Provider, MD   menotropins (Menopur) 75 unit  recon soln injection Inject 75 Units under the skin once daily in the evening as directed by provider 10/29/24   Tiffany Morales MD   methocarbamol (Robaxin) 500 mg tablet Take 1 tablet (500 mg) by mouth 3 times a day for 7 days. As needed for pain 4/16/24 4/23/24  Percy Woodard MD   norgestimate-ethinyl estradioL (Ortho-Cyclen) 0.25-35 mg-mcg tablet Take 1 tablet by mouth once daily. 10/26/24 10/26/25  Tiffany Morales MD   PRENATAL 2-IRON-FOLIC ACID-OM3 ORAL Prenatal Adult Gummy/DHA/FA    Historical Provider, MD   progesterone (Prometrium) 100 mg capsule Insert 1 capsule (100 mg) into the vagina 2 times a day. Starting 3 days after IUI  Patient not taking: Reported on 11/22/2024 6/27/24 6/27/25  CHAYO Bruce-CNP   progesterone (Prometrium) 200 mg capsule Take 1 capsule PO every 12 hours on menstrual cycle days 15 through 26.  Patient not taking: Reported on 11/22/2024 10/18/23   DO FERDINAND Garnados     DASI Risk Score    No data to display       Caprini DVT Assessment    No data to display       Modified Frailty Index    No data to display       CHADS2 Stroke Risk  Current as of 26 minutes ago        N/A 3 to 100%: High Risk   2 to < 3%: Medium Risk   0 to < 2%: Low Risk     Last Change: N/A          This score determines the patient's risk of having a stroke if the patient has atrial fibrillation.        This score is not applicable to this patient. Components are not calculated.          Revised Cardiac Risk Index    No data to display       Apfel Simplified Score    No data to display       Risk Analysis Index Results This Encounter    No data found in the last 10 encounters.       Prodigy: High Risk  Total Score: 0          ARISCAT Score for Postoperative Pulmonary Complications    No data to display       Patrick Perioperative Risk for Myocardial Infarction or Cardiac Arrest (KATE)    No data to display         Nurse Plan of Action:     After Visit Summary (AVS) reviewed and patient  verbalized good understanding of medications and NPO instructions.

## 2024-11-22 NOTE — CPM/PAT H&P
[facility]/PAT Evaluation       Name: Taty Sosa (Taty Sosa)  /Age: 1985/39 y.o.     In-Person       Chief Complaint: infertility     HPI      Date of Consult: 24    Referring Provider: Dr. Morales     Surgery, Date, and Length: IVF procedure    Taty Sosa is a 39 year-old female  who presents to the [facility] for perioperative risk assessment prior to surgery.  Patient is G0 presented with primary infertility and male factor. Patient is s/p IUI x 3 without success.      This note was created in part upon personal review of patient's medical records.      Patient is scheduled to have IVF procedure     Medical History  Past Medical History:   Diagnosis Date    Anxiety     Asthma     exercise induced    Depression     GERD (gastroesophageal reflux disease)     symptomatic    Inappropriate sinus node tachycardia (CMS-HCC)     Migraine     CHUCK on CPAP     Palpitations     Pericarditis (HHS-HCC)     15 years ago after wisdom tooth extraction    PTSD (post-traumatic stress disorder)     Restless leg syndrome     Vitamin D deficiency         STOP BANG = +CHUCK    Caprini =  1 (procedure)    Surgical History  Past Surgical History:   Procedure Laterality Date    HYSTEROSCOPY  10/31/2024    LAPAROSCOPIC NISSEN FUNDOPLICATION N/A 2017    OTHER SURGICAL HISTORY  2016    EP Study    WISDOM TOOTH EXTRACTION               Family history:  Family History   Problem Relation Name Age of Onset    Mental illness Mother 65     Hypertension Mother 65     Depression Mother 65     Anxiety disorder Mother 65     Other (prediabetes) Mother 65     Hypertension Father Steve Quinnoboda     Depression Father Steve Quinnoboda     Anxiety disorder Father Steve Quinnoboda     Stroke Father Steve Quinnoboda 60    Mental illness Father Steve Quinnoboda     Hypertension Other      Heart disease Other          Social history:  Social History     Socioeconomic History    Marital status: Significant Other     Spouse name: Not on  file    Number of children: Not on file    Years of education: College, Completed Nursing School    Highest education level: Not asked   Occupational History    Occupation: Nurse     Employer: [employer]   Tobacco Use    Smoking status: Never     Passive exposure: Never    Smokeless tobacco: Never   Vaping Use    Vaping status: Former   Substance and Sexual Activity    Alcohol use: Yes     Comment: Once monthly    Drug use: Never    Sexual activity: Yes     Partners: Male     Birth control/protection: None   Other Topics Concern    Not on file   Social History Narrative    Not on file     Social Drivers of Health     Financial Resource Strain: Not on file   Food Insecurity: Not on file   Transportation Needs: Not on file   Physical Activity: Not on file   Stress: Not on file   Social Connections: Not on file   Intimate Partner Violence: Not At Risk (5/16/2024)    Humiliation, Afraid, Rape, and Kick questionnaire     Fear of Current or Ex-Partner: No     Emotionally Abused: No     Physically Abused: No     Sexually Abused: No   Housing Stability: Not on file          Current Outpatient Medications:     aspirin-acetaminophen-caffeine (Excedrin Extra Strength) 250-250-65 mg tablet, Take 2 tablets by mouth once daily., Disp: , Rfl:     chorionic gonadotropin (Pregnyl) 10,000 unit injection, Reconstitute according to instructions and inject 10,000 units (1 mL) under the skin as a one time dose, as directed per provider for trigger., Disp: 1 each, Rfl: 0    chorionic gonadotropin (Pregnyl) 10,000 unit injection, Reconstitute according to instructions and inject 10,000 units (1 mL) under the skin as a one time dose, as directed per provider for trigger., Disp: 1 each, Rfl: 0    diphenhydrAMINE (Sominex) 25 mg tablet, Take 1 tablet (25 mg) by mouth as needed at bedtime for sleep., Disp: , Rfl:     fluticasone (Flonase) 50 mcg/actuation nasal spray, Administer 2 sprays into each nostril once daily at bedtime.  Shake gently. Before first use, prime pump. After use, clean tip and replace cap., Disp: 16 g, Rfl: 2    follitropin swapna (Gonal-f) 900/1.5 unit/mL pen injector pen injection, Inject 300 Units under the skin once daily in the evening as directed by provider, Disp: 3 each, Rfl: 2    ganirelix (Orgalutran) 250 mcg/0.5 mL syringe injection, Inject 0.5 mL (250 mcg) under the skin once daily in the morning as directed per provider., Disp: 5 each, Rfl: 2    loratadine (Claritin) 10 mg tablet, Take 1 tablet (10 mg) by mouth once daily., Disp: , Rfl:     menotropins (Menopur) 75 unit recon soln injection, Inject 75 Units under the skin once daily in the evening as directed by provider, Disp: 10 each, Rfl: 2    norgestimate-ethinyl estradioL (Ortho-Cyclen) 0.25-35 mg-mcg tablet, Take 1 tablet by mouth once daily., Disp: 28 tablet, Rfl: 12    PRENATAL 2-IRON-FOLIC ACID-OM3 ORAL, Prenatal Adult Gummy/DHA/FA, Disp: , Rfl:     ALPRAZolam (Xanax) 0.25 mg tablet, Take 1 tablet PO twice a day as needed for anxiety or for sleep. (Patient not taking: Reported on 11/22/2024), Disp: 10 tablet, Rfl: 0    azelastine (Astelin) 137 mcg (0.1 %) nasal spray, Administer 1 spray into each nostril 2 times a day. Use in each nostril as directed (Patient not taking: Reported on 11/22/2024), Disp: 36 mL, Rfl: 2    letrozole (Femara) 2.5 mg tablet, Take with or without food orally on cycle days 3 through 7 monthly. Hold if positive pregnancy test. (Patient not taking: Reported on 11/22/2024), Disp: 30 tablet, Rfl: 3    leuprolide (Lupron) 1 mg/0.2 mL injection, Using an insulin syringe, draw up 80 units and inject under the skin as a one time dose, as directed per provider for trigger. (Patient not taking: Reported on 11/22/2024), Disp: 1 kit, Rfl: 0    methocarbamol (Robaxin) 500 mg tablet, Take 1 tablet (500 mg) by mouth 3 times a day for 7 days. As needed for pain, Disp: 21 tablet, Rfl: 0    progesterone (Prometrium) 100 mg capsule, Insert 1  "capsule (100 mg) into the vagina 2 times a day. Starting 3 days after IUI (Patient not taking: Reported on 11/22/2024), Disp: 60 capsule, Rfl: 0    progesterone (Prometrium) 200 mg capsule, Take 1 capsule PO every 12 hours on menstrual cycle days 15 through 26. (Patient not taking: Reported on 11/22/2024), Disp: 36 capsule, Rfl: 3         Visit Vitals  /86   Pulse 73   Temp 37.1 °C (98.7 °F)   Resp 16   Ht 1.651 m (5' 5\")   Wt 63 kg (138 lb 14.2 oz)   SpO2 98%   BMI 23.11 kg/m²   OB Status Having periods   Smoking Status Never   BSA 1.7 m²        Review of Systems   Constitutional: Negative.    HENT: Negative.          Root canal 11/22/24   Eyes: Negative.    Respiratory: Negative.     Cardiovascular: Negative.         METS 4  regular exercise    Gastrointestinal: Negative.    Endocrine: Negative.    Genitourinary: Negative.    Musculoskeletal: Negative.    Skin: Negative.    Allergic/Immunologic: Negative.    Neurological: Negative.    Hematological: Negative.    Psychiatric/Behavioral: Negative.          Physical Exam  Vitals reviewed.   Constitutional:       Appearance: Normal appearance.   HENT:      Head: Normocephalic and atraumatic.      Right Ear: External ear normal.      Left Ear: External ear normal.      Nose: Nose normal.      Mouth/Throat:      Pharynx: Oropharynx is clear.   Eyes:      Extraocular Movements: Extraocular movements intact.      Conjunctiva/sclera: Conjunctivae normal.      Pupils: Pupils are equal, round, and reactive to light.   Cardiovascular:      Rate and Rhythm: Normal rate and regular rhythm.      Heart sounds: Normal heart sounds.   Pulmonary:      Effort: Pulmonary effort is normal.      Breath sounds: Normal breath sounds.   Abdominal:      Palpations: Abdomen is soft.   Musculoskeletal:         General: Normal range of motion.      Cervical back: Normal range of motion and neck supple.   Skin:     General: Skin is warm and dry.   Neurological:      General: No focal " deficit present.      Mental Status: She is alert and oriented to person, place, and time.   Psychiatric:         Mood and Affect: Mood normal.         Behavior: Behavior normal.          PAT AIRWAY:   Airway:     Mallampati::  I    Neck ROM::  Full    Lab Results   Component Value Date    WBC 8.8 11/22/2024    HGB 13.9 11/22/2024    HCT 43.0 11/22/2024    MCV 92 11/22/2024     11/22/2024      EKG 4/16/24  Normal sinus rhythm  Normal ECG    P Axis 88 degrees         P Offset 182 ms         IN Interval 144 ms         Q Onset 223 ms         QTC Calculation(Bazett) 425 ms         QTC Fredericia 415 ms         R Axis 80 degrees          T Axis 60 degrees         Ventricular Rate 70 BPM         Atrial Rate 70 BPM         P Onset 151 ms          QRS Count 12 beats         QRS Duration 68 ms         QT Interval 394 ms         T Offset 420 ms              ECHO 1/26/19  Summary     Normal left ventricular systolic function. Estimated ejection  fraction is about 55-60%.   Mild TR.     Normal wall motion seen.  Mild anterior mitral leaflet myxomatous  changes seen.  Trivial mitral    regurgitation seen.  No significant  mitral valve prolapse seen.    RCRI  0  , 3.9 % Risk of MACE    Hematology       Patient instructed to ambulate as soon as possible postoperatively to decrease thromboembolic risk.      Initiate mechanical DVT prophylaxis as soon as possible and initiate chemical prophylaxis when deemed safe from a bleeding standpoint post surgery.       VTE prophylaxis per surgical team       Tests ordered in PAT: cbc  LAB REVIEWED: WNL    Risk assessment complete.  Patient is scheduled for a low surgical risk procedure.        Preoperative medication instructions were provided and reviewed with the patient.  Any additional testing or evaluation was explained to the patient.  Nothing by mouth instructions were discussed and patient's questions were answered prior to conclusion to this encounter.  Patient verbalized  understanding of preoperative instructions given in preadmission testing; discharge instructions available in EMR.    This note was dictated by a speech recognition.  Minor errors may have been detected in a speech recognition.

## 2024-11-22 NOTE — PROGRESS NOTES
CYCLING NOTE - IVF  Cycle #: 1  Reason For Treatment: Male Infertility  Protocol: OCP lead, Antagonist  Menopur 75  Day of stim: 6th day of stim  Patient Hx: 39 year old, AMH = 1.894. Patient of Dr. Morales   Notes: Wants to do a fresh transfer if possible and plan is to transfer 2 untested embryos     Here for US and/or lab monitoring; relevant findings reviewed.    Patient stayed for nurse visit. Pain is 0/10  Team will contact patient later today with results and plan.    Nava Pereira  11/22/2024  7:35 AM    Left voicemail for patient with plan to decrease gonal-f to to 225 and continue all other medications at the same doses.  Will return tomorrow for follicle scan and lab work.   redIT message sent with plan.     Sera Long 11/22/24 1:10 PM

## 2024-11-22 NOTE — PREPROCEDURE INSTRUCTIONS
Medication List            Accurate as of November 22, 2024  2:54 PM. Always use your most recent med list.                ALPRAZolam 0.25 mg tablet  Commonly known as: Xanax  Take 1 tablet PO twice a day as needed for anxiety or for sleep.  Medication Adjustments for Surgery: Take/Use as prescribed     azelastine 137 mcg (0.1 %) nasal spray  Commonly known as: Astelin  Administer 1 spray into each nostril 2 times a day. Use in each nostril as directed  Medication Adjustments for Surgery: Take/Use as prescribed     * chorionic gonadotropin 10,000 unit injection  Commonly known as: Pregnyl  Reconstitute according to instructions and inject 10,000 units (1 mL) under the skin as a one time dose, as directed per provider for trigger.  Notes to patient: Follow IVF clinic instructions      * Pregnyl 10,000 unit injection  Generic drug: chorionic gonadotropin  Reconstitute according to instructions and inject 10,000 units (1 mL) under the skin as a one time dose, as directed per provider for trigger.  Notes to patient: Follow IVF clinic instructions     diphenhydrAMINE 25 mg tablet  Commonly known as: Sominex  Notes to patient: Continue night prior to surgery     Excedrin Extra Strength 250-250-65 mg tablet  Generic drug: aspirin-acetaminophen-caffeine  Notes to patient: HOLD 7 Days prior to surgery      fluticasone 50 mcg/actuation nasal spray  Commonly known as: Flonase  Administer 2 sprays into each nostril once daily at bedtime. Shake gently. Before first use, prime pump. After use, clean tip and replace cap.  Medication Adjustments for Surgery: Take/Use as prescribed     ganirelix 250 mcg/0.5 mL syringe injection  Commonly known as: Orgalutran  Inject 0.5 mL (250 mcg) under the skin once daily in the morning as directed per provider.  Notes to patient: Follow IVF clinic instructions     Gonal-F RFF Redi-Ject 900/1.5 unit/mL pen injector pen injection  Generic drug: follitropin swapna  Inject 300 Units under the skin  once daily in the evening as directed by provider  Notes to patient: Follow IVF clinic instructions      letrozole 2.5 mg tablet  Commonly known as: Femara  Take with or without food orally on cycle days 3 through 7 monthly. Hold if positive pregnancy test.  Notes to patient: Follow IVF clinic instructions     leuprolide 1 mg/0.2 mL injection  Commonly known as: Lupron  Using an insulin syringe, draw up 80 units and inject under the skin as a one time dose, as directed per provider for trigger.  Notes to patient: Follow IVF clinic instructions     loratadine 10 mg tablet  Commonly known as: Claritin  Notes to patient: Use if needed morning of surgery     Menopur 75 unit recon soln injection  Generic drug: menotropins  Inject 75 Units under the skin once daily in the evening as directed by provider  Notes to patient: Follow IVF clinic instructions     methocarbamol 500 mg tablet  Commonly known as: Robaxin  Take 1 tablet (500 mg) by mouth 3 times a day for 7 days. As needed for pain  Medication Adjustments for Surgery: Take/Use as prescribed     norgestimate-ethinyl estradioL 0.25-35 mg-mcg tablet  Commonly known as: Ortho-Cyclen  Take 1 tablet by mouth once daily.  Notes to patient: Follow IVF clinic instructions     PRENATAL 2-IRON-FOLIC ACID-OM3 ORAL  Notes to patient: Follow IVF clinic instructions     * progesterone 200 mg capsule  Commonly known as: Prometrium  Take 1 capsule PO every 12 hours on menstrual cycle days 15 through 26.  Notes to patient: Follow IVF clinic instructions     * progesterone 100 mg capsule  Commonly known as: Prometrium  Insert 1 capsule (100 mg) into the vagina 2 times a day. Starting 3 days after IUI  Notes to patient: Follow IVF clinic instructions           * This list has 4 medication(s) that are the same as other medications prescribed for you. Read the directions carefully, and ask your doctor or other care provider to review them with you.                     Concerning above  medication instructions - If medication is normally taken at night continue normal schedule - do not take night prior and morning of surgery.     CONTACT SURGEON'S OFFICE IF YOU DEVELOP:  * Fever = 100.4 F   * New respiratory symptoms (e.g. cough, shortness of breath, respiratory distress, sore throat)  * Recent loss of taste or smell  *Flu like symptoms such as headache, fatigue or gastrointestinal symptoms  * You develop any open sores, shingles, burning or painful urination   AND/OR:  * You no longer wish to have the surgery.  * Any other personal circumstances change that may lead to the need to cancel or defer this surgery.  *You were admitted to any hospital within one week of your planned procedure.    SMOKING:  *Quitting smoking can make a huge difference to your health and recovery from surgery.    *If you need help with quitting, call 4-138-QUIT-NOW.    THE DAY OF SURGERY:  *Do not eat any food after midnight the night before surgery/procedure.   *YOU MUST DRINK 14 oz drink clear liquids (i.e. water, black coffee/tea, (no milk or cream) apple juice, and electrolyte drinks (Gatorade)  2 hours before your instructed arrival time to the hospital.  *You may chew gum until  2 hours before your surgery/procedure.    SURGICAL TIME  *You will be contacted between 2 p.m. and 6 p.m. the business day before your surgery with your arrival time.  *If you haven't received a call by 6pm, call [phone].  *Scheduled surgery times may change and you will be notified if this occurs-check your personal voicemail for any updates.    ON THE MORNING OF SURGERY:  *Wear comfortable, loose fitting clothing.   *Do not use moisturizers, creams, lotions or perfume.  *All jewelry and valuables should be left at home.  *Prosthetic devices such as contact lenses, hearing aids, dentures, eyelash extensions, hairpins and body piercing must be removed before surgery.    BRING WITH YOU:  *Photo ID and insurance card  *Current list of  medicines and allergies  *Pacemaker/Defibrillator/Heart stent cards  *CPAP machine and mask  *Slings/splints/crutches  *Copy of your complete Advanced Directive/DHPOA-if applicable  *Neurostimulator implant remote    PARKING AND ARRIVAL:  *Check in at the Main Entrance desk and let them know you are here for surgery.  *You will be directed to the 2nd floor surgical waiting area.    AFTER OUTPATIENT SURGERY:  *A responsible adult MUST accompany you at the time of discharge and stay with you for 24 hours after your surgery.  *You may NOT drive yourself home after surgery.  *You may use a taxi or ride sharing service ([employer], [employer]) to return home ONLY if you are accompanied by a friend or family member.  *Instructions for resuming your medications will be provided by your surgeon.

## 2024-11-23 ENCOUNTER — ANCILLARY PROCEDURE (OUTPATIENT)
Dept: ENDOCRINOLOGY | Facility: CLINIC | Age: 39
End: 2024-11-23
Payer: COMMERCIAL

## 2024-11-23 ENCOUNTER — LAB (OUTPATIENT)
Dept: LAB | Facility: LAB | Age: 39
End: 2024-11-23
Payer: COMMERCIAL

## 2024-11-23 DIAGNOSIS — N97.9 FEMALE INFERTILITY: ICD-10-CM

## 2024-11-23 LAB — ESTRADIOL SERPL-MCNC: 1294 PG/ML

## 2024-11-23 PROCEDURE — 82670 ASSAY OF TOTAL ESTRADIOL: CPT

## 2024-11-23 PROCEDURE — 76857 US EXAM PELVIC LIMITED: CPT

## 2024-11-23 PROCEDURE — 36415 COLL VENOUS BLD VENIPUNCTURE: CPT

## 2024-11-23 NOTE — PROGRESS NOTES
CYCLING NOTE    Cycle #: 1  Reason For Treatment: Male Infertility  Protocol: OCP lead, Antagonist  Menopur 75  Day of stim: 7  Patient Hx: 39 year old, AMH = 1.894. Patient of Dr. Morales   Notes: Wants to do a fresh transfer if possible and plan is to transfer 2 untested embryos     Here for US and/or lab monitoring; relevant findings reviewed.    Patient did not stay for discussion after monitoring, tentatively scheduled for Monday.   Team will contact patient later today with results and plan.    Rossi Angulo  11/23/2024  9:45 AM    Plan communicated with the patient, she will increase the FSH from 225 to 275; and she will also increase the HMG from 75 to 150IU.    All questions answered.    Trinity Castillo 11/23/24 11:13 AM

## 2024-11-25 ENCOUNTER — TELEPHONE (OUTPATIENT)
Dept: ENDOCRINOLOGY | Facility: CLINIC | Age: 39
End: 2024-11-25

## 2024-11-25 ENCOUNTER — ANCILLARY PROCEDURE (OUTPATIENT)
Dept: ENDOCRINOLOGY | Facility: CLINIC | Age: 39
End: 2024-11-25
Payer: COMMERCIAL

## 2024-11-25 ENCOUNTER — PHARMACY VISIT (OUTPATIENT)
Dept: PHARMACY | Facility: CLINIC | Age: 39
End: 2024-11-25
Payer: COMMERCIAL

## 2024-11-25 ENCOUNTER — SPECIALTY PHARMACY (OUTPATIENT)
Dept: PHARMACY | Facility: CLINIC | Age: 39
End: 2024-11-25

## 2024-11-25 DIAGNOSIS — N97.9 FEMALE INFERTILITY: ICD-10-CM

## 2024-11-25 LAB
ESTRADIOL SERPL-MCNC: 2671 PG/ML
PROGEST SERPL-MCNC: 1.2 NG/ML

## 2024-11-25 PROCEDURE — 76857 US EXAM PELVIC LIMITED: CPT | Performed by: OBSTETRICS & GYNECOLOGY

## 2024-11-25 PROCEDURE — 82670 ASSAY OF TOTAL ESTRADIOL: CPT

## 2024-11-25 PROCEDURE — RXMED WILLOW AMBULATORY MEDICATION CHARGE

## 2024-11-25 PROCEDURE — 76857 US EXAM PELVIC LIMITED: CPT

## 2024-11-25 PROCEDURE — 84144 ASSAY OF PROGESTERONE: CPT

## 2024-11-25 NOTE — PROGRESS NOTES
CYCLING NOTE - IVF  Cycle #: 1  Reason For Treatment: Male Infertility  Protocol: OCP lead, Antagonist  Menopur 75  Day of stim: 9  Patient Hx: 39 year old, AMH = 1.894. Patient of Dr. Morales   Notes: Wants to do a fresh transfer if possible and plan is to transfer 2 untested embryos   Antagonist started on 11/21    Here for US and/or lab monitoring; relevant findings reviewed.    Patient stayed for nurse visit. Pain is 0/10. Patient aware to call [facility] to get more medications for tonight and tomorrow  Team will contact patient later today with results and plan.    Nava Pereira  11/25/2024  9:58 AM        LM with patient to continue same doses of all medications and return on 11/27 for repeat follicle scan and labs. Patient is already scheduled.     Nava Pereira 11/25/24 1:43 PM

## 2024-11-25 NOTE — TELEPHONE ENCOUNTER
Attempted to call back and speak with Yeny - placed on hold multiple times and unable to reach contact to discuss light duty work.   Nava Pereira 11/25/24 4:36 PM

## 2024-11-25 NOTE — TELEPHONE ENCOUNTER
Reason for call: Yeny Lawson calling to talk to Nava about this patient Lifting restriction she is calling to check on diagnosis and limitations. Please call her at [phone] ask for Yeny  Notes:

## 2024-11-27 ENCOUNTER — ANCILLARY PROCEDURE (OUTPATIENT)
Dept: ENDOCRINOLOGY | Facility: CLINIC | Age: 39
End: 2024-11-27
Payer: COMMERCIAL

## 2024-11-27 ENCOUNTER — PREP FOR PROCEDURE (OUTPATIENT)
Dept: ENDOCRINOLOGY | Facility: CLINIC | Age: 39
End: 2024-11-27

## 2024-11-27 ENCOUNTER — APPOINTMENT (OUTPATIENT)
Dept: ENDOCRINOLOGY | Facility: CLINIC | Age: 39
End: 2024-11-27
Payer: COMMERCIAL

## 2024-11-27 DIAGNOSIS — N97.9 FEMALE INFERTILITY: ICD-10-CM

## 2024-11-27 LAB
ESTRADIOL SERPL-MCNC: 4046 PG/ML
PROGEST SERPL-MCNC: 1.7 NG/ML

## 2024-11-27 PROCEDURE — 76857 US EXAM PELVIC LIMITED: CPT | Performed by: OBSTETRICS & GYNECOLOGY

## 2024-11-27 PROCEDURE — 82670 ASSAY OF TOTAL ESTRADIOL: CPT

## 2024-11-27 PROCEDURE — 84144 ASSAY OF PROGESTERONE: CPT

## 2024-11-27 PROCEDURE — 76857 US EXAM PELVIC LIMITED: CPT

## 2024-11-27 RX ORDER — HYDROMORPHONE HYDROCHLORIDE 0.2 MG/ML
0.2 INJECTION INTRAMUSCULAR; INTRAVENOUS; SUBCUTANEOUS
Status: CANCELLED | OUTPATIENT
Start: 2024-11-27

## 2024-11-27 RX ORDER — HYDROCODONE BITARTRATE AND ACETAMINOPHEN 5; 325 MG/1; MG/1
1 TABLET ORAL ONCE AS NEEDED
Status: CANCELLED | OUTPATIENT
Start: 2024-11-27

## 2024-11-27 RX ORDER — MORPHINE SULFATE 2 MG/ML
2 INJECTION, SOLUTION INTRAMUSCULAR; INTRAVENOUS AS NEEDED
Status: CANCELLED | OUTPATIENT
Start: 2024-11-27

## 2024-11-27 RX ORDER — ONDANSETRON HYDROCHLORIDE 2 MG/ML
4 INJECTION, SOLUTION INTRAVENOUS AS NEEDED
Status: CANCELLED | OUTPATIENT
Start: 2024-11-27

## 2024-11-27 RX ORDER — ACETAMINOPHEN 325 MG/1
650 TABLET ORAL ONCE AS NEEDED
Status: CANCELLED | OUTPATIENT
Start: 2024-11-27

## 2024-11-27 RX ORDER — KETOROLAC TROMETHAMINE 30 MG/ML
30 INJECTION, SOLUTION INTRAMUSCULAR; INTRAVENOUS ONCE
Status: CANCELLED | OUTPATIENT
Start: 2024-11-27 | End: 2024-11-27

## 2024-11-27 RX ORDER — OXYCODONE AND ACETAMINOPHEN 5; 325 MG/1; MG/1
1 TABLET ORAL EVERY 6 HOURS PRN
Status: CANCELLED | OUTPATIENT
Start: 2024-11-27

## 2024-11-27 RX ORDER — CEFAZOLIN SODIUM 2 G/100ML
2 INJECTION, SOLUTION INTRAVENOUS ONCE
Status: CANCELLED | OUTPATIENT
Start: 2024-11-27 | End: 2024-11-27

## 2024-11-27 RX ORDER — KETOROLAC TROMETHAMINE 30 MG/ML
30 INJECTION, SOLUTION INTRAMUSCULAR; INTRAVENOUS ONCE AS NEEDED
Status: CANCELLED | OUTPATIENT
Start: 2024-11-27 | End: 2024-12-02

## 2024-11-27 NOTE — PROGRESS NOTES
CYCLING NOTE - IVF  Cycle #: 1  Reason For Treatment: Male Infertility  Protocol: OCP lead, Antagonist  Menopur 75  Day of stim: 11  Patient Hx: 39 year old, AMH = 1.894. Patient of Dr. Morales   Notes: Wants to do a fresh transfer if possible and plan to transfer 2 untested embryos   Antagonist started on 11/21    Here for US and/or lab monitoring; relevant findings reviewed.    Patient stayed for nurse visit. Pain is 0/10. Trigger shot and retrieval day instructions reviewed with patient.   Team will contact patient later today with results and plan.    Nava Pereira  11/27/2024  7:21 AM          IVF Lupron only trigger    Left message with patient with plan to trigger tonight at exactly 11:45pm with 4mg (80 units/0.8 ML) Leuprolide, no other IVF meds.    Pre-op, post op and trigger instruction hand outs given and reviewed.   NPO after midnight night before procedure; no perfumes, deodorant or lotions.  Arrive 60 minutes prior to procedure at [facility].  Patient will take LH kit tomorrow morning and make sure it is positive (recommended clear blue easy digital with solid smiley face)    Nava Pereira  11/27/2024  2:11 PM

## 2024-11-29 ENCOUNTER — HOSPITAL ENCOUNTER (OUTPATIENT)
Dept: ENDOCRINOLOGY | Facility: CLINIC | Age: 39
Discharge: HOME | End: 2024-11-29
Payer: COMMERCIAL

## 2024-11-29 ENCOUNTER — ANESTHESIA (OUTPATIENT)
Dept: ENDOCRINOLOGY | Facility: CLINIC | Age: 39
End: 2024-11-29
Payer: COMMERCIAL

## 2024-11-29 ENCOUNTER — ANESTHESIA EVENT (OUTPATIENT)
Dept: ENDOCRINOLOGY | Facility: CLINIC | Age: 39
End: 2024-11-29
Payer: COMMERCIAL

## 2024-11-29 VITALS
RESPIRATION RATE: 18 BRPM | HEIGHT: 65 IN | TEMPERATURE: 97.5 F | HEART RATE: 65 BPM | SYSTOLIC BLOOD PRESSURE: 119 MMHG | OXYGEN SATURATION: 100 % | WEIGHT: 137.13 LBS | BODY MASS INDEX: 22.85 KG/M2 | DIASTOLIC BLOOD PRESSURE: 82 MMHG

## 2024-11-29 DIAGNOSIS — Z31.83 ENCOUNTER FOR ASSISTED REPRODUCTIVE FERTILITY CYCLE: ICD-10-CM

## 2024-11-29 PROCEDURE — 89272 EXTENDED CULTURE OF OOCYTES: CPT | Performed by: OBSTETRICS & GYNECOLOGY

## 2024-11-29 PROCEDURE — 2500000004 HC RX 250 GENERAL PHARMACY W/ HCPCS (ALT 636 FOR OP/ED): Performed by: NURSE ANESTHETIST, CERTIFIED REGISTERED

## 2024-11-29 PROCEDURE — 3700000001 HC GENERAL ANESTHESIA TIME - INITIAL BASE CHARGE

## 2024-11-29 PROCEDURE — 89280 ASSIST OOCYTE FERTILIZATION: CPT | Performed by: OBSTETRICS & GYNECOLOGY

## 2024-11-29 PROCEDURE — 58970 RETRIEVAL OF OOCYTE: CPT | Performed by: OBSTETRICS & GYNECOLOGY

## 2024-11-29 PROCEDURE — 3700000002 HC GENERAL ANESTHESIA TIME - EACH INCREMENTAL 1 MINUTE

## 2024-11-29 PROCEDURE — 76948 ECHO GUIDE OVA ASPIRATION: CPT | Performed by: OBSTETRICS & GYNECOLOGY

## 2024-11-29 PROCEDURE — 89258 CRYOPRESERVATION EMBRYO(S): CPT | Performed by: OBSTETRICS & GYNECOLOGY

## 2024-11-29 PROCEDURE — A58970 PR FOLLICLE PUNC,RETRIEVAL OF OOCYTE: Performed by: NURSE ANESTHETIST, CERTIFIED REGISTERED

## 2024-11-29 PROCEDURE — 7100000009 HC PHASE TWO TIME - INITIAL BASE CHARGE

## 2024-11-29 PROCEDURE — A58970 PR FOLLICLE PUNC,RETRIEVAL OF OOCYTE: Performed by: STUDENT IN AN ORGANIZED HEALTH CARE EDUCATION/TRAINING PROGRAM

## 2024-11-29 PROCEDURE — 7100000010 HC PHASE TWO TIME - EACH INCREMENTAL 1 MINUTE

## 2024-11-29 PROCEDURE — 89261 SPERM ISOLATION COMPLEX: CPT | Performed by: OBSTETRICS & GYNECOLOGY

## 2024-11-29 PROCEDURE — 89250 CULTR OOCYTE/EMBRYO <4 DAYS: CPT | Performed by: OBSTETRICS & GYNECOLOGY

## 2024-11-29 RX ORDER — KETOROLAC TROMETHAMINE 30 MG/ML
30 INJECTION, SOLUTION INTRAMUSCULAR; INTRAVENOUS ONCE AS NEEDED
Status: DISCONTINUED | OUTPATIENT
Start: 2024-11-29 | End: 2024-11-30 | Stop reason: HOSPADM

## 2024-11-29 RX ORDER — KETOROLAC TROMETHAMINE 30 MG/ML
INJECTION, SOLUTION INTRAMUSCULAR; INTRAVENOUS AS NEEDED
Status: DISCONTINUED | OUTPATIENT
Start: 2024-11-29 | End: 2024-11-29

## 2024-11-29 RX ORDER — KETOROLAC TROMETHAMINE 30 MG/ML
30 INJECTION, SOLUTION INTRAMUSCULAR; INTRAVENOUS ONCE
Status: DISCONTINUED | OUTPATIENT
Start: 2024-11-29 | End: 2024-11-30 | Stop reason: HOSPADM

## 2024-11-29 RX ORDER — ONDANSETRON HYDROCHLORIDE 2 MG/ML
4 INJECTION, SOLUTION INTRAVENOUS AS NEEDED
Status: DISCONTINUED | OUTPATIENT
Start: 2024-11-29 | End: 2024-11-30 | Stop reason: HOSPADM

## 2024-11-29 RX ORDER — MIDAZOLAM HYDROCHLORIDE 1 MG/ML
INJECTION, SOLUTION INTRAMUSCULAR; INTRAVENOUS AS NEEDED
Status: DISCONTINUED | OUTPATIENT
Start: 2024-11-29 | End: 2024-11-29

## 2024-11-29 RX ORDER — SODIUM CHLORIDE, SODIUM LACTATE, POTASSIUM CHLORIDE, CALCIUM CHLORIDE 600; 310; 30; 20 MG/100ML; MG/100ML; MG/100ML; MG/100ML
INJECTION, SOLUTION INTRAVENOUS CONTINUOUS PRN
Status: DISCONTINUED | OUTPATIENT
Start: 2024-11-29 | End: 2024-11-29

## 2024-11-29 RX ORDER — HYDROCODONE BITARTRATE AND ACETAMINOPHEN 5; 325 MG/1; MG/1
1 TABLET ORAL ONCE AS NEEDED
Status: DISCONTINUED | OUTPATIENT
Start: 2024-11-29 | End: 2024-11-30 | Stop reason: HOSPADM

## 2024-11-29 RX ORDER — CEFAZOLIN SODIUM 2 G/50ML
2 SOLUTION INTRAVENOUS ONCE
Status: DISCONTINUED | OUTPATIENT
Start: 2024-11-29 | End: 2024-11-30 | Stop reason: HOSPADM

## 2024-11-29 RX ORDER — MORPHINE SULFATE 2 MG/ML
2 INJECTION, SOLUTION INTRAMUSCULAR; INTRAVENOUS AS NEEDED
Status: DISCONTINUED | OUTPATIENT
Start: 2024-11-29 | End: 2024-11-30 | Stop reason: HOSPADM

## 2024-11-29 RX ORDER — CEFAZOLIN 1 G/1
INJECTION, POWDER, FOR SOLUTION INTRAVENOUS AS NEEDED
Status: DISCONTINUED | OUTPATIENT
Start: 2024-11-29 | End: 2024-11-29

## 2024-11-29 RX ORDER — PROPOFOL 10 MG/ML
INJECTION, EMULSION INTRAVENOUS AS NEEDED
Status: DISCONTINUED | OUTPATIENT
Start: 2024-11-29 | End: 2024-11-29

## 2024-11-29 RX ORDER — FENTANYL CITRATE 50 UG/ML
INJECTION, SOLUTION INTRAMUSCULAR; INTRAVENOUS AS NEEDED
Status: DISCONTINUED | OUTPATIENT
Start: 2024-11-29 | End: 2024-11-29

## 2024-11-29 RX ORDER — ACETAMINOPHEN 325 MG/1
650 TABLET ORAL ONCE AS NEEDED
Status: DISCONTINUED | OUTPATIENT
Start: 2024-11-29 | End: 2024-11-30 | Stop reason: HOSPADM

## 2024-11-29 RX ORDER — OXYCODONE AND ACETAMINOPHEN 5; 325 MG/1; MG/1
1 TABLET ORAL EVERY 6 HOURS PRN
Status: DISCONTINUED | OUTPATIENT
Start: 2024-11-29 | End: 2024-11-30 | Stop reason: HOSPADM

## 2024-11-29 RX ORDER — HYDROMORPHONE HYDROCHLORIDE 2 MG/ML
0.2 INJECTION, SOLUTION INTRAMUSCULAR; INTRAVENOUS; SUBCUTANEOUS
Status: DISCONTINUED | OUTPATIENT
Start: 2024-11-29 | End: 2024-11-30 | Stop reason: HOSPADM

## 2024-11-29 ASSESSMENT — PATIENT HEALTH QUESTIONNAIRE - PHQ9
1. LITTLE INTEREST OR PLEASURE IN DOING THINGS: NOT AT ALL
10. IF YOU CHECKED OFF ANY PROBLEMS, HOW DIFFICULT HAVE THESE PROBLEMS MADE IT FOR YOU TO DO YOUR WORK, TAKE CARE OF THINGS AT HOME, OR GET ALONG WITH OTHER PEOPLE: NOT DIFFICULT AT ALL
SUM OF ALL RESPONSES TO PHQ9 QUESTIONS 1 & 2: 1
2. FEELING DOWN, DEPRESSED OR HOPELESS: SEVERAL DAYS

## 2024-11-29 ASSESSMENT — PAIN SCALES - GENERAL
PAINLEVEL_OUTOF10: 4
PAINLEVEL_OUTOF10: 0 - NO PAIN

## 2024-11-29 ASSESSMENT — PAIN - FUNCTIONAL ASSESSMENT
PAIN_FUNCTIONAL_ASSESSMENT: 0-10

## 2024-11-29 NOTE — PROGRESS NOTES
Patient ID: Taty Sosa is a 39 y.o. female.    Egg Retrieval    Date/Time: 11/29/2024 12:04 PM    Performed by: Sy Mendoza MD  Authorized by: Tiffany Morales MD    Consent:     Consent obtained:  Verbal and written    Consent given by:  Patient    Procedure risks and benefits discussed: yes      Patient questions answered: yes      Patient agrees, verbalizes understanding, and wants to proceed: yes      Educational handouts given: yes      Instructions and paperwork completed: yes    Procedure:     Anesthesia:  MAC    Pelvic exam performed: Yes      Cervix cleaned and prepped: Yes      Speculum placed in vagina: Yes      Tenaculum applied to cervix: No      Ultrasound guidance: Yes      Left ovary:  Follicle present    Right ovary:  Follicle present    Pt. post-ovulatory: No      Speculum type: Rambo      Retrieval method: transvaginal      Needle inserted: Yes      Ovaries aspirated: Yes      Free fluid in pelvis: No    Post-procedure:     Patient tolerated procedure well: yes    Comments:      Preop diagnosis: Female infertility   Post op diagnosis: Same  Assistant: Dr. Sanders    IV Fluids: 300  cc  EBL: 5  cc  UOP: Not recorded    Specimen: Oocytes  Complications: None    Total Number of Oocytes Retrieved: 10   Ovarian access (right): Easy  Ovarian access (left): Easy  Endometrial thickness: n/a  Needle type: Single  Additional notes:

## 2024-11-29 NOTE — ANESTHESIA PREPROCEDURE EVALUATION
Patient: Taty Sosa    Procedure Information       Date/Time: 11/29/24 1145    Scheduled providers: Sy Mendoza MD; CHAYO Becker-SVEN; Jose Angel Pruitt MD    Procedure: EGG RETRIEVAL    [facility]: [facility]; [facility]            Relevant Problems   Cardiac   (+) Angina pectoris   (+) Pleuritic chest pain   (+) Ventricular premature contractions      Neuro   (+) Anxiety   (+) Anxiety with depression   (+) Bipolar disorder   (+) Depression      GI   (+) Dysphagia   (+) Gastroesophageal reflux disease with hiatal hernia      HEENT   (+) Seasonal allergies       Clinical information reviewed:   Tobacco  Allergies  Meds   Med Hx  Surg Hx  OB Status  Fam Hx  Soc   Hx        NPO Detail:  NPO/Void Status  Date of Last Liquid: 11/28/24  Time of Last Liquid: 2100  Date of Last Solid: 11/28/24  Time of Last Solid: 2100         Physical Exam    Airway  Mallampati: II  TM distance: >3 FB  Neck ROM: full     Cardiovascular   Rhythm: regular  Rate: normal     Dental    Pulmonary   Breath sounds clear to auscultation     Abdominal            Anesthesia Plan    History of general anesthesia?: yes  History of complications of general anesthesia?: no    ASA 2     MAC     intravenous induction   Anesthetic plan and risks discussed with patient.    Plan discussed with CRNA.

## 2024-11-29 NOTE — NURSING NOTE
Patient discharged to home in stable condition via wheelchair to [facility]IDE HOME: Partner's car. Discharge instructions given and concerns addressed.   Alma Delia Alexander RN 11/29/24 1:44 PM

## 2024-11-29 NOTE — PROGRESS NOTES
IVF Procedure Area H&P    Ms. Taty Sosa is a 39 y.o. F who presents for egg retrieval under anesthesia.   Interval history reviewed and affirmed as up to date.      MedHx:   Past Medical History:   Diagnosis Date    Anxiety     Asthma     exercise induced    Depression     GERD (gastroesophageal reflux disease)     symptomatic    Inappropriate sinus node tachycardia (CMS-HCC)     Migraine     CHUCK on CPAP     Palpitations     Pericarditis (HHS-HCC)     15 years ago after wisdom tooth extraction    PTSD (post-traumatic stress disorder)     Restless leg syndrome     Vitamin D deficiency        SurgHx:   Past Surgical History:   Procedure Laterality Date    HYSTEROSCOPY  10/31/2024    LAPAROSCOPIC NISSEN FUNDOPLICATION N/A 08/07/2017    OTHER SURGICAL HISTORY  01/12/2016    EP Study    WISDOM TOOTH EXTRACTION         Meds:   Current Outpatient Medications on File Prior to Encounter   Medication Sig Dispense Refill    aspirin-acetaminophen-caffeine (Excedrin Extra Strength) 250-250-65 mg tablet Take 2 tablets by mouth once daily.      diphenhydrAMINE (Sominex) 25 mg tablet Take 1 tablet (25 mg) by mouth as needed at bedtime for sleep.      loratadine (Claritin) 10 mg tablet Take 1 tablet (10 mg) by mouth once daily.      PRENATAL 2-IRON-FOLIC ACID-OM3 ORAL Prenatal Adult Gummy/DHA/FA      ALPRAZolam (Xanax) 0.25 mg tablet Take 1 tablet PO twice a day as needed for anxiety or for sleep. (Patient not taking: Reported on 10/31/2024) 10 tablet 0    azelastine (Astelin) 137 mcg (0.1 %) nasal spray Administer 1 spray into each nostril 2 times a day. Use in each nostril as directed (Patient not taking: Reported on 10/31/2024) 36 mL 2    methocarbamol (Robaxin) 500 mg tablet Take 1 tablet (500 mg) by mouth 3 times a day for 7 days. As needed for pain 21 tablet 0    [DISCONTINUED] chorionic gonadotropin (Pregnyl) 10,000 unit injection Reconstitute according to instructions and inject 10,000 units (1 mL) under the skin  as a one time dose, as directed per provider for trigger. 1 each 0    [DISCONTINUED] chorionic gonadotropin (Pregnyl) 10,000 unit injection Reconstitute according to instructions and inject 10,000 units (1 mL) under the skin as a one time dose, as directed per provider for trigger. 1 each 0    [DISCONTINUED] fluticasone (Flonase) 50 mcg/actuation nasal spray Administer 2 sprays into each nostril once daily at bedtime. Shake gently. Before first use, prime pump. After use, clean tip and replace cap. 16 g 2    [DISCONTINUED] follitropin swapna (Gonal-f) 900/1.5 unit/mL pen injector pen injection Inject 300 Units under the skin once daily in the evening as directed by provider 3 each 2    [DISCONTINUED] ganirelix (Orgalutran) 250 mcg/0.5 mL syringe injection Inject 0.5 mL (250 mcg) under the skin once daily in the morning as directed per provider. 5 each 2    [DISCONTINUED] letrozole (Femara) 2.5 mg tablet Take with or without food orally on cycle days 3 through 7 monthly. Hold if positive pregnancy test. (Patient not taking: Reported on 11/22/2024) 30 tablet 3    [DISCONTINUED] leuprolide (Lupron) 1 mg/0.2 mL injection Using an insulin syringe, draw up 80 units and inject under the skin as a one time dose, as directed per provider for trigger. (Patient not taking: Reported on 11/22/2024) 1 kit 0    [DISCONTINUED] menotropins (Menopur) 75 unit recon soln injection Inject 75 Units under the skin once daily in the evening as directed by provider 10 each 2    [DISCONTINUED] norgestimate-ethinyl estradioL (Ortho-Cyclen) 0.25-35 mg-mcg tablet Take 1 tablet by mouth once daily. 28 tablet 12    [DISCONTINUED] progesterone (Prometrium) 100 mg capsule Insert 1 capsule (100 mg) into the vagina 2 times a day. Starting 3 days after IUI (Patient not taking: Reported on 11/22/2024) 60 capsule 0    [DISCONTINUED] progesterone (Prometrium) 200 mg capsule Take 1 capsule PO every 12 hours on menstrual cycle days 15 through 26. (Patient  not taking: Reported on 11/22/2024) 36 capsule 3    [DISCONTINUED] progesterone vaginal suppository 200 mg Insert 1 suppository (200 mg) into the vagina 2 times a day. Pharmacy to compound 60 suppository 0     No current facility-administered medications on file prior to encounter.       Allergies:   Allergies   Allergen Reactions    Procaine Palpitations    Adhesive Rash    Adhesive Tape-Silicones Rash and Other     Redness - Irritation       ROS: negative apart from what is indicated above    PE:   Gen: AA x 3   Resp: No labored breathing  Abdomen: soft, non-tender, non-distended    A/P: Ms. Taty Sosa is a 39 y.o. F who presents for above procedure under anesthesia in the IVF procedure area. Okay to proceed with above stated procedure.    Irma Sanders

## 2024-11-29 NOTE — DISCHARGE INSTRUCTIONS
[facility]  [address]. [place]  [address]   Tel: [phone]   Fax: [phone]    Home Going Instructions after Egg Retrieval:     Activity:   We do not recommend exercise on the day of or the day after your egg retrieval.   You can resume normal activities in 2-3 days, but please avoid exercise that involves jumping or bouncing.  If you are not having a fresh embryo transfer, you will likely start your period within 1-2 weeks and can resume all normal exercise at that time.   You may resume intercourse one week after your retrieval.     Anesthesia:  Drink small amounts of liquids initially and then slowly increase your intake of food on the day of your procedure. Drinking fluids will keep your bowels regular.   Avoid foods that are sweet, spicy or hard to digest today.  If you feel nauseated, rest your stomach for one hour, and then try drinking clear liquids again.  You may take a stool softener or miralax/milk of magnesia to help with constipation that may occur after anesthesia.  Please make sure a responsible adult is with you for at least 24 hours after surgery and do not drive or make important decisions during this time. Anesthesia may affect your judgment, coordination, and reaction time.    Follow up:  ALWAYS follow up with your primary nurse a few days after your procedure to check in and make sure you know what your next steps are.     When to call your provider:  Vaginal bleeding that is more than a normal period that doesn't taper down within 6 hours.  Saturating a sanitary pad in 1-2 hours.  Any clots the size of an egg or bigger.  Fever of 100.4 or higher with chills.  Unusual or foul smelling discharge.  Discomfort from abdominal distension.     Notify your Physician's office if you develop any signs of Ovarian Hyperstimulation (OHSS):    -Abdominal bloating   -Rapid weight gain of 5-10 lbs. in 1-2 days  -Swelling in  hands and feet  -Severe abdominal pain  -Shortness of breath   -Difficulty urinating or decreased urinary output   -Diarrhea    -Persistent nausea and vomiting  -Dizziness     These signs and symptoms can occur for up to 2 weeks following your oocyte retrieval. Call [phone] to speak with a Physician or Nurse if you have any concerns.    Gloria Argueta    11:15 AM    [facility]  [address]. [place], [place]     IVF LAB EMBRYO UPDATE PROTOCOL    The IVF Lab will call on the following days:    Retrieval Day: The doctor performing the procedure will tell you the total number of eggs collected. There will be no update from the IVF Lab on retrieval day.   Day 1: The IVF Lab will call you between 9:00-11:00 with an egg fertilization update.    Day 6: The IVF Lab will call you for an update on how your embryos have developed and how many have reached the blastocyst stage.    Day 6/7: You will receive an email from the IVF Lab with your summary report indicating the number of embryos that were able to be frozen. If you are doing genetic testing on your embryos you will receive a phone call regarding the number of embryos biopsied and frozen.    Gloria Argueta    11:15 AM    [facility]  [address] [place]  [address]  Tel: [phone]   Fax: [phone]    Frozen Embryo Transfer Instructions    After your egg retrieval, follow up with your IVF nurse within 3-4 days Monday-Friday regarding the plan for your frozen embryo transfer (FET) cycle. Your IVF nurse will order and review with you the medications you will be using for the cycle.     You will be sent an email from [facility] to fill out your Frozen Embryo Treatment Plan at this time. This must be completed by the day you call the office with your period to set up the transfer cycle. If you do not have this paperwork  completed when you call for cycle set up, you may need to take a birth control pill, if appropriate, or wait until your following period. Completion of this paperwork is what allows us to call your embryos back from offsite storage for embryo transfer.    Please call the office on the first full flow day of your period to speak with your IVF nurse.    If the first day of your cycle begins over the weekend, please call the office Monday morning.   Depending on our embryo transfer schedule and the medication protocol your doctor prescribes, you may not start your embryo transfer cycle medications immediately after your period starts. If appropriate for you, your doctor may have you start birth control to help time your embryo transfer cycle which means there may be a short delay in starting your FET cycle.   If you did PGT testing of your embryos, you will not start a frozen embryo transfer cycle until we have received your results. It can take up to a few weeks to receive these results. If appropriate, we may have you take birth control from the time your period starts until the results are ready.      You will be set up for a lining check ultrasound and labs mid cycle, and given a tentative embryo transfer date. You may require multiple lining checks at the discretion of your provider. After your provider determines your lining is adequate, we will determine what date you will begin your progesterone support and confirm the day of your embryo transfer.     The embryology lab will contact you the day before with the time of your embryo transfer and when to arrive.     Please make sure to drink 20oz. of water one hour before your scheduled arrival time.   You do not need to be on bedrest following your embryo transfer. You may resume normal activity following embryo transfer.   You will have your blood drawn on the day of transfer to check a progesterone level and you will receive a call that afternoon with your  results.   Do not discontinue any of your medications unless instructed to by your provider.     Gloria Argueta RN    11:15 AM

## 2024-12-02 NOTE — ANESTHESIA POSTPROCEDURE EVALUATION
Patient: Taty Sosa    Procedure Summary       Date: 11/29/24 Room / Location: [facility]; [facility]    Anesthesia Start: 1140 Anesthesia Stop: 1216    Procedure: EGG RETRIEVAL Diagnosis: Encounter for assisted reproductive fertility cycle    Scheduled Providers: Sy Mendoza MD; GREG Becker; Jose Angel Pruitt MD Responsible Provider: Jose Angel Pruitt MD    Anesthesia Type: MAC ASA Status: 2            Anesthesia Type: MAC    Vitals Value Taken Time   /82 11/29/24 1243   Temp 36.4 °C (97.5 °F) 11/29/24 1213   Pulse 65 11/29/24 1243   Resp 18 11/29/24 1243   SpO2 100 % 11/29/24 1243       Anesthesia Post Evaluation    Patient location during evaluation: bedside  Patient participation: complete - patient participated  Level of consciousness: awake  Pain management: adequate  Multimodal analgesia pain management approach  Airway patency: patent  Cardiovascular status: stable  Respiratory status: spontaneous ventilation and unassisted  Hydration status: acceptable  Postoperative Nausea and Vomiting: none  Comments: No significant PONV.        No notable events documented.

## 2024-12-04 ENCOUNTER — APPOINTMENT (OUTPATIENT)
Dept: SLEEP MEDICINE | Facility: CLINIC | Age: 39
End: 2024-12-04
Payer: COMMERCIAL

## 2024-12-05 ENCOUNTER — APPOINTMENT (OUTPATIENT)
Dept: SLEEP MEDICINE | Facility: CLINIC | Age: 39
End: 2024-12-05
Payer: COMMERCIAL

## 2024-12-05 ENCOUNTER — SPECIALTY PHARMACY (OUTPATIENT)
Dept: PHARMACY | Facility: CLINIC | Age: 39
End: 2024-12-05

## 2024-12-05 NOTE — PROGRESS NOTES
[facility] PATIENT REASSESSMENT AND END OF THERAPY NOTE- FERTILITY    [facility] Supplied Medications:    Gonal-f 900 unit Redi-Ject pen, Menopur 75 unit vials, Ganirelix 250mcg syringes, Pregnyl 10,000IU vial for trigger , and Leuprolide 1mg/0.2mL kit for trigger    Treatment Start Date: 11/17/24  Treatment End Date: 11/27/24      IVF Meeting/Reassessment Tracking:     First Fill Assessment Completed: Yes - abbreviated  New IVF Cycle- cycle 1, antagonist protocol, start medications 11/17/24     IVF Baseline Starting Doses:   Gonal-F 300 units subcutaneous daily  Menopur 75 units subcutaneous daily  Monitoring again 11/20/24     IVF Meeting 11/20/24:    Decrease Gonal-F to 275 units subcutaneous daily  Continue Menopur 75 units subcutaneous daily  Start Ganirelix 250mcg subcutaneous daily 11/21  Monitoring again 11/22/24     IVF Meeting 11/22/24:    Decrease Gonal-F to 225 units subcutaneous daily  Continue Menopur 75 units subcutaneous daily  Continue Ganirelix 250mcg subcutaneous daily   Monitoring again 11/23/24     IVF Meeting 11/23/24:    Increase Gonal-F to 275 units subcutaneous daily  Increase Menoputo 150 units subcutaneous daily  Continue Ganirelix 250mcg subcutaneous daily   Monitoring again 11/25/24     IVF Meeting 11/25/24:    Continue Gonal-F  275 units subcutaneous daily  Continue Menopur 150 units subcutaneous daily  Continue Ganirelix 250mcg subcutaneous daily   Monitoring again 11/27/24     IVF Meeting 11/25/24:    Trigger tonight with Lupron 80 units for egg retrieval 11/29/24     TOLERANCE:   Have you experienced any side effects from this medication? No  Are there any changes to current therapy regimen? Yes- dose changes made during IVF stimulation cycle.    EFFICACY:   Have you developed any new symptoms of your condition? No  How do you feel your medication is affecting your disease state? 10 oocytes retrieved    GOALS:  Your goals of therapy are:   Successful  administration of IVF Medication(s)  Stimulate ovaries to produce multiple eggs for retrieval (Gonal-F, Follistim, Menopur, Low Dose HCG)  Suppress ovaries to prevent premature ovulation (Ganirelix/Cetrotide, Microdose & Long Lupron)  Trigger ovulation to prepare for egg retrieval (Lupron/Pregnyl)    All goals achieved through medication therapy.     COMPLIANCE / ADHERENCE:  Have you had any unplanned missed doses? No  If yes, how often do you miss doses and is there a particular contributing reason? N/a    GENERAL ASSESSMENT:  Are there any changes to your home medications, OTCs or supplements? No  Do you have any new allergies? No  Have you been diagnosed with any new medical conditions? No  Do you have any remaining fertility medications? Yes - reviewed proper storage of extra medications    IMPRESSION/PLAN:  Is patient high risk? No  Is a clinical intervention needed? No  Additional comments: Patient has  Specialty Pharmacy contact information. Patient was encouraged to reach out for additional questions or concerns.      Adelaide Lund (Katie), PharmOMID  [facility]  Clinical Pharmacy Specialist- Fertility   [facility], [place]  [address] [place] [address]  Email: [email or web address]  Tel: [phone]       Fax: [phone]

## 2024-12-06 ENCOUNTER — APPOINTMENT (OUTPATIENT)
Dept: RADIOLOGY | Facility: HOSPITAL | Age: 39
End: 2024-12-06
Payer: COMMERCIAL

## 2024-12-06 ENCOUNTER — LAB (OUTPATIENT)
Dept: LAB | Facility: LAB | Age: 39
End: 2024-12-06
Payer: COMMERCIAL

## 2024-12-06 ENCOUNTER — HOSPITAL ENCOUNTER (EMERGENCY)
Facility: HOSPITAL | Age: 39
Discharge: HOME | End: 2024-12-06
Attending: STUDENT IN AN ORGANIZED HEALTH CARE EDUCATION/TRAINING PROGRAM
Payer: COMMERCIAL

## 2024-12-06 ENCOUNTER — OFFICE VISIT (OUTPATIENT)
Dept: GASTROENTEROLOGY | Facility: CLINIC | Age: 39
End: 2024-12-06
Payer: COMMERCIAL

## 2024-12-06 VITALS
OXYGEN SATURATION: 98 % | DIASTOLIC BLOOD PRESSURE: 82 MMHG | HEIGHT: 64 IN | HEART RATE: 57 BPM | RESPIRATION RATE: 16 BRPM | SYSTOLIC BLOOD PRESSURE: 137 MMHG | BODY MASS INDEX: 23.39 KG/M2 | WEIGHT: 137 LBS | TEMPERATURE: 97.7 F

## 2024-12-06 VITALS
WEIGHT: 143 LBS | BODY MASS INDEX: 23.82 KG/M2 | TEMPERATURE: 97.7 F | DIASTOLIC BLOOD PRESSURE: 79 MMHG | HEART RATE: 68 BPM | SYSTOLIC BLOOD PRESSURE: 144 MMHG | HEIGHT: 65 IN

## 2024-12-06 DIAGNOSIS — K52.9 COLITIS: ICD-10-CM

## 2024-12-06 DIAGNOSIS — K21.9 GASTROESOPHAGEAL REFLUX DISEASE, UNSPECIFIED WHETHER ESOPHAGITIS PRESENT: ICD-10-CM

## 2024-12-06 DIAGNOSIS — K52.9 COLITIS: Primary | ICD-10-CM

## 2024-12-06 DIAGNOSIS — R19.7 BLOODY DIARRHEA: ICD-10-CM

## 2024-12-06 DIAGNOSIS — K92.1 BLOODY STOOLS: Primary | ICD-10-CM

## 2024-12-06 DIAGNOSIS — R13.19 ESOPHAGEAL DYSPHAGIA: ICD-10-CM

## 2024-12-06 DIAGNOSIS — K92.1 BLOODY STOOLS: ICD-10-CM

## 2024-12-06 DIAGNOSIS — R10.32 LLQ ABDOMINAL PAIN: ICD-10-CM

## 2024-12-06 LAB
ALBUMIN SERPL BCP-MCNC: 4.2 G/DL (ref 3.4–5)
ALP SERPL-CCNC: 50 U/L (ref 33–110)
ALT SERPL W P-5'-P-CCNC: 15 U/L (ref 7–45)
ANION GAP SERPL CALCULATED.3IONS-SCNC: 9 MMOL/L (ref 10–20)
AST SERPL W P-5'-P-CCNC: 22 U/L (ref 9–39)
B-HCG SERPL-ACNC: <2 MIU/ML
BASOPHILS # BLD AUTO: 0.04 X10*3/UL (ref 0–0.1)
BASOPHILS NFR BLD AUTO: 0.3 %
BILIRUB SERPL-MCNC: 0.2 MG/DL (ref 0–1.2)
BUN SERPL-MCNC: 12 MG/DL (ref 6–23)
CALCIUM SERPL-MCNC: 9.1 MG/DL (ref 8.6–10.3)
CHLORIDE SERPL-SCNC: 106 MMOL/L (ref 98–107)
CO2 SERPL-SCNC: 25 MMOL/L (ref 21–32)
CREAT SERPL-MCNC: 1.04 MG/DL (ref 0.5–1.05)
EGFRCR SERPLBLD CKD-EPI 2021: 71 ML/MIN/1.73M*2
EOSINOPHIL # BLD AUTO: 0.05 X10*3/UL (ref 0–0.7)
EOSINOPHIL NFR BLD AUTO: 0.4 %
ERYTHROCYTE [DISTWIDTH] IN BLOOD BY AUTOMATED COUNT: 12 % (ref 11.5–14.5)
GLUCOSE SERPL-MCNC: 110 MG/DL (ref 74–99)
HCT VFR BLD AUTO: 40.4 % (ref 36–46)
HGB BLD-MCNC: 13.8 G/DL (ref 12–16)
IMM GRANULOCYTES # BLD AUTO: 0.05 X10*3/UL (ref 0–0.7)
IMM GRANULOCYTES NFR BLD AUTO: 0.4 % (ref 0–0.9)
LIPASE SERPL-CCNC: 38 U/L (ref 9–82)
LYMPHOCYTES # BLD AUTO: 1.25 X10*3/UL (ref 1.2–4.8)
LYMPHOCYTES NFR BLD AUTO: 9.2 %
MCH RBC QN AUTO: 31.4 PG (ref 26–34)
MCHC RBC AUTO-ENTMCNC: 34.2 G/DL (ref 32–36)
MCV RBC AUTO: 92 FL (ref 80–100)
MONOCYTES # BLD AUTO: 0.43 X10*3/UL (ref 0.1–1)
MONOCYTES NFR BLD AUTO: 3.2 %
NEUTROPHILS # BLD AUTO: 11.71 X10*3/UL (ref 1.2–7.7)
NEUTROPHILS NFR BLD AUTO: 86.5 %
NRBC BLD-RTO: 0 /100 WBCS (ref 0–0)
PLATELET # BLD AUTO: 246 X10*3/UL (ref 150–450)
POTASSIUM SERPL-SCNC: 4.3 MMOL/L (ref 3.5–5.3)
PROT SERPL-MCNC: 7 G/DL (ref 6.4–8.2)
RBC # BLD AUTO: 4.4 X10*6/UL (ref 4–5.2)
SODIUM SERPL-SCNC: 136 MMOL/L (ref 136–145)
WBC # BLD AUTO: 13.5 X10*3/UL (ref 4.4–11.3)

## 2024-12-06 PROCEDURE — 85025 COMPLETE CBC W/AUTO DIFF WBC: CPT | Performed by: STUDENT IN AN ORGANIZED HEALTH CARE EDUCATION/TRAINING PROGRAM

## 2024-12-06 PROCEDURE — 87329 GIARDIA AG IA: CPT

## 2024-12-06 PROCEDURE — 99214 OFFICE O/P EST MOD 30 MIN: CPT | Performed by: NURSE PRACTITIONER

## 2024-12-06 PROCEDURE — 2550000001 HC RX 255 CONTRASTS: Performed by: STUDENT IN AN ORGANIZED HEALTH CARE EDUCATION/TRAINING PROGRAM

## 2024-12-06 PROCEDURE — 96374 THER/PROPH/DIAG INJ IV PUSH: CPT | Mod: 59

## 2024-12-06 PROCEDURE — 2500000001 HC RX 250 WO HCPCS SELF ADMINISTERED DRUGS (ALT 637 FOR MEDICARE OP): Performed by: STUDENT IN AN ORGANIZED HEALTH CARE EDUCATION/TRAINING PROGRAM

## 2024-12-06 PROCEDURE — 74177 CT ABD & PELVIS W/CONTRAST: CPT | Mod: FOREIGN READ | Performed by: RADIOLOGY

## 2024-12-06 PROCEDURE — 80053 COMPREHEN METABOLIC PANEL: CPT | Performed by: STUDENT IN AN ORGANIZED HEALTH CARE EDUCATION/TRAINING PROGRAM

## 2024-12-06 PROCEDURE — 83690 ASSAY OF LIPASE: CPT | Performed by: STUDENT IN AN ORGANIZED HEALTH CARE EDUCATION/TRAINING PROGRAM

## 2024-12-06 PROCEDURE — 3008F BODY MASS INDEX DOCD: CPT | Performed by: NURSE PRACTITIONER

## 2024-12-06 PROCEDURE — 2500000004 HC RX 250 GENERAL PHARMACY W/ HCPCS (ALT 636 FOR OP/ED): Performed by: STUDENT IN AN ORGANIZED HEALTH CARE EDUCATION/TRAINING PROGRAM

## 2024-12-06 PROCEDURE — 87506 IADNA-DNA/RNA PROBE TQ 6-11: CPT

## 2024-12-06 PROCEDURE — 36415 COLL VENOUS BLD VENIPUNCTURE: CPT | Performed by: STUDENT IN AN ORGANIZED HEALTH CARE EDUCATION/TRAINING PROGRAM

## 2024-12-06 PROCEDURE — 87328 CRYPTOSPORIDIUM AG IA: CPT

## 2024-12-06 PROCEDURE — 99285 EMERGENCY DEPT VISIT HI MDM: CPT | Mod: 25 | Performed by: STUDENT IN AN ORGANIZED HEALTH CARE EDUCATION/TRAINING PROGRAM

## 2024-12-06 PROCEDURE — 74177 CT ABD & PELVIS W/CONTRAST: CPT

## 2024-12-06 PROCEDURE — 84702 CHORIONIC GONADOTROPIN TEST: CPT | Performed by: STUDENT IN AN ORGANIZED HEALTH CARE EDUCATION/TRAINING PROGRAM

## 2024-12-06 PROCEDURE — 99244 OFF/OP CNSLTJ NEW/EST MOD 40: CPT | Performed by: NURSE PRACTITIONER

## 2024-12-06 RX ORDER — DICYCLOMINE HYDROCHLORIDE 20 MG/1
20 TABLET ORAL 4 TIMES DAILY PRN
Qty: 20 TABLET | Refills: 0 | Status: SHIPPED | OUTPATIENT
Start: 2024-12-06 | End: 2024-12-16

## 2024-12-06 RX ORDER — POLYETHYLENE GLYCOL 3350, SODIUM SULFATE ANHYDROUS, SODIUM BICARBONATE, SODIUM CHLORIDE, POTASSIUM CHLORIDE 236; 22.74; 6.74; 5.86; 2.97 G/4L; G/4L; G/4L; G/4L; G/4L
4 POWDER, FOR SOLUTION ORAL ONCE
Qty: 4000 ML | Refills: 0 | Status: SHIPPED | OUTPATIENT
Start: 2024-12-06 | End: 2024-12-06

## 2024-12-06 RX ORDER — AMOXICILLIN AND CLAVULANATE POTASSIUM 875; 125 MG/1; MG/1
1 TABLET, FILM COATED ORAL ONCE
Status: COMPLETED | OUTPATIENT
Start: 2024-12-06 | End: 2024-12-06

## 2024-12-06 RX ORDER — SENNOSIDES 8.6 MG/1
1 TABLET ORAL AS NEEDED
COMMUNITY

## 2024-12-06 RX ORDER — DICYCLOMINE HYDROCHLORIDE 10 MG/1
20 CAPSULE ORAL ONCE
Status: COMPLETED | OUTPATIENT
Start: 2024-12-06 | End: 2024-12-06

## 2024-12-06 RX ORDER — AMOXICILLIN AND CLAVULANATE POTASSIUM 875; 125 MG/1; MG/1
1 TABLET, FILM COATED ORAL EVERY 12 HOURS
Qty: 14 TABLET | Refills: 0 | Status: SHIPPED | OUTPATIENT
Start: 2024-12-06 | End: 2024-12-13

## 2024-12-06 RX ORDER — POLYETHYLENE GLYCOL 3350 17 G/17G
17 POWDER, FOR SOLUTION ORAL AS NEEDED
COMMUNITY

## 2024-12-06 RX ORDER — CALCIUM CARBONATE 200(500)MG
1 TABLET,CHEWABLE ORAL AS NEEDED
COMMUNITY

## 2024-12-06 RX ORDER — KETOROLAC TROMETHAMINE 30 MG/ML
15 INJECTION, SOLUTION INTRAMUSCULAR; INTRAVENOUS ONCE
Status: COMPLETED | OUTPATIENT
Start: 2024-12-06 | End: 2024-12-06

## 2024-12-06 ASSESSMENT — ENCOUNTER SYMPTOMS
SHORTNESS OF BREATH: 0
FREQUENCY: 0
FLANK PAIN: 0
AGITATION: 0
DIZZINESS: 0
WEAKNESS: 0
FATIGUE: 0
FEVER: 0
MYALGIAS: 0
NUMBNESS: 0
LIGHT-HEADEDNESS: 0
SORE THROAT: 0
ARTHRALGIAS: 0
JOINT SWELLING: 0
NERVOUS/ANXIOUS: 0
EYE PAIN: 0
PALPITATIONS: 0
PHOTOPHOBIA: 0
HEADACHES: 0
BACK PAIN: 0
DYSURIA: 0
HALLUCINATIONS: 0
DIAPHORESIS: 0
CHILLS: 0
ADENOPATHY: 0
HEMATURIA: 0
COUGH: 0
WHEEZING: 0

## 2024-12-06 ASSESSMENT — PAIN - FUNCTIONAL ASSESSMENT
PAIN_FUNCTIONAL_ASSESSMENT: 0-10
PAIN_FUNCTIONAL_ASSESSMENT: 0-10

## 2024-12-06 ASSESSMENT — PAIN DESCRIPTION - PAIN TYPE: TYPE: ACUTE PAIN

## 2024-12-06 ASSESSMENT — COLUMBIA-SUICIDE SEVERITY RATING SCALE - C-SSRS
6. HAVE YOU EVER DONE ANYTHING, STARTED TO DO ANYTHING, OR PREPARED TO DO ANYTHING TO END YOUR LIFE?: NO
2. HAVE YOU ACTUALLY HAD ANY THOUGHTS OF KILLING YOURSELF?: NO
1. IN THE PAST MONTH, HAVE YOU WISHED YOU WERE DEAD OR WISHED YOU COULD GO TO SLEEP AND NOT WAKE UP?: NO

## 2024-12-06 ASSESSMENT — LIFESTYLE VARIABLES
EVER FELT BAD OR GUILTY ABOUT YOUR DRINKING: NO
EVER HAD A DRINK FIRST THING IN THE MORNING TO STEADY YOUR NERVES TO GET RID OF A HANGOVER: NO
HAVE PEOPLE ANNOYED YOU BY CRITICIZING YOUR DRINKING: NO
HAVE YOU EVER FELT YOU SHOULD CUT DOWN ON YOUR DRINKING: NO
TOTAL SCORE: 0

## 2024-12-06 ASSESSMENT — PAIN SCALES - GENERAL
PAINLEVEL_OUTOF10: 3
PAINLEVEL_OUTOF10: 6
PAINLEVEL_OUTOF10: 7

## 2024-12-06 ASSESSMENT — PAIN DESCRIPTION - LOCATION: LOCATION: ABDOMEN

## 2024-12-06 NOTE — PATIENT INSTRUCTIONS
Thanks for coming to the GI clinic.     I would like you to get an EGD.     I would like you to get a colonoscopy.   Please read all of the instructions 7 days before your colonoscopy.   You will need to take ONLY clear liquids the ENTIRE day before your procedure. These include (clear fruit juices, soda, Gatorade, broth, jello and coffee/tea) Avoid red and purple drinks. No cream or milk in the coffee.   You will need to take a bowel preparation.   You will also need a .      Please call [phone] to schedule the EGD and colonoscopy.     Please obtain stool studies for infection prior to EGD and colonoscopy.     Follow up with me in clinic 3 weeks after completion of the above testing.

## 2024-12-06 NOTE — H&P (VIEW-ONLY)
Subjective   Patient ID: Taty Sosa is a 39 y.o. female who presents for colitis.     This is a 39 year old WF with an extensive history which includes anxiety/depression, PTSD,  GERD with positive pH impedence testing, asthma, CHUCK on CPAP, migraine headaches, RLS, and inappropriate sinus tachycardia who is presenting to the GI clinic for an initial visit.     History per pt and review of EMR     Pt is here for a same day appointment.     Yesterday evening at 10 PM she began having explosive diarrhea and cramping LLQ abdominal pain. Initially the diarrhea was black and tarry, but it later became bloody (bright red blood). She's had 10 episodes of diarrhea so far.     Vomited twice from the above pain (no associated nausea).     Denies eating anything undercooked prior to the above symptom onset. [place] who ate dinner with her did not get sick afterwards.     She was seen in the [facility] ED early this morning for the above where CT A/P with IV contrast noted evidence of infectious vs inflammatory colitis from the transverse colon to the rectum. She was prescribed Augmentin and referred to GI.     ROS positive for an episode of rectal tenesmus since the above symptoms began.     ROS positive for longstanding nausea for 10 years unrelated to the above.     Reports daily heartburn and regurgitation (TUMS help).     ROS positive for esophageal dysphagia to bread and pills.     ROS positive for a 2-3 lb unintentional weight loss as of recent.    Reports at baseline she's been having constipation and abdominal cramping for many years for which she takes Senokot and Miralax.  Has hard pellet stools with the constipation. She's had to digitally disimpact herself.     Denies odynophagia and hematemesis.     Uses Excedrin on occasion.     Went on a cruise to [place] in 4/2024 (had self limited respiratory illness afterwards).     In 10/2024 she traveled to [place] once again ([place] and [place]).      She has  "no children. She's going to be doing IVF in 2025.     EGD for heartburn  [facility]: normal; no specimens collected     Lab Results   Component Value Date    WBC 13.5 (H) 2024    HGB 13.8 2024    HCT 40.4 2024    MCV 92 2024     2024      Lab Results   Component Value Date    CREATININE 1.04 2024    BUN 12 2024     2024    K 4.3 2024     2024    CO2 25 2024      Lab Results   Component Value Date    ALT 15 2024    AST 22 2024    ALKPHOS 50 2024    BILITOT 0.2 2024      Lab Results   Component Value Date    TSH 0.68 2024        Past medical history:   See above  Pericarditis after dental extraction 16 years ago per pt     Past surgical history:  [place] teeth extraction   Nissen fundoplication ( [facility])   EP study     Family history:   Paternal grandmother  of metastatic stomach cancer  (diagnosed around  age 72)   No known IBD  Father has digestive issues including diarrhea, but \"won't go the doctor\" for evaluation    Social history:   Rarely drinks alcohol; denies heavy alcohol consumption  Denies use of tobacco and illicit drugs  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   HENT:  Negative for congestion, ear pain, hearing loss, sneezing and sore throat.    Eyes:  Negative for photophobia, pain and visual disturbance.   Respiratory:  Negative for cough, shortness of breath and wheezing.    Cardiovascular:  Negative for chest pain, palpitations and leg swelling.   Endocrine: Negative for cold intolerance and heat intolerance.   Genitourinary:  Negative for dysuria, flank pain, frequency and hematuria.   Musculoskeletal:  Negative for arthralgias, back pain, gait problem, joint swelling and myalgias.   Skin:  Negative for rash.   Neurological:  Negative for dizziness, syncope, " "weakness, light-headedness, numbness and headaches.   Hematological:  Negative for adenopathy.   Psychiatric/Behavioral:  Negative for agitation and hallucinations. The patient is not nervous/anxious.      Allergies   Allergen Reactions    Adhesive Tape-Silicones Rash and Other     Redness - Irritation      Current Outpatient Medications   Medication Sig Dispense Refill    ALPRAZolam (Xanax) 0.25 mg tablet Take 1 tablet PO twice a day as needed for anxiety or for sleep. 10 tablet 0    amoxicillin-pot clavulanate (Augmentin) 875-125 mg tablet Take 1 tablet by mouth every 12 hours for 7 days. 14 tablet 0    aspirin-acetaminophen-caffeine (Excedrin Extra Strength) 250-250-65 mg tablet Take 2 tablets by mouth once daily. (Patient taking differently: Take 2 tablets by mouth if needed.)      calcium carbonate (Tums) 200 mg calcium chewable tablet Chew 1 tablet (500 mg) if needed for indigestion or heartburn.      dicyclomine (Bentyl) 20 mg tablet Take 1 tablet (20 mg) by mouth 4 times a day as needed (Abdominal pain and cramping) for up to 10 days. 20 tablet 0    loratadine (Claritin) 10 mg tablet Take 1 tablet (10 mg) by mouth once daily. (Patient taking differently: Take 1 tablet (10 mg) by mouth if needed.)      polyethylene glycol (Miralax) 17 gram/dose powder Mix 17 g of powder and drink if needed for constipation.      PRENATAL 2-IRON-FOLIC ACID-OM3 ORAL Prenatal Adult Gummy/DHA/FA      sennosides (Senokot) 8.6 mg tablet Take 1 tablet (8.6 mg) by mouth if needed for constipation.       No current facility-administered medications for this visit.          Objective     /79   Pulse 68   Temp 36.5 °C (97.7 °F)   Ht 1.651 m (5' 5\")   Wt 64.9 kg (143 lb)   LMP 12/06/2024   BMI 23.80 kg/m²     Physical Exam  Constitutional:       General: She is not in acute distress.     Appearance: Normal appearance.   HENT:      Head: Normocephalic and atraumatic.   Eyes:      Conjunctiva/sclera: Conjunctivae normal. "   Cardiovascular:      Rate and Rhythm: Normal rate and regular rhythm.      Heart sounds: No murmur heard.     No gallop.   Pulmonary:      Effort: Pulmonary effort is normal.      Breath sounds: Normal breath sounds.   Abdominal:      General: Bowel sounds are normal. There is no distension.      Tenderness: There is abdominal tenderness in the epigastric area and left lower quadrant. There is no guarding.      Comments: Mild abdominal tenderness as described   Musculoskeletal:         General: No swelling or deformity. Normal range of motion.      Cervical back: Normal range of motion. No rigidity.   Skin:     General: Skin is warm and dry.      Coloration: Skin is not jaundiced.      Findings: No lesion or rash.   Neurological:      General: No focal deficit present.      Mental Status: She is alert and oriented to person, place, and time.   Psychiatric:         Mood and Affect: Mood normal.         Assessment/Plan   Problem List Items Addressed This Visit       Dysphagia    Relevant Orders    Esophagogastroduodenoscopy (EGD)     Other Visit Diagnoses       Colitis    -  Primary    Relevant Orders    Colonoscopy Diagnostic    Bloody stools        Bloody diarrhea        Relevant Orders    C. difficile, PCR    Stool Pathogen Panel, PCR    Ova/Para + Giardia/Cryptosporidium Antigen    Colonoscopy Diagnostic    Gastroesophageal reflux disease, unspecified whether esophagitis present        Relevant Orders    Esophagogastroduodenoscopy (EGD)    LLQ abdominal pain               Bloody diarrhea, rectal tenesmus, and LLQ abdominal pain in the setting of colitis on CT: consider IBD, but this seems less likely in the setting of what I suspect to be IBS-C at baseline. Also consider infectious colitis (with leukocytosis) and NSAID related colitis.   - will proceed with colonoscopy with random biopsies  - Golytely split bowel prep   - check Cdiff PCR, stool pathogen panel, stool O/P     2. GERD, esophageal dysphagia to  solids: most likely GERD related esophageal dysphagia, but would also consider EoE.   - will proceed with EGD with esophageal biopsies  - recommend PPI, but she declined at this time     3. Follow up:  - return to clinic 3 weeks after completion of the above

## 2024-12-06 NOTE — ED PROVIDER NOTES
History of Present Illness     History provided by: Patient  Limitations to History: None  External Records Reviewed with Brief Summary:  Prior outpatient notes    HPI:  Taty Sosa is a 39 y.o. female Who presents with bloody stool.  Patient states that around 10 PM this evening she had explosive diarrhea that was very dark in nature with a foul odor.  She notes lower abdominal pain, nausea and vomiting.  She states that she has had several diarrheal bowel movement since then and has progressively lightened up to now being mostly blood and mucus.  Prior history of chronic abdominal pain, has not followed up with GI as previously recommended.  Not on blood thinners.  No prior history of diverticulitis.  Long history of GERD, no history of ulcers.  Did take Excedrin Migraine earlier today.    Physical Exam   Triage vitals:  T 36.5 °C (97.7 °F)  HR 81  /90  RR 17  O2 99 %      Physical Exam  Vitals and nursing note reviewed. Exam conducted with a chaperone present.   Constitutional:       General: She is not in acute distress.     Appearance: She is not ill-appearing.   HENT:      Head: Normocephalic and atraumatic.      Mouth/Throat:      Mouth: Mucous membranes are moist.      Pharynx: Oropharynx is clear.   Eyes:      Extraocular Movements: Extraocular movements intact.      Conjunctiva/sclera: Conjunctivae normal.      Pupils: Pupils are equal, round, and reactive to light.   Cardiovascular:      Rate and Rhythm: Normal rate and regular rhythm.   Pulmonary:      Effort: Pulmonary effort is normal. No respiratory distress.      Breath sounds: Normal breath sounds.   Abdominal:      General: There is no distension.      Palpations: Abdomen is soft.      Tenderness: There is abdominal tenderness in the right upper quadrant, periumbilical area, suprapubic area and left lower quadrant.   Genitourinary:     Rectum: Internal hemorrhoid present. No external hemorrhoid. Normal anal tone.      Comments: No  dark or bloody stool noted on glove, palpable mass questionable for possible internal hemorrhoid  Musculoskeletal:         General: No swelling or deformity. Normal range of motion.      Cervical back: Normal range of motion and neck supple.   Skin:     General: Skin is warm and dry.      Capillary Refill: Capillary refill takes less than 2 seconds.   Neurological:      General: No focal deficit present.      Mental Status: She is alert and oriented to person, place, and time. Mental status is at baseline.   Psychiatric:         Mood and Affect: Mood normal.         Behavior: Behavior normal.          Medical Decision Making & ED Course   Medical Decision Makin y.o. female presents with generalized abdominal pain.  Considered biliary etiology, pancreatitis, PUD, perforated bowel, hemorrhoid, diverticulitis, gastroenteritis, colitis.  Patient with small bloody mucus collections being passed with bowel movements.  No fevers or chills.  Labs and CT abdomen ordered.  My clinical concern is for possible diverticulitis.  Patient has no prior history of diverticulitis and has not had a colonoscopy.  No significant anemia.  At the end of my shift labs and CT imaging are still pending.  Patient care turned over to oncoming provider to follow up imaging and lab studies.   ----  Social Determinants of Health which Significantly Impact Care: None identified     EKG Independent Interpretation: EKG not obtained    Independent Result Review and Interpretation: Relevant laboratory and radiographic results were reviewed and independently interpreted by myself.  As necessary, they are commented on in the ED Course.    Chronic conditions affecting the patient's care: As documented above in [facility]    The patient was discussed with the following consultants/services: None    Care Considerations: As documented above in [facility]    ED Course:  ED Course as of 24 0358   Fri Dec 06, 2024   0507 Patient currently menstruating, not  pregnant.  []   0543 WBC(!): 13.5  Mildly elevated, could be related to vomiting []   0553 HEMOGLOBIN: 13.8  No anemia []      ED Course User Index  [] Sally Norris MD         Diagnoses as of 12/07/24 0358   Bloody stools   Colitis       Procedures   Procedures    Sally Norris MD  Emergency Medicine     Sally Norris MD  12/06/24 0557       Sally Norris MD  12/07/24 0358

## 2024-12-06 NOTE — ED PROVIDER NOTES
Patient was initially seen by my colleague and endorsed to me on signout.    Briefly, patient is a 39-year-old female that presents to the emergency department for evaluation of bloody stool, diarrhea and abdominal pain.  Patient was signed out to me pending CT results.  Blood work was remarkable for slight leukocytosis white count of 13 however she has normal electrolytes and normal kidney function.      Exam  General: Awake, alert, no obvious distress  HEENT: Moist mucous membranes, head is normocephalic, atraumatic, extraocular eye movements intact  Pulmonary: Respirations easy, nonlabored  Cardiac: Regular rate and rhythm  Abdomen: Soft, nontender, distended    CT scan of the on pelvis does show some mild concentric wall thickening of the colon near the rectum to the mid transverse colon concerning for possible infectious or inflammatory colitis.  Patient be treated with Augmentin for suspected bacterial colitis at this time.  She was given gastroenterology to follow-up with and we discharged with prescriptions for p.o. Augmentin, p.o. Bentyl.  Return precautions were discussed patient.     Melecio Santoro,   12/06/24 0806

## 2024-12-06 NOTE — DISCHARGE INSTRUCTIONS
Take antibiotics to to completion as prescribed.  If symptoms worsen or change he can return at any time for further evaluation and treatment.  Follow-up with gastroenterology by calling their office today to schedule close follow-up appointment.

## 2024-12-06 NOTE — PROGRESS NOTES
Subjective   Patient ID: Taty Sosa is a 39 y.o. female who presents for colitis.     This is a 39 year old WF with an extensive history which includes anxiety/depression, PTSD,  GERD with positive pH impedence testing, asthma, CHUCK on CPAP, migraine headaches, RLS, and inappropriate sinus tachycardia who is presenting to the GI clinic for an initial visit.     History per pt and review of EMR     Pt is here for a same day appointment.     Yesterday evening at 10 PM she began having explosive diarrhea and cramping LLQ abdominal pain. Initially the diarrhea was black and tarry, but it later became bloody (bright red blood). She's had 10 episodes of diarrhea so far.     Vomited twice from the above pain (no associated nausea).     Denies eating anything undercooked prior to the above symptom onset. [place] who ate dinner with her did not get sick afterwards.     She was seen in the [facility] ED early this morning for the above where CT A/P with IV contrast noted evidence of infectious vs inflammatory colitis from the transverse colon to the rectum. She was prescribed Augmentin and referred to GI.     ROS positive for an episode of rectal tenesmus since the above symptoms began.     ROS positive for longstanding nausea for 10 years unrelated to the above.     Reports daily heartburn and regurgitation (TUMS help).     ROS positive for esophageal dysphagia to bread and pills.     ROS positive for a 2-3 lb unintentional weight loss as of recent.    Reports at baseline she's been having constipation and abdominal cramping for many years for which she takes Senokot and Miralax.  Has hard pellet stools with the constipation. She's had to digitally disimpact herself.     Denies odynophagia and hematemesis.     Uses Excedrin on occasion.     Went on a cruise to [place] in 4/2024 (had self limited respiratory illness afterwards).     In 10/2024 she traveled to [place] once again ([place] and [place]).      She has  "no children. She's going to be doing IVF in 2025.     EGD for heartburn  [facility]: normal; no specimens collected     Lab Results   Component Value Date    WBC 13.5 (H) 2024    HGB 13.8 2024    HCT 40.4 2024    MCV 92 2024     2024      Lab Results   Component Value Date    CREATININE 1.04 2024    BUN 12 2024     2024    K 4.3 2024     2024    CO2 25 2024      Lab Results   Component Value Date    ALT 15 2024    AST 22 2024    ALKPHOS 50 2024    BILITOT 0.2 2024      Lab Results   Component Value Date    TSH 0.68 2024        Past medical history:   See above  Pericarditis after dental extraction 16 years ago per pt     Past surgical history:  [place] teeth extraction   Nissen fundoplication ( [facility])   EP study     Family history:   Paternal grandmother  of metastatic stomach cancer  (diagnosed around  age 72)   No known IBD  Father has digestive issues including diarrhea, but \"won't go the doctor\" for evaluation    Social history:   Rarely drinks alcohol; denies heavy alcohol consumption  Denies use of tobacco and illicit drugs  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   HENT:  Negative for congestion, ear pain, hearing loss, sneezing and sore throat.    Eyes:  Negative for photophobia, pain and visual disturbance.   Respiratory:  Negative for cough, shortness of breath and wheezing.    Cardiovascular:  Negative for chest pain, palpitations and leg swelling.   Endocrine: Negative for cold intolerance and heat intolerance.   Genitourinary:  Negative for dysuria, flank pain, frequency and hematuria.   Musculoskeletal:  Negative for arthralgias, back pain, gait problem, joint swelling and myalgias.   Skin:  Negative for rash.   Neurological:  Negative for dizziness, syncope, " "weakness, light-headedness, numbness and headaches.   Hematological:  Negative for adenopathy.   Psychiatric/Behavioral:  Negative for agitation and hallucinations. The patient is not nervous/anxious.      Allergies   Allergen Reactions    Adhesive Tape-Silicones Rash and Other     Redness - Irritation      Current Outpatient Medications   Medication Sig Dispense Refill    ALPRAZolam (Xanax) 0.25 mg tablet Take 1 tablet PO twice a day as needed for anxiety or for sleep. 10 tablet 0    amoxicillin-pot clavulanate (Augmentin) 875-125 mg tablet Take 1 tablet by mouth every 12 hours for 7 days. 14 tablet 0    aspirin-acetaminophen-caffeine (Excedrin Extra Strength) 250-250-65 mg tablet Take 2 tablets by mouth once daily. (Patient taking differently: Take 2 tablets by mouth if needed.)      calcium carbonate (Tums) 200 mg calcium chewable tablet Chew 1 tablet (500 mg) if needed for indigestion or heartburn.      dicyclomine (Bentyl) 20 mg tablet Take 1 tablet (20 mg) by mouth 4 times a day as needed (Abdominal pain and cramping) for up to 10 days. 20 tablet 0    loratadine (Claritin) 10 mg tablet Take 1 tablet (10 mg) by mouth once daily. (Patient taking differently: Take 1 tablet (10 mg) by mouth if needed.)      polyethylene glycol (Miralax) 17 gram/dose powder Mix 17 g of powder and drink if needed for constipation.      PRENATAL 2-IRON-FOLIC ACID-OM3 ORAL Prenatal Adult Gummy/DHA/FA      sennosides (Senokot) 8.6 mg tablet Take 1 tablet (8.6 mg) by mouth if needed for constipation.       No current facility-administered medications for this visit.          Objective     /79   Pulse 68   Temp 36.5 °C (97.7 °F)   Ht 1.651 m (5' 5\")   Wt 64.9 kg (143 lb)   LMP 12/06/2024   BMI 23.80 kg/m²     Physical Exam  Constitutional:       General: She is not in acute distress.     Appearance: Normal appearance.   HENT:      Head: Normocephalic and atraumatic.   Eyes:      Conjunctiva/sclera: Conjunctivae normal. "   Cardiovascular:      Rate and Rhythm: Normal rate and regular rhythm.      Heart sounds: No murmur heard.     No gallop.   Pulmonary:      Effort: Pulmonary effort is normal.      Breath sounds: Normal breath sounds.   Abdominal:      General: Bowel sounds are normal. There is no distension.      Tenderness: There is abdominal tenderness in the epigastric area and left lower quadrant. There is no guarding.      Comments: Mild abdominal tenderness as described   Musculoskeletal:         General: No swelling or deformity. Normal range of motion.      Cervical back: Normal range of motion. No rigidity.   Skin:     General: Skin is warm and dry.      Coloration: Skin is not jaundiced.      Findings: No lesion or rash.   Neurological:      General: No focal deficit present.      Mental Status: She is alert and oriented to person, place, and time.   Psychiatric:         Mood and Affect: Mood normal.         Assessment/Plan   Problem List Items Addressed This Visit       Dysphagia    Relevant Orders    Esophagogastroduodenoscopy (EGD)     Other Visit Diagnoses       Colitis    -  Primary    Relevant Orders    Colonoscopy Diagnostic    Bloody stools        Bloody diarrhea        Relevant Orders    C. difficile, PCR    Stool Pathogen Panel, PCR    Ova/Para + Giardia/Cryptosporidium Antigen    Colonoscopy Diagnostic    Gastroesophageal reflux disease, unspecified whether esophagitis present        Relevant Orders    Esophagogastroduodenoscopy (EGD)    LLQ abdominal pain               Bloody diarrhea, rectal tenesmus, and LLQ abdominal pain in the setting of colitis on CT: consider IBD, but this seems less likely in the setting of what I suspect to be IBS-C at baseline. Also consider infectious colitis (with leukocytosis) and NSAID related colitis.   - will proceed with colonoscopy with random biopsies  - Golytely split bowel prep   - check Cdiff PCR, stool pathogen panel, stool O/P     2. GERD, esophageal dysphagia to  solids: most likely GERD related esophageal dysphagia, but would also consider EoE.   - will proceed with EGD with esophageal biopsies  - recommend PPI, but she declined at this time     3. Follow up:  - return to clinic 3 weeks after completion of the above

## 2024-12-06 NOTE — ED TRIAGE NOTES
Patient states last night she had a black stool and then it has become bright red with mucus and clots. Patient states its not filling toilet, more when wipes and some in stool. Patient ambulatory on arrival to triage. Denies any SOB, CP or dizziness. Patient states this has happened in past but she never was seen for it.

## 2024-12-09 LAB

## 2024-12-10 LAB
CRYPTOSP AG STL QL IA: NEGATIVE
G LAMBLIA AG STL QL IA: NEGATIVE

## 2024-12-13 LAB — O+P STL MICRO: NEGATIVE

## 2024-12-17 ENCOUNTER — APPOINTMENT (OUTPATIENT)
Dept: SLEEP MEDICINE | Facility: CLINIC | Age: 39
End: 2024-12-17
Payer: COMMERCIAL

## 2024-12-17 RX ORDER — ALBUTEROL SULFATE 90 UG/1
2 INHALANT RESPIRATORY (INHALATION) EVERY 4 HOURS PRN
COMMUNITY
Start: 2024-04-02

## 2024-12-24 ENCOUNTER — HOSPITAL ENCOUNTER (OUTPATIENT)
Dept: GASTROENTEROLOGY | Facility: HOSPITAL | Age: 39
Discharge: HOME | End: 2024-12-24
Payer: COMMERCIAL

## 2024-12-24 ENCOUNTER — ANESTHESIA (OUTPATIENT)
Dept: GASTROENTEROLOGY | Facility: HOSPITAL | Age: 39
End: 2024-12-24
Payer: COMMERCIAL

## 2024-12-24 ENCOUNTER — ANESTHESIA EVENT (OUTPATIENT)
Dept: GASTROENTEROLOGY | Facility: HOSPITAL | Age: 39
End: 2024-12-24
Payer: COMMERCIAL

## 2024-12-24 VITALS
BODY MASS INDEX: 22.48 KG/M2 | WEIGHT: 134.92 LBS | HEIGHT: 65 IN | HEART RATE: 70 BPM | TEMPERATURE: 96.8 F | OXYGEN SATURATION: 99 % | SYSTOLIC BLOOD PRESSURE: 108 MMHG | DIASTOLIC BLOOD PRESSURE: 75 MMHG | RESPIRATION RATE: 18 BRPM

## 2024-12-24 DIAGNOSIS — R13.19 ESOPHAGEAL DYSPHAGIA: ICD-10-CM

## 2024-12-24 DIAGNOSIS — K21.9 GASTROESOPHAGEAL REFLUX DISEASE, UNSPECIFIED WHETHER ESOPHAGITIS PRESENT: ICD-10-CM

## 2024-12-24 DIAGNOSIS — K52.9 COLITIS: ICD-10-CM

## 2024-12-24 DIAGNOSIS — R19.7 BLOODY DIARRHEA: ICD-10-CM

## 2024-12-24 PROCEDURE — 7100000009 HC PHASE TWO TIME - INITIAL BASE CHARGE

## 2024-12-24 PROCEDURE — 45380 COLONOSCOPY AND BIOPSY: CPT | Performed by: INTERNAL MEDICINE

## 2024-12-24 PROCEDURE — 3700000002 HC GENERAL ANESTHESIA TIME - EACH INCREMENTAL 1 MINUTE

## 2024-12-24 PROCEDURE — 3700000001 HC GENERAL ANESTHESIA TIME - INITIAL BASE CHARGE

## 2024-12-24 PROCEDURE — 43239 EGD BIOPSY SINGLE/MULTIPLE: CPT | Performed by: INTERNAL MEDICINE

## 2024-12-24 PROCEDURE — 2500000004 HC RX 250 GENERAL PHARMACY W/ HCPCS (ALT 636 FOR OP/ED): Performed by: ANESTHESIOLOGIST ASSISTANT

## 2024-12-24 PROCEDURE — A45380 PR COLONOSCOPY,BIOPSY: Performed by: ANESTHESIOLOGIST ASSISTANT

## 2024-12-24 PROCEDURE — 7100000010 HC PHASE TWO TIME - EACH INCREMENTAL 1 MINUTE

## 2024-12-24 PROCEDURE — A45380 PR COLONOSCOPY,BIOPSY: Performed by: ANESTHESIOLOGY

## 2024-12-24 RX ORDER — PHENYLEPHRINE HCL IN 0.9% NACL 1 MG/10 ML
SYRINGE (ML) INTRAVENOUS AS NEEDED
Status: DISCONTINUED | OUTPATIENT
Start: 2024-12-24 | End: 2024-12-24

## 2024-12-24 RX ORDER — LIDOCAINE HYDROCHLORIDE 20 MG/ML
INJECTION, SOLUTION EPIDURAL; INFILTRATION; INTRACAUDAL; PERINEURAL AS NEEDED
Status: DISCONTINUED | OUTPATIENT
Start: 2024-12-24 | End: 2024-12-24

## 2024-12-24 RX ORDER — FENTANYL CITRATE 50 UG/ML
INJECTION, SOLUTION INTRAMUSCULAR; INTRAVENOUS AS NEEDED
Status: DISCONTINUED | OUTPATIENT
Start: 2024-12-24 | End: 2024-12-24

## 2024-12-24 RX ORDER — PROPOFOL 10 MG/ML
INJECTION, EMULSION INTRAVENOUS CONTINUOUS PRN
Status: DISCONTINUED | OUTPATIENT
Start: 2024-12-24 | End: 2024-12-24

## 2024-12-24 RX ORDER — MIDAZOLAM HYDROCHLORIDE 1 MG/ML
INJECTION INTRAMUSCULAR; INTRAVENOUS AS NEEDED
Status: DISCONTINUED | OUTPATIENT
Start: 2024-12-24 | End: 2024-12-24

## 2024-12-24 ASSESSMENT — PAIN SCALES - GENERAL
PAINLEVEL_OUTOF10: 0 - NO PAIN
PAINLEVEL_OUTOF10: 2
PAINLEVEL_OUTOF10: 0 - NO PAIN

## 2024-12-24 ASSESSMENT — PAIN DESCRIPTION - DESCRIPTORS: DESCRIPTORS: CRAMPING

## 2024-12-24 ASSESSMENT — PAIN - FUNCTIONAL ASSESSMENT
PAIN_FUNCTIONAL_ASSESSMENT: 0-10

## 2024-12-24 ASSESSMENT — COLUMBIA-SUICIDE SEVERITY RATING SCALE - C-SSRS
1. IN THE PAST MONTH, HAVE YOU WISHED YOU WERE DEAD OR WISHED YOU COULD GO TO SLEEP AND NOT WAKE UP?: NO
6. HAVE YOU EVER DONE ANYTHING, STARTED TO DO ANYTHING, OR PREPARED TO DO ANYTHING TO END YOUR LIFE?: NO
2. HAVE YOU ACTUALLY HAD ANY THOUGHTS OF KILLING YOURSELF?: NO

## 2024-12-24 NOTE — DISCHARGE INSTRUCTIONS
Patient Instructions after a Colonoscopy      The anesthetics, sedatives or narcotics which were given to you today will be acting in your body for the next 24 hours, so you might feel a little sleepy or groggy.  This feeling should slowly wear off. Carefully read and follow the instructions.     You received sedation today:  - Do not drive or operate any machinery or power tools of any kind.   - No alcoholic beverages today, not even beer or wine.  - Do not make any important decisions or sign any legal documents.  - No over the counter medications that contain alcohol or that may cause drowsiness.  - Do not make any important decisions or sign any legal documents.  - Make sure you have someone with you for first 24 hours.    While it is common to experience mild to moderate abdominal distention, gas, or belching after your procedure, if any of these symptoms occur following discharge from the GI Lab or within one week of having your procedure, call the [facility] to be advised whether a visit to your nearest Urgent Care or Emergency Department is indicated.  Take this paper with you if you go.     - If you develop an allergic reaction to the medications that were given during your procedure such as difficulty breathing, rash, hives, severe nausea, vomiting or lightheadedness.  - If you experience chest pain, shortness of breath, severe abdominal pain, fevers and chills.  -If you develop signs and symptoms of bleeding such as blood in your spit, if your stools turn black, tarry, or bloody  - If you have not urinated within 8 hours following your procedure.  - If your IV site becomes painful, red, inflamed, or looks infected.    If you received a biopsy/polypectomy/sphincterotomy the following instructions apply below:    __ Do not use Aspirin containing products, non-steroidal medications or anti-coagulants for one week following your procedure. (Examples of these types of medications are: Advil,  Arthrotec, Aleve, Coumadin, Ecotrin, Heparin, Ibuprofen, Indocin, Motrin, Naprosyn, Nuprin, Plavix, Vioxx, and Voltarin, or their generic forms.  This list is not all-inclusive.  Check with your physician or pharmacist before resuming medications.)   __ Eat a soft diet today.  Avoid foods that are poorly digested for the next 24 hours.  These foods would include: nuts, beans, lettuce, red meats, and fried foods. Start with liquids and advance your diet as tolerated, gradually work up to eating solids.   __ Do not have a Barium Study or Enema for one week.    Your physician recommends the additional following instructions:    -You have a contact number available for emergencies. The signs and symptoms of potential delayed complications were discussed with you. You may return to normal activities tomorrow.  -Resume your previous diet.  -Continue your present medications.   -We are waiting for your pathology results.  -Your physician has recommended a repeat colonoscopy (date to be determined after pending pathology results are reviewed) for surveillance based on pathology results.  -The findings and recommendations have been discussed with you.  -The findings and recommendations were discussed with your family.  - Please see Medication Reconciliation Form for new medication/medications prescribed.       If you experience any problems or have any questions following discharge from the GI Lab, please call:        Nurse Signature                                                                        Date___________________                                                                            Patient/Responsible Party Signature                                        Date___________________Patient Instructions after an endoscopy or colonoscopy      The anesthetics, sedatives or narcotics which were given to you today will be acting in your body for the next 24 hours, so you might feel a little sleepy or groggy.  This  feeling should slowly wear off. Carefully read and follow the instructions.     You received sedation today:  - Do not drive or operate any machinery or power tools of any kind.   - No alcoholic beverages today, not even beer or wine.  - Do not make any important decisions or sign any legal documents.  - No over the counter medications that contain alcohol or that may cause drowsiness.  - Do not make any important decisions or sign any legal documents.    While it is common to experience mild to moderate abdominal distention, gas, or belching after your procedure, if any of these symptoms occur following discharge from the GI Lab or within one week of having your procedure, call the [facility] to be advised whether a visit to your nearest Urgent Care or Emergency Department is indicated.  Take this paper with you if you go.     - If you develop an allergic reaction to the medications that were given during your procedure such as difficulty breathing, rash, hives, severe nausea, vomiting or lightheadedness.- If you experience chest pain, shortness of breath, severe abdominal pain, fevers and chills.    -If you develop signs and symptoms of bleeding such as blood in your spit, if your stools turn black, tarry, or bloody    - If you have not urinated within 8 hours following your procedure.- If your IV site becomes painful, red, inflamed, or looks infected.

## 2024-12-24 NOTE — ANESTHESIA POSTPROCEDURE EVALUATION
Patient: Taty Sosa    Procedure Summary       Date: 12/24/24 Room / Location: [facility]    Anesthesia Start: 0843 Anesthesia Stop: 0923    Procedures:       COLONOSCOPY      EGD Diagnosis:       Colitis      Bloody diarrhea      Gastroesophageal reflux disease, unspecified whether esophagitis present      Esophageal dysphagia    Scheduled Providers: Kathie Avery MD; Camilo Alexander MD; PERLA Barber Responsible Provider: Camilo Alexander MD    Anesthesia Type: MAC ASA Status: 2            Anesthesia Type: MAC    Vitals Value Taken Time   /75 12/24/24 1000   Temp 36 °C (96.8 °F) 12/24/24 0919   Pulse 70 12/24/24 1000   Resp 18 12/24/24 1000   SpO2 99 % 12/24/24 1000       Anesthesia Post Evaluation    Patient location during evaluation: PACU  Patient participation: complete - patient participated  Level of consciousness: awake and alert  Pain management: adequate  Airway patency: patent  Cardiovascular status: acceptable and hemodynamically stable  Respiratory status: acceptable, spontaneous ventilation and nonlabored ventilation  Hydration status: acceptable  Postoperative Nausea and Vomiting: none        There were no known notable events for this encounter.

## 2024-12-24 NOTE — ANESTHESIA PREPROCEDURE EVALUATION
Patient: Taty Sosa    Procedure Information       Date/Time: 12/24/24 0830    Scheduled providers: Kathie Avery MD; Camilo Alexander MD; PERLA Barber    Procedures:       COLONOSCOPY      EGD    Location: [facility]            Relevant Problems   Cardiac   (+) Angina pectoris   (+) Pleuritic chest pain   (+) Ventricular premature contractions      Neuro   (+) Anxiety   (+) Anxiety with depression   (+) Bipolar disorder   (+) Depression      GI   (+) Dysphagia   (+) Gastroesophageal reflux disease with hiatal hernia      HEENT   (+) Seasonal allergies       Clinical information reviewed:   Tobacco  Allergies  Meds   Med Hx  Surg Hx  OB Status  Fam Hx  Soc   Hx         Past Medical History:   Diagnosis Date    Anxiety     Asthma     exercise induced    Depression     GERD (gastroesophageal reflux disease)     symptomatic    Inappropriate sinus node tachycardia (CMS-HCC)     Migraine     CHUCK on CPAP     Palpitations     Pericarditis (HHS-HCC)     15 years ago after wisdom tooth extraction    PTSD (post-traumatic stress disorder)     Restless leg syndrome     Vitamin D deficiency       Past Surgical History:   Procedure Laterality Date    ESOPHAGOGASTRODUODENOSCOPY      HYSTEROSCOPY  10/31/2024    LAPAROSCOPIC NISSEN FUNDOPLICATION  08/07/2017    OTHER SURGICAL HISTORY  01/12/2016    EP Study    OTHER SURGICAL HISTORY      egg retrieval    WISDOM TOOTH EXTRACTION       Social History     Tobacco Use    Smoking status: Never     Passive exposure: Never    Smokeless tobacco: Never   Vaping Use    Vaping status: Former   Substance Use Topics    Alcohol use: Yes     Comment: Once monthly    Drug use: Not Currently      Current Outpatient Medications   Medication Instructions    albuterol 90 mcg/actuation inhaler 2 puffs, Every 4 hours PRN    ALPRAZolam (Xanax) 0.25 mg tablet Take 1 tablet PO twice a day as needed for anxiety or for sleep.    aspirin-acetaminophen-caffeine (Excedrin  "Extra Strength) 250-250-65 mg tablet 2 tablets, Daily    calcium carbonate (Tums) 200 mg calcium chewable tablet 1 tablet, As needed    loratadine (CLARITIN) 10 mg, Daily    polyethylene glycol (MIRALAX) 17 g, As needed    PRENATAL 2-IRON-FOLIC ACID-OM3 ORAL Prenatal Adult Gummy/DHA/FA    sennosides (Senokot) 8.6 mg tablet 1 tablet, As needed      Allergies   Allergen Reactions    Adhesive Tape-Silicones Rash and Other     Redness - Irritation        Chemistry    Lab Results   Component Value Date/Time     12/06/2024 0523    K 4.3 12/06/2024 0523     12/06/2024 0523    CO2 25 12/06/2024 0523    BUN 12 12/06/2024 0523    CREATININE 1.04 12/06/2024 0523    Lab Results   Component Value Date/Time    CALCIUM 9.1 12/06/2024 0523    ALKPHOS 50 12/06/2024 0523    AST 22 12/06/2024 0523    ALT 15 12/06/2024 0523    BILITOT 0.2 12/06/2024 0523          Lab Results   Component Value Date    HGBA1C 5.1 10/30/2024     Lab Results   Component Value Date/Time    WBC 13.5 (H) 12/06/2024 0523    HGB 13.8 12/06/2024 0523    HCT 40.4 12/06/2024 0523     12/06/2024 0523     Lab Results   Component Value Date/Time    PROTIME 10.8 05/16/2019 1737    INR 1.0 05/16/2019 1737     No results found for: \"ABORH\"  No results found. However, due to the size of the patient record, not all encounters were searched. Please check Results Review for a complete set of results.  No results found for this or any previous visit from the past 1095 days.  Echo 1/26/2019:   Left Ventricle: There is normal left ventricular systolic function.  The estimated ejection fraction is 55-60%.    Normal wall motion seen.    Normal size.      Left Atrium:  The left atrial size is upper limits of normal.      Right Ventricle: The right ventricular cavity size is normal. The  right ventricular global systolic function is    normal.      Right Atrium: The right atrial cavity size is normal.      Aortic Valve: Mild aortic cusp sclerosis is present. " "Systolic  excursion of the aortic valve is normal.      Mitral Valve: Mitral valve leaflet mobility appears normal.   Mild  anterior mitral leaflet myxomatous changes    seen.   Trivial mitral  regurgitation seen.  Did not see any significant mitral valve  prolapse.      Tricuspid Valve: Tricuspid valve leaflet mobility appears normal.  RVSP is 20 mm Hg+RA pressure.      Pericardium: There is no pericardial effusion.      Aorta:  The aortic root is upper limits of normal in size.      Summary     Normal left ventricular systolic function. Estimated ejection  fraction is about 55-60%.   Mild TR.     Normal wall motion seen.  Mild anterior mitral leaflet myxomatous  changes seen.  Trivial mitral    regurgitation seen.  No significant  mitral valve prolapse seen.    Visit Vitals  /78   Pulse 89   Temp 36.2 °C (97.2 °F) (Temporal)   Resp 17   Ht 1.651 m (5' 5\")   Wt 61.2 kg (134 lb 14.7 oz)   LMP 12/06/2024   SpO2 99%   BMI 22.45 kg/m²   OB Status Having periods   Smoking Status Never   BSA 1.68 m²     NPO/Void Status  Carbohydrate Drink Given Prior to Surgery? : N  Date of Last Liquid: 12/24/24  Time of Last Liquid: 0230  Date of Last Solid: 12/22/24  Time of Last Solid: 1800  Last Intake Type: Clear fluids  Time of Last Void: 0736        Physical Exam    Airway  Mallampati: I  TM distance: >3 FB  Neck ROM: full     Cardiovascular - normal exam  Rhythm: regular  Rate: normal     Dental    Pulmonary - normal exam     Abdominal - normal exam             Anesthesia Plan    History of general anesthesia?: yes  History of complications of general anesthesia?: no    ASA 2     MAC   (Standard ASA monitoring.)  intravenous induction   Anesthetic plan and risks discussed with patient.    Plan discussed with CRNA and CAA.        "

## 2024-12-27 ENCOUNTER — TELEPHONE (OUTPATIENT)
Dept: ENDOCRINOLOGY | Facility: CLINIC | Age: 39
End: 2024-12-27
Payer: COMMERCIAL

## 2024-12-27 ENCOUNTER — PATIENT MESSAGE (OUTPATIENT)
Dept: ENDOCRINOLOGY | Facility: CLINIC | Age: 39
End: 2024-12-27
Payer: COMMERCIAL

## 2024-12-27 DIAGNOSIS — N97.9 FEMALE INFERTILITY: ICD-10-CM

## 2024-12-27 DIAGNOSIS — Z31.83 ENCOUNTER FOR ASSISTED REPRODUCTIVE FERTILITY CYCLE: ICD-10-CM

## 2024-12-27 RX ORDER — ESTRADIOL 2 MG/1
2 TABLET ORAL 2 TIMES DAILY
Start: 2024-12-27 | End: 2025-12-27

## 2024-12-27 RX ORDER — ESTRADIOL 2 MG/1
6 TABLET ORAL DAILY
Qty: 90 TABLET | Refills: 2 | Status: SHIPPED | OUTPATIENT
Start: 2024-12-27 | End: 2025-12-27

## 2024-12-27 RX ORDER — PROGESTERONE 50 MG/ML
75 INJECTION, SOLUTION INTRAMUSCULAR DAILY
Qty: 50 ML | Refills: 3 | Status: SHIPPED | OUTPATIENT
Start: 2024-12-27 | End: 2025-12-27

## 2024-12-27 RX ORDER — LIDOCAINE AND PRILOCAINE 25; 25 MG/G; MG/G
CREAM TOPICAL DAILY
Qty: 30 G | Refills: 2 | Status: SHIPPED | OUTPATIENT
Start: 2024-12-27 | End: 2025-12-27

## 2024-12-27 RX ORDER — PROPYLENE GLYCOL/PEG 400 0.3 %-0.4%
DROPS OPHTHALMIC (EYE)
Qty: 30 EACH | Refills: 3 | Status: SHIPPED | OUTPATIENT
Start: 2024-12-27 | End: 2025-03-27

## 2024-12-27 RX ORDER — ASPIRIN 81 MG/1
81 TABLET ORAL DAILY
Qty: 30 TABLET | Refills: 2 | Status: SHIPPED | OUTPATIENT
Start: 2024-12-27 | End: 2025-12-27

## 2024-12-27 NOTE — TELEPHONE ENCOUNTER
Attempted to return patient's call - LM with patient that her forms were resent, due to the incorrect date being filled out, and that the estrace was sent to be signed off today to [facility] so she has over weekend if her period starts. Patient to call office with start of period to set up transfer.     Nava Pereira 12/27/24 3:58 PM

## 2024-12-27 NOTE — PROGRESS NOTES
Boarding Pass Interval FET Checklist    Age: 39 y.o.    Provider: Tiffany Poole MD  Primary RN: Nava Pereira  Reasons for Treatment: Male infertility  Last BMI  24 : 22.45 kg/m²       Past Medical History:   Diagnosis Date    Anxiety     Asthma     exercise induced    Depression     GERD (gastroesophageal reflux disease)     symptomatic    Inappropriate sinus node tachycardia (CMS-HCC)     Migraine     CHUCK on CPAP     Palpitations     Pericarditis (HHS-HCC)     15 years ago after wisdom tooth extraction    PTSD (post-traumatic stress disorder)     Restless leg syndrome     Vitamin D deficiency        Date Done Consultation Results/Comments   10/26/2024          2025 Medication Protocol Fertility Plan Update: Programmed FET  Adjuncts:  ASA 81 mg, doxy 100mg BID 4 day protocol  Tiffany Poole 24 3:00 PM      Fertility Plan Update:  WILL NOT CANCEL WITHOUT TALKING WITH DR POOLE  Stimulated FET: Gonal F 100 Ius daily- will only transfer with a lining >6.   She may prefer DAVID to vaginal supp. Will check progesterone levels every three days to ensure >17.   TRANSFER UNDER ANESTHESIA!  Tiffany Poole 25 3:02 PM         2024 FET Treatment Plan Packet- ID REQ (Every cycle) Received and in chart: Yes (Nava Pereira RN)   2024 IVF Information and Authorization - [employer]/offsite Storage (ID REQ) (Yearly) Received and in chart: Yes (Nava Pereira RN)   2024 [employer] Embryo- Couple or Single Packet (Yearly) Received and in chart: Yes (Nava Pereira RN)   2024 [facility] Waiver (In) Form Received and in chart: Yes (Nava Pereira RN)   2024 Embryo Ship In 2 Embryos ship in;    ID#   Embryo #1 [facility]   Embryo #2 [facility]      2024 Procedure Order Placed [x]   N/A MFM Consult Okay to proceed? N/A   N/A Psych Consult Okay to proceed? N/A   N/A Genetics Consult Okay to proceed? N/A    Other    Date Done Female Labs Results/Comments   2024 T&S  (Q 1 Year) ABO: A  Rh: POS  Antibody: NEG     4/9/2024 Hep B sAg Nonreactive   4/9/2024 Hep C AB Nonreactive   4/9/2024 HIV Nonreactive   4/9/2024 Syphilis Nonreactive   5/16/2024 GC/CT GC: Negative  CT: Negative   4/9/2024 Rubella (Q 5 Years) Positive   4/9/2024 Varicella (Q 5 Years) Positive (A)   4/9/2024 TSH 0.68 (Ref range: 0.44 - 3.98 mIU/L)   10/30/2024 HgbA1C 5.1 (Ref range: See comment %)    Uterine Cavity Eval HSG: FL HYSTEROSALPINGOGRAM (11/13/2023):   IMPRESSION:  Normal HSG with bilateral fallopian tube patency demonstrated.     SIS: N/A     Hyster: (10/31/2024) Procedure: Diagnostic Hysteroscopy   Preop diagnosis: IVF  Post op diagnosis: Same   Assistant: none    Anesthesia: Moderate sedation   IV: None   EBL: 3 cc  Specimens: None   Complications: None   Risks benefits and alternatives of the procedure explained to the patient and informed consent was obtained. Urine pregnancy test was performed and was negative. Time out was performed. The patient was placed in the dorsal lithotomy position and a sterile speculum was placed in the vagina. The cervix was sterilized with Betadine x3.   Tenaculum: Yes  Dilation: Yes  The hysteroscope was placed in the cervix and advanced into the uterine cavity. Normal saline was used for distension media. Images were obtained and findings noted as below.   All instruments were then removed. Good hemostasis was noted. Patient tolerated the procedure well returned to the recovery area in stable condition. .   Findings:   Cavity: Smooth cavity no lesions noted, areas of erythema diffusely throughout cavity  Ostia: Bilateral tubal ostia visualized  Additional Notes: Will treat empirically with doxycycline 100 mg BID x 14 days for presumed chronic endometritis    Harika Rubio 10/31/24 8:48 AM              5/16/2024 Pap Smear Negative for Intraepithelial lesion or malignancy    HPV: Negative   N/A Mammogram ( > 40)               Date Done Miscellaneous Results/Comments    N/A BMI Checklist  BMI > 40 or < 18 Added to chart:      N/A >= 45 Checklist  Added to chart:      **Does not need to be completed prior to placing on IVF calendar**    NEEDS TRANSFER UNDER ANESTHESIA     MD Completion:  Ectopic Risk: No  Medically Complex: Yes - sleep apnea  NEEDS TRANSFER UNDER ANESTHESIA!!  Tiffany Morales 12/30/24 3:01 PM      NEEDS TRANSFER UNDER ANESTHESIA     MD Completion:  Ectopic Risk: No  Medically Complex: Yes - sleep apnea  NEEDS TRANSFER UNDER ANESTHESIA!!  Tiffany Morales 02/18/25 3:02 PM

## 2024-12-27 NOTE — TELEPHONE ENCOUNTER
Reason for call: Call Back  Notes: Pt wanted to double check her Estrace will be sent to the pharmacy today as she anticipates her cycle start tomorrow. She also had questions regarding her paperwork for transfer.

## 2024-12-30 DIAGNOSIS — N71.9 ENDOMETRITIS: Primary | ICD-10-CM

## 2024-12-30 RX ORDER — DOXYCYCLINE 100 MG/1
100 CAPSULE ORAL 2 TIMES DAILY
Qty: 8 CAPSULE | Refills: 0 | Status: SHIPPED | OUTPATIENT
Start: 2024-12-30 | End: 2025-12-30

## 2025-01-03 NOTE — PROGRESS NOTES
Patient called with menses for FET setup.   LMP: 1/3/2024  Protocol: Programmed FET with baby ASA 81mg to be started at time of transfer, doxycycline 100mg BID x 4 days   ** PATIENT NEEDS ANESTHESIA WITH FET  Tentative lining check: 1/20   Tentative progesterone start: 1/23  Tentative transfer date: 1/28 with anesthesia   Number of days of progesterone for transfer: 6    Treatment plan confirmed: yes  In waiver confirmed: yes    Embryo # confirmed: 2 untested  Location of Embryo:   Embryo # to transfer: 2    Boarding pass signed off:  Yes    Tiffany Morales 01/03/25 12:35 PM    Nava Pereira  01/03/2025  8:27 AM

## 2025-01-13 PROCEDURE — RXMED WILLOW AMBULATORY MEDICATION CHARGE

## 2025-01-16 DIAGNOSIS — N97.9 FEMALE INFERTILITY: ICD-10-CM

## 2025-01-17 ENCOUNTER — LAB (OUTPATIENT)
Dept: LAB | Facility: LAB | Age: 40
End: 2025-01-17
Payer: COMMERCIAL

## 2025-01-17 ENCOUNTER — ANCILLARY PROCEDURE (OUTPATIENT)
Dept: ENDOCRINOLOGY | Facility: CLINIC | Age: 40
End: 2025-01-17
Payer: COMMERCIAL

## 2025-01-17 DIAGNOSIS — N97.9 FEMALE INFERTILITY: ICD-10-CM

## 2025-01-17 LAB
ESTRADIOL SERPL-MCNC: 616 PG/ML
PROGEST SERPL-MCNC: 0.3 NG/ML

## 2025-01-17 PROCEDURE — 82670 ASSAY OF TOTAL ESTRADIOL: CPT

## 2025-01-17 PROCEDURE — 84144 ASSAY OF PROGESTERONE: CPT

## 2025-01-17 PROCEDURE — 76857 US EXAM PELVIC LIMITED: CPT

## 2025-01-17 NOTE — PROGRESS NOTES
CYCLING NOTE - LINING CHECK  Cycle #: 1  Reason For Treatment: Male Infertility  Protocol: Programmed FET with baby ASA 81mg to be started at time of transfer, doxycycline 100mg BID x 4 days   Day of stim: CD 15  Patient Hx: 39 year old, patient of Dr. Morales.   Notes: Patient is aware this is an earlier lining check, plan was to start DAVID on 1/23 for FET on 1/28   ** PATIENT NEEDS ANESTHESIA WITH FET     Here for US and/or lab monitoring; relevant findings reviewed.    Patient did not stay for discussion after monitoring,  Team will contact patient later today with results and plan.    Nava Pereira  01/17/2025  9:15 AM          LVM with patient to continue oral estrace 6mg daily and start vaginal estrace tomorrow 1/28 2mg BID and return to clinic for a repeat lining check on 1/21.   Patient told to call office back to schedule this.   Nava Pereira 01/17/25 1:32 PM

## 2025-01-20 ENCOUNTER — APPOINTMENT (OUTPATIENT)
Dept: ENDOCRINOLOGY | Facility: CLINIC | Age: 40
End: 2025-01-20
Payer: COMMERCIAL

## 2025-01-20 DIAGNOSIS — N97.9 FEMALE INFERTILITY: ICD-10-CM

## 2025-01-21 ENCOUNTER — SPECIALTY PHARMACY (OUTPATIENT)
Dept: PHARMACY | Facility: CLINIC | Age: 40
End: 2025-01-21

## 2025-01-21 ENCOUNTER — ANCILLARY PROCEDURE (OUTPATIENT)
Dept: ENDOCRINOLOGY | Facility: CLINIC | Age: 40
End: 2025-01-21
Payer: COMMERCIAL

## 2025-01-21 ENCOUNTER — LAB (OUTPATIENT)
Dept: LAB | Facility: LAB | Age: 40
End: 2025-01-21
Payer: COMMERCIAL

## 2025-01-21 ENCOUNTER — TELEPHONE (OUTPATIENT)
Dept: ENDOCRINOLOGY | Facility: CLINIC | Age: 40
End: 2025-01-21

## 2025-01-21 ENCOUNTER — PHARMACY VISIT (OUTPATIENT)
Dept: PHARMACY | Facility: CLINIC | Age: 40
End: 2025-01-21
Payer: COMMERCIAL

## 2025-01-21 DIAGNOSIS — N97.9 FEMALE INFERTILITY: ICD-10-CM

## 2025-01-21 LAB
ESTRADIOL SERPL-MCNC: 4296 PG/ML
PROGEST SERPL-MCNC: 0.4 NG/ML

## 2025-01-21 PROCEDURE — 84144 ASSAY OF PROGESTERONE: CPT

## 2025-01-21 PROCEDURE — 82670 ASSAY OF TOTAL ESTRADIOL: CPT

## 2025-01-21 PROCEDURE — 76857 US EXAM PELVIC LIMITED: CPT

## 2025-01-21 NOTE — PROGRESS NOTES
CYCLING NOTE - REPEAT LINING CHECK  Cycle #: 1  Reason For Treatment: Male Infertility  Protocol: Programmed FET with baby ASA 81mg to be started at time of transfer, doxycycline 100mg BID x 4 days   Day of stim: CD 19 - added in vaginal estrace on 1/18  Patient Hx: 39 year old, patient of Dr. Morales.   Notes: Plan is to start DAVID on 1/23 for FET on 1/28  ** PATIENT NEEDS ANESTHESIA WITH FET     Here for US and/or lab monitoring; relevant findings reviewed.    Patient stayed for nurse visit. Pain is 0/10 and Progesterone teaching completed with patient , injection sites marked. Patient would be willing to come back on 1/23 before starting DAVID, but is unsure if she has to cancel if she would want to proceed with another FET.   Team will contact patient later today with results and plan.    Nava Pereira  01/21/2025  8:57 AM      Called patient and LVM with plan. Patient told to continue oral and vaginal estrace and repeat lining on 1/23. She was told to bring DAVID to appt in case she is ok to proceed and start DAVID that morning.   Nava Pereira 01/21/25 1:32 PM

## 2025-01-21 NOTE — TELEPHONE ENCOUNTER
Returned patient's call. Patient was here for repeat lining check and plan was to continue on both oral and vaginal estrace and return on 1/23 for repeat lining check and labs. Patient would like to discuss with Dr. Morales and get her recommendations for this cycle. If this cycle was to be cancelled, they would prefer to not go through another transfer cycle, so they just want what will  be best.     Message sent to Dr. Morales to see if she is able to reach out to patient today or tomorrow (patient aware Dr. Morales is out of office today)     Nava Pereira 01/21/25 3:58 PM

## 2025-01-22 ENCOUNTER — LAB (OUTPATIENT)
Dept: LAB | Facility: LAB | Age: 40
End: 2025-01-22
Payer: COMMERCIAL

## 2025-01-22 ENCOUNTER — OFFICE VISIT (OUTPATIENT)
Dept: PRIMARY CARE | Facility: CLINIC | Age: 40
End: 2025-01-22
Payer: COMMERCIAL

## 2025-01-22 VITALS
HEART RATE: 70 BPM | OXYGEN SATURATION: 97 % | WEIGHT: 141 LBS | DIASTOLIC BLOOD PRESSURE: 80 MMHG | HEIGHT: 65 IN | BODY MASS INDEX: 23.49 KG/M2 | SYSTOLIC BLOOD PRESSURE: 118 MMHG

## 2025-01-22 DIAGNOSIS — Z13.6 SCREENING FOR CARDIOVASCULAR CONDITION: ICD-10-CM

## 2025-01-22 DIAGNOSIS — Z00.00 PHYSICAL EXAM: Primary | ICD-10-CM

## 2025-01-22 DIAGNOSIS — J45.990 EXERCISE-INDUCED ASTHMA (HHS-HCC): ICD-10-CM

## 2025-01-22 LAB
CHOLEST SERPL-MCNC: 159 MG/DL (ref 0–199)
CHOLEST/HDLC SERPL: 2 {RATIO}
HDLC SERPL-MCNC: 78.4 MG/DL
LDLC SERPL CALC-MCNC: 65 MG/DL
NON HDL CHOLESTEROL: 81 MG/DL (ref 0–149)
TRIGL SERPL-MCNC: 78 MG/DL (ref 0–149)
VLDL: 16 MG/DL (ref 0–40)

## 2025-01-22 PROCEDURE — 80061 LIPID PANEL: CPT

## 2025-01-22 PROCEDURE — 99395 PREV VISIT EST AGE 18-39: CPT | Performed by: PHYSICIAN ASSISTANT

## 2025-01-22 PROCEDURE — 3008F BODY MASS INDEX DOCD: CPT | Performed by: PHYSICIAN ASSISTANT

## 2025-01-22 RX ORDER — ALBUTEROL SULFATE 90 UG/1
2 INHALANT RESPIRATORY (INHALATION) EVERY 4 HOURS PRN
Qty: 18 G | Refills: 3 | Status: SHIPPED | OUTPATIENT
Start: 2025-01-22

## 2025-01-22 ASSESSMENT — PATIENT HEALTH QUESTIONNAIRE - PHQ9
2. FEELING DOWN, DEPRESSED OR HOPELESS: NOT AT ALL
1. LITTLE INTEREST OR PLEASURE IN DOING THINGS: NOT AT ALL
SUM OF ALL RESPONSES TO PHQ9 QUESTIONS 1 AND 2: 0

## 2025-01-22 ASSESSMENT — PAIN SCALES - GENERAL: PAINLEVEL_OUTOF10: 0-NO PAIN

## 2025-01-22 NOTE — PROGRESS NOTES
"Subjective   Patient ID: Taty Sosa is a 39 y.o. female who presents for Annual Exam.    Presents for routine exam.     Denies any new concerns at this time Requesting albuterol refill for EIA.     Recent ED visit 6 weeks ago for colitis since resolved, has GI follow up in 1 month.     PMH, FH reviewed.          Review of Systems   All other systems reviewed and are negative.      Objective   /80   Pulse 70   Ht 1.651 m (5' 5\")   Wt 64 kg (141 lb)   SpO2 97%   BMI 23.46 kg/m²     Physical Exam  Constitutional:       Appearance: Normal appearance.   HENT:      Right Ear: Tympanic membrane normal.      Left Ear: Tympanic membrane normal.      Mouth/Throat:      Pharynx: Oropharynx is clear.   Eyes:      Pupils: Pupils are equal, round, and reactive to light.   Cardiovascular:      Rate and Rhythm: Normal rate and regular rhythm.      Heart sounds: Normal heart sounds.   Pulmonary:      Breath sounds: Normal breath sounds.   Neurological:      Mental Status: She is alert.         Assessment/Plan   Diagnoses and all orders for this visit:  Physical exam  Exercise-induced asthma ([facility])  -     albuterol 90 mcg/actuation inhaler; Inhale 2 puffs every 4 hours if needed for wheezing or shortness of breath.  Screening for cardiovascular condition  -     Lipid panel; Future    Annual follow up recommended.      "

## 2025-01-23 ENCOUNTER — ANCILLARY PROCEDURE (OUTPATIENT)
Dept: ENDOCRINOLOGY | Facility: CLINIC | Age: 40
End: 2025-01-23
Payer: COMMERCIAL

## 2025-01-23 DIAGNOSIS — N97.9 FEMALE INFERTILITY: ICD-10-CM

## 2025-01-23 PROCEDURE — 76857 US EXAM PELVIC LIMITED: CPT

## 2025-01-23 RX ORDER — MEDROXYPROGESTERONE ACETATE 10 MG/1
10 TABLET ORAL DAILY
Qty: 10 TABLET | Refills: 0 | Status: SHIPPED | OUTPATIENT
Start: 2025-01-23 | End: 2026-01-23

## 2025-01-23 NOTE — PROGRESS NOTES
CYCLING NOTE - Repeat Lining check  Cycle #: 1  Reason For Treatment: Male Infertility  Protocol: Programmed FET with baby ASA 81mg to be started at time of transfer, doxycycline 100mg BID x 4 days   Day of stim: CD 21- added in vaginal estrace on 1/18  Patient Hx: 39 year old, patient of Dr. Morales.   Notes: Plan is to start DAVID on 1/23 for FET on 1/28  ** PATIENT NEEDS ANESTHESIA WITH FET     Here for US and/or lab monitoring; relevant findings reviewed.    Patient stayed for nurse visit. Pain is 0/10  Team will contact patient later today with results and plan.    Nava Pereira  01/23/2025  8:20 AM      Patient received phone call from Dr. Rubio with Dr. Morales's recommendations that lining is not optimal and to not proceed with FET. Patient stated she does not want to proceed with another transfer set up at this time, and will discuss with her partner and let us know before disposing her embryos.   Sent in provera 10mg x 10 days to start today to induce withdrawal bleed.     Nava Pereira 01/23/25 1:24 PM

## 2025-01-27 ENCOUNTER — DOCUMENTATION (OUTPATIENT)
Dept: ENDOCRINOLOGY | Facility: CLINIC | Age: 40
End: 2025-01-27
Payer: COMMERCIAL

## 2025-01-27 NOTE — Clinical Note
@Lab- are her embryos here? If so, can you pls send her the dispo paperwork? @Rns/Camilo- please see as KATHY.

## 2025-01-28 NOTE — PROGRESS NOTES
Call to patient today regarding cancelled cycle. Reviewed that her lining was much thicker during her IVF stim and that this is a good sign that we can get back to this thickness with another attempt and another protocol. Suggested a patch protocol or a stimulated FET cycle. She declined.   I also discussed with her the thought process behind her expressed desire to discard her embryos at this juncture. I advised that I have never had a patient make this choice in 15 years of practice and that I am concerned that, although ultimately we would honor her wishes to discard, she is at risk of having regret if she elects this choice. Reviewed embryo discard is a permanent decision and that once discarded, we cannot get back this opportunity to conceive. We may not be able to stimulate her in the future as well as she stimulated this time. Reviewed that these are good quality embryos with a high chance of success. Advised that she can also transfer her embryos to another clinic if she wishes to pursue treatment elsewhere.   She stated consistently during the call that this process and the timeline has been too difficult for her mental health and that she would rather prioritize her timeline for becoming pregnant (which has now passed) to get closure more so than the opportunity to transfer these embryos next month or in the future and potentially become pregnant.   She again stated her desire to discard her embryos.   I will look into whether her embryos are currently here at  or at [facility]. If they are at [facility], she can make disposition decisions with them. If they are here at , we will send her the disposition paperwork.   Tiffany Morales 01/27/25 9:05 PM

## 2025-01-30 ENCOUNTER — PATIENT MESSAGE (OUTPATIENT)
Dept: ENDOCRINOLOGY | Facility: CLINIC | Age: 40
End: 2025-01-30
Payer: COMMERCIAL

## 2025-01-31 ENCOUNTER — TELEPHONE (OUTPATIENT)
Dept: ENDOCRINOLOGY | Facility: CLINIC | Age: 40
End: 2025-01-31
Payer: COMMERCIAL

## 2025-01-31 DIAGNOSIS — N97.9 FEMALE INFERTILITY: ICD-10-CM

## 2025-01-31 DIAGNOSIS — Z31.83 ENCOUNTER FOR ASSISTED REPRODUCTIVE FERTILITY CYCLE: ICD-10-CM

## 2025-01-31 RX ORDER — MEDROXYPROGESTERONE ACETATE 10 MG/1
10 TABLET ORAL DAILY
Qty: 10 TABLET | Refills: 0 | Status: SHIPPED | OUTPATIENT
Start: 2025-01-31 | End: 2025-01-31 | Stop reason: SDUPTHER

## 2025-01-31 RX ORDER — MEDROXYPROGESTERONE ACETATE 10 MG/1
10 TABLET ORAL DAILY
Qty: 7 TABLET | Refills: 0 | Status: SHIPPED | OUTPATIENT
Start: 2025-01-31 | End: 2026-01-31

## 2025-01-31 NOTE — TELEPHONE ENCOUNTER
Returned patient's call. Patient wanted to clarify plan for another transfer.     She was under the impression previously that her lining was not ever going to get much thicker than where it was during FET set up (~5mm). After speaking with Dr. Morales and realizing her lining did get closer to 7, she does want to proceed another time with a transfer and do whatever is recommended to give her best chance of success.     Dr. Morales would like to meet with patient and partner to have a set and clear plan of protocol and recommendations for proceeding vs cancellations if it got to that point.     Patient will stay on provera (ordered new Rx for 7 days) until they can meet with Dr. Morales so she can delay her cycle start.     Patient agrees and understanding of plan.     Nava Pereira 01/31/25 4:41 PM

## 2025-02-04 ENCOUNTER — DOCUMENTATION (OUTPATIENT)
Dept: ENDOCRINOLOGY | Facility: CLINIC | Age: 40
End: 2025-02-04
Payer: COMMERCIAL

## 2025-02-04 RX ORDER — PROPYLENE GLYCOL/PEG 400 0.3 %-0.4%
DROPS OPHTHALMIC (EYE)
Qty: 30 EACH | Refills: 3 | Status: CANCELLED | OUTPATIENT
Start: 2025-02-04 | End: 2025-05-05

## 2025-02-04 RX ORDER — LIDOCAINE AND PRILOCAINE 25; 25 MG/G; MG/G
CREAM TOPICAL DAILY
Qty: 30 G | Refills: 2 | Status: CANCELLED | OUTPATIENT
Start: 2025-02-04 | End: 2026-02-04

## 2025-02-04 RX ORDER — PROGESTERONE 50 MG/ML
75 INJECTION, SOLUTION INTRAMUSCULAR DAILY
Qty: 50 ML | Refills: 3 | Status: CANCELLED | OUTPATIENT
Start: 2025-02-04 | End: 2026-02-04

## 2025-02-04 NOTE — TELEPHONE ENCOUNTER
Reason for call:   Notes: pt states that her period started even on the meds that was supposed to stop it and wants a call back

## 2025-02-04 NOTE — TELEPHONE ENCOUNTER
Called patient after reviewing plan with Dr. Morales.     Patient will proceed with Stimulated FET.   Patient was on provera, and started bleeding full flow today. She will stop provera, and come in for baseline tomorrow for possible FSH start on 2/6.     Medication orders placed and pended to Dr. Morales to sign off to go to [facility], along with boarding pass to addend with new protocol.   Patient will call tomorrow to get medications delivered.     Patient would prefer DAVID instead of compounded prometrium - she still has this at home.     Patient aware to call first thing tomorrow morning to schedule appointment, as  is gone for today. Will also send message so they are aware.     Nava Pereira 02/04/25 4:17 PM

## 2025-02-04 NOTE — PROGRESS NOTES
Called to review with patient    They want to try one more time to get the lining thicker but would not do another transfer attempt after this.   Reviewed her message and she acknowledged that nothing in what we do is set in stone.     Fertility Plan Update:  WILL NOT CANCEL WITHOUT TALKING WITH DR POOLE  Stimulated FET: Gonal F 100 Ius daily- will only transfer with a lining >6.   She may prefer DAVID to vaginal supp. Will check progesterone levels every three days to ensure >17.     Reviewed my concerns about whether she can be ok with a failed transfer or an ectopic or biochemical or SAB. They feel they are in a better place. Ok to proceed.     Tiffany Poole 02/04/25 3:58 PM

## 2025-02-05 ENCOUNTER — ANCILLARY PROCEDURE (OUTPATIENT)
Dept: ENDOCRINOLOGY | Facility: CLINIC | Age: 40
End: 2025-02-05
Payer: COMMERCIAL

## 2025-02-05 ENCOUNTER — PATIENT MESSAGE (OUTPATIENT)
Dept: ENDOCRINOLOGY | Facility: CLINIC | Age: 40
End: 2025-02-05

## 2025-02-05 ENCOUNTER — LAB REQUISITION (OUTPATIENT)
Dept: LAB | Facility: HOSPITAL | Age: 40
End: 2025-02-05

## 2025-02-05 DIAGNOSIS — N97.9 FEMALE INFERTILITY: ICD-10-CM

## 2025-02-05 DIAGNOSIS — N97.9 FEMALE INFERTILITY, UNSPECIFIED: ICD-10-CM

## 2025-02-05 LAB — ESTRADIOL SERPL-MCNC: 358 PG/ML

## 2025-02-05 PROCEDURE — 83002 ASSAY OF GONADOTROPIN (LH): CPT | Mod: OUT | Performed by: NURSE PRACTITIONER

## 2025-02-05 PROCEDURE — 82670 ASSAY OF TOTAL ESTRADIOL: CPT | Mod: OUT | Performed by: NURSE PRACTITIONER

## 2025-02-05 PROCEDURE — 84144 ASSAY OF PROGESTERONE: CPT

## 2025-02-05 PROCEDURE — 76857 US EXAM PELVIC LIMITED: CPT

## 2025-02-05 PROCEDURE — 82670 ASSAY OF TOTAL ESTRADIOL: CPT

## 2025-02-05 PROCEDURE — 83002 ASSAY OF GONADOTROPIN (LH): CPT

## 2025-02-05 PROCEDURE — 84144 ASSAY OF PROGESTERONE: CPT | Mod: OUT | Performed by: NURSE PRACTITIONER

## 2025-02-05 RX ORDER — LIDOCAINE AND PRILOCAINE 25; 25 MG/G; MG/G
CREAM TOPICAL DAILY
Qty: 30 G | Refills: 2 | Status: SHIPPED | OUTPATIENT
Start: 2025-02-05 | End: 2026-02-05

## 2025-02-05 RX ORDER — PROPYLENE GLYCOL/PEG 400 0.3 %-0.4%
1 DROPS OPHTHALMIC (EYE) DAILY
Qty: 30 EACH | Refills: 3 | Status: SHIPPED | OUTPATIENT
Start: 2025-02-05

## 2025-02-05 RX ORDER — GANIRELIX ACETATE 250 UG/.5ML
250 INJECTION, SOLUTION SUBCUTANEOUS EVERY MORNING
Qty: 5 EACH | Refills: 2 | Status: SHIPPED | OUTPATIENT
Start: 2025-02-05

## 2025-02-05 RX ORDER — CHORIONIC GONADOTROPIN 10000 UNIT
10000 KIT INTRAMUSCULAR ONCE AS NEEDED
Qty: 1 EACH | Refills: 0 | Status: SHIPPED | OUTPATIENT
Start: 2025-02-05

## 2025-02-05 RX ORDER — PROGESTERONE 50 MG/ML
75 INJECTION, SOLUTION INTRAMUSCULAR DAILY
Qty: 30 ML | Refills: 2 | Status: SHIPPED | OUTPATIENT
Start: 2025-02-05 | End: 2026-02-05

## 2025-02-05 NOTE — PROGRESS NOTES
Requested Prescriptions     Signed Prescriptions Disp Refills    follitropin swapna (Gonal-f) 300/0.5 unit/mL pen injector injection 3 each 2     Sig: Inject 100 Units under the skin once daily in the evening. Inject under the skin as directed by provider         RX received for Follistim. Cancelled out and sent in Gonal F.       Adelaide Lund (Katie), PharmOMID  [facility]  Clinical Pharmacy Specialist- Fertility   [facility], [facility]  [address] [place] [address]  Email: [email or web address]  Tel: [phone]       Fax: [phone]

## 2025-02-05 NOTE — PROGRESS NOTES
CYCLING NOTE    Here for US and/or lab monitoring; relevant findings reviewed.    39yr old patient of Dr. Poole - [facility] 1.8. Here today for baseline scan for stimulated FET, LMP 2/4/25 after taking Provera starting on 1/23.  Fertility Plan Update:  WILL NOT CANCEL WITHOUT TALKING WITH DR POOLE  Stimulated FET: Gonal F 100 Ius daily- will only transfer with a lining >6.   She may prefer DAVID to vaginal supp. Will check progesterone levels every three days to ensure >17.    Patient stayed for nurse visit. Pain is 0/10  Team will contact patient later today with results and plan.    Nabila Powell  02/05/2025  8:39 AM  ====    Patient with follicle and elevated E2. Will offer OCP vs waiting vs triggering follicle.     Michelle Forte 02/05/25 1:42 PM    Telephone call made to patient, voicemail box received. Detailed voicemail left for patient and detailed MyChart sent to patient with above MD recommendations.    02/05/25 at 2:12 PM - Nabila Powell RN

## 2025-02-06 ENCOUNTER — DOCUMENTATION (OUTPATIENT)
Dept: ENDOCRINOLOGY | Facility: CLINIC | Age: 40
End: 2025-02-06

## 2025-02-06 ENCOUNTER — SPECIALTY PHARMACY (OUTPATIENT)
Dept: PHARMACY | Facility: CLINIC | Age: 40
End: 2025-02-06

## 2025-02-06 RX ORDER — CHORIONIC GONADOTROPIN 10000 UNIT
10000 KIT INTRAMUSCULAR ONCE AS NEEDED
Qty: 1 EACH | Refills: 0 | Status: SHIPPED | OUTPATIENT
Start: 2025-02-06

## 2025-02-06 RX ORDER — GANIRELIX ACETATE 250 UG/.5ML
250 INJECTION, SOLUTION SUBCUTANEOUS EVERY MORNING
Qty: 5 EACH | Refills: 2 | Status: SHIPPED | OUTPATIENT
Start: 2025-02-06

## 2025-02-06 NOTE — PROGRESS NOTES
Patient was here yesterday for baseline for stimulated FET. There was large follicle seen with elevated E2. Patient was given option of triggering the follicle and re-baselining, or going on OCPs and rebaseline in 10-14 days.   Patient would like to do which ever would have her come in sooner, and take less time.     Patient also wants to confirm if it is ok for her to use lidocaine cream for any subcutaneous FSH injections during stim.     Nava Pereira 02/06/25 10:03 AM        Patient will trigger tonight and check p4 level on 2/13 to confirm ovulation prior to re-baselining.   Nava Pereira 02/06/25 2:08 PM

## 2025-02-07 ENCOUNTER — TELEPHONE (OUTPATIENT)
Dept: ENDOCRINOLOGY | Facility: CLINIC | Age: 40
End: 2025-02-07
Payer: COMMERCIAL

## 2025-02-07 DIAGNOSIS — Z31.83 ENCOUNTER FOR ASSISTED REPRODUCTIVE FERTILITY CYCLE: ICD-10-CM

## 2025-02-07 LAB
LH SERPL-ACNC: 4.7 IU/L
PROGEST SERPL-MCNC: 0.4 NG/ML

## 2025-02-07 NOTE — TELEPHONE ENCOUNTER
Reason for call: Call Back  Notes: Pt called to schedule lining check and labs for 2/13 but no message sent to FD. Can someone confirm and I'll call the patient back?

## 2025-02-08 DIAGNOSIS — Z31.83 ENCOUNTER FOR ASSISTED REPRODUCTIVE FERTILITY CYCLE: ICD-10-CM

## 2025-02-10 NOTE — TREATMENT PLAN
Medications ordered for upcoming FET.     Patient is being seen by the  Fertility clinic for the following:     Treatment plan:   FET #2  Reasons for treatment: Male Infertility  Treatment start date: 2/4/2025  Treatment type: ART  ART procedures: Transfer       Treatment type: ART      Called patient after reviewing plan with Dr. Morales.      Patient will proceed with Stimulated FET.   Patient was on provera, and started bleeding full flow today. She will stop provera, and come in for baseline tomorrow for possible FSH start on 2/6.      Medication orders placed and pended to Dr. Morales to sign off to go to [facility], along with boarding pass to addend with new protocol.   Patient will call tomorrow to get medications delivered.      Patient would prefer DAVID instead of compounded prometrium - she still has this at home.      Patient aware to call first thing tomorrow morning to schedule appointment, as  is gone for today. Will also send message so they are aware.      Nava LITTLE Kelli 02/04/25 4:17 PM            The following medications were sent into  Specialty Pharmacy on 2/6/25 for the above treatment:   Gonal-f 300 unit Redi-Ject pen, Ganirelix 250mcg syringes, and Pregnyl 10,000IU vial for trigger         Adelaide Lund, PharmD      Adelaide Lund (Katie), Joi  [facility]  Clinical Pharmacy Specialist- Fertility   [facility], Michelle Kate  [address] [place] [address]  Email: [email or web address]  Tel: [phone]       Fax: [phone]

## 2025-02-13 ENCOUNTER — LAB REQUISITION (OUTPATIENT)
Dept: LAB | Facility: HOSPITAL | Age: 40
End: 2025-02-13
Payer: COMMERCIAL

## 2025-02-13 DIAGNOSIS — Z31.83 ENCOUNTER FOR ASSISTED REPRODUCTIVE FERTILITY PROCEDURE CYCLE: ICD-10-CM

## 2025-02-13 LAB — PROGEST SERPL-MCNC: 14.8 NG/ML

## 2025-02-13 PROCEDURE — 84144 ASSAY OF PROGESTERONE: CPT

## 2025-02-13 NOTE — PROGRESS NOTES
Patient was here on 2/5 for baseline for stimulated FET. There was large follicle seen with elevated E2, so patient triggered follicle and is back today to check p4 level to confirm ovulation prior to re-base lining.   Component      Latest Ref Rng 2/13/2025   Progesterone      ng/mL 14.8        Nava Pereira 02/13/25 7:34 AM  ====  [facility] PROVIDER NOTE- PROGESTERONE CHECK  Ultrasound and/or labs reviewed at [facility].   Results for orders placed or performed in visit on 02/13/25 (from the past 96 hours)   Progesterone   Result Value Ref Range    Progesterone 14.8 ng/mL     *Note: Due to a large number of results and/or encounters for the requested time period, some results have not been displayed. A complete set of results can be found in Results Review.       Ovulatory progesterone  yes    Next steps:  will rebaseline with menses    Michelle Mason  02/13/2025  12:39 PM

## 2025-02-19 ENCOUNTER — OFFICE VISIT (OUTPATIENT)
Dept: GASTROENTEROLOGY | Facility: CLINIC | Age: 40
End: 2025-02-19
Payer: COMMERCIAL

## 2025-02-19 VITALS
TEMPERATURE: 97.7 F | OXYGEN SATURATION: 97 % | BODY MASS INDEX: 24.13 KG/M2 | DIASTOLIC BLOOD PRESSURE: 79 MMHG | SYSTOLIC BLOOD PRESSURE: 129 MMHG | HEART RATE: 88 BPM | RESPIRATION RATE: 20 BRPM | WEIGHT: 145 LBS

## 2025-02-19 DIAGNOSIS — K59.09 CHRONIC CONSTIPATION: ICD-10-CM

## 2025-02-19 DIAGNOSIS — R19.7 BLOODY DIARRHEA: Primary | ICD-10-CM

## 2025-02-19 DIAGNOSIS — K21.9 GASTROESOPHAGEAL REFLUX DISEASE, UNSPECIFIED WHETHER ESOPHAGITIS PRESENT: ICD-10-CM

## 2025-02-19 DIAGNOSIS — N97.9 FEMALE INFERTILITY: ICD-10-CM

## 2025-02-19 PROCEDURE — RXMED WILLOW AMBULATORY MEDICATION CHARGE

## 2025-02-19 PROCEDURE — 99213 OFFICE O/P EST LOW 20 MIN: CPT | Performed by: NURSE PRACTITIONER

## 2025-02-19 RX ORDER — CHORIONIC GONADOTROPIN 10000 UNIT
10000 KIT INTRAMUSCULAR ONCE AS NEEDED
Qty: 1 EACH | Refills: 0 | Status: SHIPPED | OUTPATIENT
Start: 2025-02-19

## 2025-02-19 RX ORDER — GANIRELIX ACETATE 250 UG/.5ML
250 INJECTION, SOLUTION SUBCUTANEOUS EVERY MORNING
Qty: 5 EACH | Refills: 2 | Status: SHIPPED | OUTPATIENT
Start: 2025-02-19

## 2025-02-19 ASSESSMENT — ENCOUNTER SYMPTOMS
ARTHRALGIAS: 0
FREQUENCY: 0
COUGH: 0
MYALGIAS: 0
HEMATURIA: 0
PHOTOPHOBIA: 0
FLANK PAIN: 0
DIAPHORESIS: 0
SORE THROAT: 0
DYSURIA: 0
HEADACHES: 0
AGITATION: 0
FATIGUE: 0
CHILLS: 0
JOINT SWELLING: 0
NERVOUS/ANXIOUS: 0
WHEEZING: 0
PALPITATIONS: 0
NUMBNESS: 0
ADENOPATHY: 0
DIZZINESS: 0
EYE PAIN: 0
WEAKNESS: 0
LIGHT-HEADEDNESS: 0
SHORTNESS OF BREATH: 0
FEVER: 0
HALLUCINATIONS: 0
BACK PAIN: 0

## 2025-02-19 ASSESSMENT — PAIN SCALES - GENERAL: PAINLEVEL_OUTOF10: 0-NO PAIN

## 2025-02-19 NOTE — PATIENT INSTRUCTIONS
Thanks for coming to the GI clinic.     Glad you are feeling better!    Continue TUMS as needed.     Continue Miralax as needed.     I recommend a routine screening colonoscopy in 2034.     Follow up in clinic as needed.

## 2025-02-19 NOTE — TELEPHONE ENCOUNTER
Telephone call made to patient in regards to patients MyChart message. Patient confirmed LMP as 2/19. Patient to come in to clinic tomorrow for baseline scans and lab work. Patient agreeable. Patient transferred to front for scheduling.       02/19/25 at 3:21 PM - Wicho Leblanc RN

## 2025-02-19 NOTE — PROGRESS NOTES
"Subjective   Patient ID: Helen Sosa \"helen sosa\" is a 39 y.o. female who presents for Follow-up.    This is a 39 year old WF with an extensive history which includes anxiety/depression, PTSD,  GERD with positive pH impedence testing, asthma, CHUCK on CPAP, migraine headaches, RLS, and inappropriate sinus tachycardia who is presenting to the GI clinic for follow up. Last seen in the GI clinic on 12/6/24.     History per pt and review of EMR    Interval history:     Stool pathogen panel and stool O/P negative 12/6/24. Cdiff PCR unable to be sent as formed stool collected.     Underwent EGD/colonoscopy on 12/24/24 with Dr. Avery. EGD normal. Colonoscopy noted hypertrophied anal papillae, otherwise normal. See random biopsies as below:     FINAL DIAGNOSIS   A.  Stomach biopsy:   --Gastric body type mucosa with minimal chronic inflammatory cell infiltration   --Negative for intestinal metaplasia or dysplasia   --No H. pylori-like microorganisms are identified on routine H&E stained sections      B.  Esophagus biopsy:   --Fragments of squamous mucosa with reactive change   --No diagnostic features of eosinophilic esophagitis seen   --No dysplasia seen      C.  Ascending colon biopsy:   --Fragments of colonic mucosa with reactive change   --No diagnostic features of active colitis or microscopic colitis seen   --No dysplasia seen      D.  Descending colon biopsy:   --Fragments of colonic mucosa with reactive change   --No diagnostic features of active colitis or microscopic colitis seen   --No dysplasia seen     Reports heartburn and regurgitation which are controlled with TUMS (occurs with overeating). She tries to eat smaller portions.     Constipation controlled with occasional use of Miralax.     Bloody diarrhea has completely resolved.     Denies unintentional weight loss, abdominal pain, vomiting, diarrhea, hematemesis, hematochezia, and melena.       Review of Systems   Constitutional:  Negative for " chills, diaphoresis, fatigue and fever.   HENT:  Negative for congestion, ear pain, hearing loss, sneezing and sore throat.    Eyes:  Negative for photophobia, pain and visual disturbance.   Respiratory:  Negative for cough, shortness of breath and wheezing.    Cardiovascular:  Negative for chest pain, palpitations and leg swelling.   Endocrine: Negative for cold intolerance and heat intolerance.   Genitourinary:  Negative for dysuria, flank pain, frequency and hematuria.   Musculoskeletal:  Negative for arthralgias, back pain, gait problem, joint swelling and myalgias.   Skin:  Negative for rash.   Neurological:  Negative for dizziness, syncope, weakness, light-headedness, numbness and headaches.   Hematological:  Negative for adenopathy.   Psychiatric/Behavioral:  Negative for agitation and hallucinations. The patient is not nervous/anxious.        Allergies   Allergen Reactions    Adhesive Tape-Silicones Rash and Other     Redness - Irritation     Current Outpatient Medications   Medication Sig Dispense Refill    albuterol 90 mcg/actuation inhaler Inhale 2 puffs every 4 hours if needed for wheezing or shortness of breath. 18 g 3    calcium carbonate (Tums) 200 mg calcium chewable tablet Chew 1 tablet (500 mg) if needed for indigestion or heartburn.      loratadine (Claritin) 10 mg tablet Take 1 tablet (10 mg) by mouth once daily.      polyethylene glycol (Miralax) 17 gram/dose powder Mix 17 g of powder and drink if needed for constipation.      chorionic gonadotropin (Pregnyl) 10,000 unit injection Reconstitute according to instructions and inject 10,000 units (1 mL) under the skin as a one time dose, as directed per provider for trigger. 1 each 0    follitropin swapna (Gonal-f) 300/0.5 unit/mL pen injector injection Inject 100 Units under the skin once daily in the evening. Inject under the skin as directed by provider. 3 each 2    ganirelix (Orgalutran) 250 mcg/0.5 mL syringe injection Inject 250 mcg (1 syringe)  "under the skin once daily as directed per provider. 5 each 2    lidocaine-prilocaine (Emla) 2.5-2.5 % cream Apply to treatment area 1 hour prior to injection once daily. (Patient not taking: Reported on 2/19/2025) 30 g 2    lidocaine-prilocaine (Emla) 2.5-2.5 % cream Apply topically once daily. Apply to treatment area 1 hour prior to injection. (Patient not taking: Reported on 2/19/2025) 30 g 2    progesterone 50 mg/mL injection Inject 1.5 mL (75 mg) into the muscle once daily. Inject into the muscle at the same time each morning as directed per provider. (Patient not taking: Reported on 2/19/2025) 50 mL 3    progesterone 50 mg/mL injection Inject 1.5 mL (75 mg) into the muscle once daily. Inject into the muscle at the same time each morning as directed per provider. (Patient not taking: Reported on 2/19/2025) 30 mL 2    transparent dressings 2 3/8 X 2 3/4 \" bandage Use as directed to cover lidocaine cream. (Patient not taking: Reported on 2/19/2025) 30 each 3    transparent dressings 2 3/8 X 2 3/4 \" bandage Apply 1 each topically once daily. Use as directed to cover lidocaine cream. (Patient not taking: Reported on 2/19/2025) 30 each 3     No current facility-administered medications for this visit.        Objective     /79 (BP Location: Right arm, Patient Position: Sitting, BP Cuff Size: Adult)   Pulse 88   Temp 36.5 °C (97.7 °F) (Temporal)   Resp 20   Wt 65.8 kg (145 lb)   SpO2 97%   BMI 24.13 kg/m²     Physical Exam  Constitutional:       General: She is not in acute distress.     Appearance: Normal appearance.   HENT:      Head: Normocephalic and atraumatic.   Eyes:      Conjunctiva/sclera: Conjunctivae normal.   Cardiovascular:      Rate and Rhythm: Normal rate and regular rhythm.      Heart sounds: No murmur heard.     No gallop.   Pulmonary:      Effort: Pulmonary effort is normal.      Breath sounds: Normal breath sounds.   Abdominal:      General: Bowel sounds are normal. There is no " distension.      Tenderness: There is no abdominal tenderness. There is no guarding.   Musculoskeletal:         General: No swelling or deformity. Normal range of motion.      Cervical back: Normal range of motion. No rigidity.   Skin:     General: Skin is warm and dry.      Coloration: Skin is not jaundiced.      Findings: No lesion or rash.   Neurological:      General: No focal deficit present.      Mental Status: She is alert and oriented to person, place, and time.   Psychiatric:         Mood and Affect: Mood normal.         Assessment/Plan   Problem List Items Addressed This Visit    None  Visit Diagnoses       Bloody diarrhea    -  Primary    Gastroesophageal reflux disease, unspecified whether esophagitis present        Chronic constipation               Bloody diarrhea: resolved. With prior CT findings, suspect self limited colitis.     2. GERD:   - continue TUMS as needed    3. Chronic constipation:  - continue Miralax as needed    4. Colorectal cancer screening:  - recommend routine screening colonoscopy in 2034     5. Follow up:  - return to clinic as needed

## 2025-02-19 NOTE — PROGRESS NOTES
Requested Prescriptions     Signed Prescriptions Disp Refills    ganirelix (Orgalutran) 250 mcg/0.5 mL syringe injection 5 each 2     Sig: Inject 250 mcg (1 syringe) under the skin once daily as directed per provider.    follitropin swapna (Gonal-f) 300/0.5 unit/mL pen injector injection 3 each 2     Sig: Inject 100 Units under the skin once daily in the evening. Inject under the skin as directed by provider.    chorionic gonadotropin (Pregnyl) 10,000 unit injection 1 each 0     Sig: Reconstitute according to instructions and inject 10,000 units (1 mL) under the skin as a one time dose, as directed per provider for trigger.       Sent in medications for Stimulated FET to [facility]. Discontinued in error.       Adelaide Lund (Katie), PharmD  [facility]  Clinical Pharmacy Specialist- Fertility   [facility], [place] [address]  Email: [email or web address]  Tel: [phone]       Fax: [phone]

## 2025-02-20 ENCOUNTER — PHARMACY VISIT (OUTPATIENT)
Dept: PHARMACY | Facility: CLINIC | Age: 40
End: 2025-02-20
Payer: COMMERCIAL

## 2025-02-20 ENCOUNTER — LAB REQUISITION (OUTPATIENT)
Dept: LAB | Facility: HOSPITAL | Age: 40
End: 2025-02-20

## 2025-02-20 ENCOUNTER — ANCILLARY PROCEDURE (OUTPATIENT)
Dept: ENDOCRINOLOGY | Facility: CLINIC | Age: 40
End: 2025-02-20
Payer: COMMERCIAL

## 2025-02-20 ENCOUNTER — APPOINTMENT (OUTPATIENT)
Dept: OBSTETRICS AND GYNECOLOGY | Facility: CLINIC | Age: 40
End: 2025-02-20
Payer: COMMERCIAL

## 2025-02-20 ENCOUNTER — SPECIALTY PHARMACY (OUTPATIENT)
Dept: PHARMACY | Facility: CLINIC | Age: 40
End: 2025-02-20

## 2025-02-20 DIAGNOSIS — N97.9 FEMALE INFERTILITY: ICD-10-CM

## 2025-02-20 DIAGNOSIS — N97.9 FEMALE INFERTILITY, UNSPECIFIED: ICD-10-CM

## 2025-02-20 LAB — ESTRADIOL SERPL-MCNC: 73 PG/ML

## 2025-02-20 PROCEDURE — 82670 ASSAY OF TOTAL ESTRADIOL: CPT

## 2025-02-20 PROCEDURE — 76857 US EXAM PELVIC LIMITED: CPT

## 2025-02-20 NOTE — PROGRESS NOTES
Patient called with menses for FET setup.   LMP: 2/19/25  Protocol: Gonal F 100 Ius daily- will only transfer with a lining >6   TRANSFER UNDER ANESTHESIA!   Tentative lining check: 2/24  Tentative progesterone start: tbd  Tentative transfer date: tbd  Number of days of progesterone for transfer: tbd    Treatment plan confirmed: 12/27/2024  In waiver confirmed: 12/27/2024    Embryo # confirmed: 2 untested  Location of Embryo: on site  Embryo # to transfer: 2 untested    Boarding pass signed off:  Yes    Nava Pereira  02/20/2025  9:05 AM        LVM with patient to start  units tomorrow evening and to call back to schedule appt for 2/24.   Nava Pereira 02/20/25 1:30 PM

## 2025-02-21 ENCOUNTER — TELEPHONE (OUTPATIENT)
Dept: ENDOCRINOLOGY | Facility: CLINIC | Age: 40
End: 2025-02-21
Payer: COMMERCIAL

## 2025-02-21 NOTE — TELEPHONE ENCOUNTER
Called the patient she verified her name and date of birth I advised I made the corrections to her fmla as requested and refaxed the forms Patient verbalized an understanding    JEREMIAH ALVARENGA 02/21/25 7:32 AM

## 2025-02-23 DIAGNOSIS — N97.9 FEMALE INFERTILITY: ICD-10-CM

## 2025-02-24 ENCOUNTER — ANCILLARY PROCEDURE (OUTPATIENT)
Dept: ENDOCRINOLOGY | Facility: CLINIC | Age: 40
End: 2025-02-24
Payer: COMMERCIAL

## 2025-02-24 ENCOUNTER — LAB REQUISITION (OUTPATIENT)
Dept: LAB | Facility: HOSPITAL | Age: 40
End: 2025-02-24

## 2025-02-24 DIAGNOSIS — N97.9 FEMALE INFERTILITY: ICD-10-CM

## 2025-02-24 DIAGNOSIS — N97.9 FEMALE INFERTILITY, UNSPECIFIED: ICD-10-CM

## 2025-02-24 LAB
ESTRADIOL SERPL-MCNC: 135 PG/ML
LH SERPL-ACNC: 5 IU/L
PROGEST SERPL-MCNC: 0.8 NG/ML

## 2025-02-24 PROCEDURE — 76857 US EXAM PELVIC LIMITED: CPT

## 2025-02-24 PROCEDURE — 84144 ASSAY OF PROGESTERONE: CPT

## 2025-02-24 PROCEDURE — 82670 ASSAY OF TOTAL ESTRADIOL: CPT

## 2025-02-24 PROCEDURE — 83002 ASSAY OF GONADOTROPIN (LH): CPT

## 2025-02-24 NOTE — PROGRESS NOTES
CYCLING NOTE - STIMULATED LINING CHECK  Cycle #: 2 (1st stimulated)  Reason For Treatment: Male Infertility   Protocol: Gonal F 100 Ius daily- will only transfer with a lining >6   Day of stim: back after 3 days of FSH  Patient Hx: 39 year old, patient of Dr. Poole   Notes: TRANSFER UNDER ANESTHESIA!   WILL NOT CANCEL WITHOUT TALKING WITH DR POOLE       Here for US and/or lab monitoring; relevant findings reviewed.    Patient did not stay for discussion after monitoring,  Team will contact patient later today with results and plan.    Nava Pereira  02/24/2025  7:23 AM      [facility] for patient to continue same doses and return 2/26 for repeat lining check and labs.   Nava Pereira 02/24/25 1:08 PM     "Patient called me back and states that the waist measurement is 40\". Placed form in Dr Dc's office for Tuesday. ( patient did mention also that her weight went down some by increasing here exercise and cutting carbs).  Cindy Wright MA/  For Teams [employer]    "

## 2025-02-26 ENCOUNTER — LAB REQUISITION (OUTPATIENT)
Dept: LAB | Facility: HOSPITAL | Age: 40
End: 2025-02-26

## 2025-02-26 ENCOUNTER — OFFICE VISIT (OUTPATIENT)
Dept: PRIMARY CARE | Facility: CLINIC | Age: 40
End: 2025-02-26
Payer: COMMERCIAL

## 2025-02-26 ENCOUNTER — ANCILLARY PROCEDURE (OUTPATIENT)
Dept: ENDOCRINOLOGY | Facility: CLINIC | Age: 40
End: 2025-02-26
Payer: COMMERCIAL

## 2025-02-26 VITALS
HEART RATE: 63 BPM | HEIGHT: 65 IN | BODY MASS INDEX: 23.66 KG/M2 | WEIGHT: 142 LBS | OXYGEN SATURATION: 99 % | DIASTOLIC BLOOD PRESSURE: 80 MMHG | SYSTOLIC BLOOD PRESSURE: 122 MMHG

## 2025-02-26 DIAGNOSIS — R53.82 CHRONIC FATIGUE: ICD-10-CM

## 2025-02-26 DIAGNOSIS — N97.9 FEMALE INFERTILITY: ICD-10-CM

## 2025-02-26 DIAGNOSIS — F41.8 ANXIETY WITH DEPRESSION: Primary | ICD-10-CM

## 2025-02-26 DIAGNOSIS — E55.9 VITAMIN D DEFICIENCY: ICD-10-CM

## 2025-02-26 DIAGNOSIS — N97.9 FEMALE INFERTILITY, UNSPECIFIED: ICD-10-CM

## 2025-02-26 LAB
ESTRADIOL SERPL-MCNC: 301 PG/ML
LH SERPL-ACNC: 2.9 IU/L
PROGEST SERPL-MCNC: 0.5 NG/ML

## 2025-02-26 PROCEDURE — 3008F BODY MASS INDEX DOCD: CPT | Performed by: PHYSICIAN ASSISTANT

## 2025-02-26 PROCEDURE — 99214 OFFICE O/P EST MOD 30 MIN: CPT | Performed by: PHYSICIAN ASSISTANT

## 2025-02-26 PROCEDURE — 83002 ASSAY OF GONADOTROPIN (LH): CPT

## 2025-02-26 PROCEDURE — 84144 ASSAY OF PROGESTERONE: CPT

## 2025-02-26 PROCEDURE — 76857 US EXAM PELVIC LIMITED: CPT

## 2025-02-26 PROCEDURE — 82670 ASSAY OF TOTAL ESTRADIOL: CPT

## 2025-02-26 RX ORDER — ESCITALOPRAM OXALATE 10 MG/1
10 TABLET ORAL DAILY
Qty: 30 TABLET | Refills: 2 | Status: SHIPPED | OUTPATIENT
Start: 2025-02-26 | End: 2025-08-25

## 2025-02-26 ASSESSMENT — ENCOUNTER SYMPTOMS
SLEEP DISTURBANCE: 1
FATIGUE: 1
DYSPHORIC MOOD: 1
APPETITE CHANGE: 1
DEPRESSION: 1

## 2025-02-26 ASSESSMENT — PATIENT HEALTH QUESTIONNAIRE - PHQ9
7. TROUBLE CONCENTRATING ON THINGS, SUCH AS READING THE NEWSPAPER OR WATCHING TELEVISION: NEARLY EVERY DAY
3. TROUBLE FALLING OR STAYING ASLEEP OR SLEEPING TOO MUCH: NEARLY EVERY DAY
2. FEELING DOWN, DEPRESSED OR HOPELESS: NEARLY EVERY DAY
9. THOUGHTS THAT YOU WOULD BE BETTER OFF DEAD, OR OF HURTING YOURSELF: NOT AT ALL
6. FEELING BAD ABOUT YOURSELF - OR THAT YOU ARE A FAILURE OR HAVE LET YOURSELF OR YOUR FAMILY DOWN: NEARLY EVERY DAY
5. POOR APPETITE OR OVEREATING: NEARLY EVERY DAY
SUM OF ALL RESPONSES TO PHQ9 QUESTIONS 1 AND 2: 6
SUM OF ALL RESPONSES TO PHQ QUESTIONS 1-9: 22
4. FEELING TIRED OR HAVING LITTLE ENERGY: NEARLY EVERY DAY
1. LITTLE INTEREST OR PLEASURE IN DOING THINGS: NEARLY EVERY DAY
8. MOVING OR SPEAKING SO SLOWLY THAT OTHER PEOPLE COULD HAVE NOTICED. OR THE OPPOSITE, BEING SO FIGETY OR RESTLESS THAT YOU HAVE BEEN MOVING AROUND A LOT MORE THAN USUAL: SEVERAL DAYS

## 2025-02-26 ASSESSMENT — PAIN SCALES - GENERAL: PAINLEVEL_OUTOF10: 0-NO PAIN

## 2025-02-26 NOTE — PROGRESS NOTES
CYCLING NOTE    Here for US and/or lab monitoring; relevant findings reviewed.  Cycle #: 2 (1st stimulated)  Reason For Treatment: Male Infertility   Protocol: Stimulated FET - will only transfer with a lining >6   Day of stim: 6  Patient Hx: 39 year old, patient of Dr. Poole   Notes: TRANSFER UNDER ANESTHESIA!   WILL NOT CANCEL WITHOUT TALKING WITH DR POOLE     Patient did not stay for discussion after monitoring,  Team will contact patient later today with results and plan.    Wicho Leblanc  02/26/2025  7:57 AM      Telephone call made to patient with MD plan. Voicemail received. Detailed MyChart sent to patient. Patient to RTC on Friday 2/28 for continued monitoring.  made aware patient will be calling back to schedule.       02/26/25 at 1:27 PM - Wicho Leblanc RN

## 2025-02-26 NOTE — PROGRESS NOTES
"Subjective   Patient ID: Taty Sosa is a 39 y.o. female who presents for Depression.    Presents for concerns of recent depressive symptoms.  Has had onset of fatigue, poor motivation, anhedonia, and decreased interest in things the last few weeks.  He has had episodic depression in the past responsive to escitalopram.  She did start this within the last 3 days at 5 mg on her own which has helped some.  She has had counseling in the past with mixed results.  Since hospital discharge in December she has not had any major medical changes.  She is going through fertility treatment and investigation through the infertility clinic.  She denies any changes at work or any other life stress.  Work shifts remain the same.  She does have some appetite change and sleep disturbance.  Denies repeat bleeding and denies new supplements or medication.    Depression       Review of Systems   Constitutional:  Positive for appetite change and fatigue.   Psychiatric/Behavioral:  Positive for depression, dysphoric mood and sleep disturbance.        Objective   /80   Pulse 63   Ht 1.651 m (5' 5\")   Wt 64.4 kg (142 lb)   SpO2 99%   BMI 23.63 kg/m²     Physical Exam  Constitutional:       Appearance: Normal appearance.   HENT:      Mouth/Throat:      Pharynx: Oropharynx is clear.   Cardiovascular:      Rate and Rhythm: Normal rate and regular rhythm.   Pulmonary:      Breath sounds: Normal breath sounds.   Neurological:      Mental Status: She is alert.   Psychiatric:         Mood and Affect: Mood normal.         Behavior: Behavior normal.         Thought Content: Thought content normal.         Judgment: Judgment normal.         Assessment/Plan   Diagnoses and all orders for this visit:  Anxiety with depression  -     Comprehensive Metabolic Panel; Future  -     Vitamin B12; Future  -     Vitamin D 25-Hydroxy,Total (for eval of Vitamin D levels); Future  -     CBC and Auto Differential; Future  -     TSH with reflex to " Free T4 if abnormal; Future  Chronic fatigue  -     Comprehensive Metabolic Panel; Future  -     Vitamin B12; Future  -     Vitamin D 25-Hydroxy,Total (for eval of Vitamin D levels); Future  -     CBC and Auto Differential; Future  -     TSH with reflex to Free T4 if abnormal; Future  -     escitalopram (Lexapro) 10 mg tablet; Take 1 tablet (10 mg) by mouth once daily.  -     Ferritin; Future  Vitamin D deficiency  -     Vitamin D 25-Hydroxy,Total (for eval of Vitamin D levels); Future    Recommend labs and restarting escitalopram.  Encouraged counseling and ongoing healthy lifestyle interventions.

## 2025-02-27 DIAGNOSIS — N97.9 FEMALE INFERTILITY: ICD-10-CM

## 2025-02-27 LAB
25(OH)D3+25(OH)D2 SERPL-MCNC: 39 NG/ML (ref 30–100)
ALBUMIN SERPL-MCNC: 4.7 G/DL (ref 3.6–5.1)
ALP SERPL-CCNC: 51 U/L (ref 31–125)
ALT SERPL-CCNC: 13 U/L (ref 6–29)
ANION GAP SERPL CALCULATED.4IONS-SCNC: 9 MMOL/L (CALC) (ref 7–17)
AST SERPL-CCNC: 18 U/L (ref 10–30)
BASOPHILS # BLD AUTO: 58 CELLS/UL (ref 0–200)
BASOPHILS NFR BLD AUTO: 0.8 %
BILIRUB SERPL-MCNC: 0.5 MG/DL (ref 0.2–1.2)
BUN SERPL-MCNC: 10 MG/DL (ref 7–25)
CALCIUM SERPL-MCNC: 9.6 MG/DL (ref 8.6–10.2)
CHLORIDE SERPL-SCNC: 104 MMOL/L (ref 98–110)
CO2 SERPL-SCNC: 26 MMOL/L (ref 20–32)
CREAT SERPL-MCNC: 1.02 MG/DL (ref 0.5–0.97)
EGFRCR SERPLBLD CKD-EPI 2021: 73 ML/MIN/1.73M2
EOSINOPHIL # BLD AUTO: 161 CELLS/UL (ref 15–500)
EOSINOPHIL NFR BLD AUTO: 2.2 %
ERYTHROCYTE [DISTWIDTH] IN BLOOD BY AUTOMATED COUNT: 11.9 % (ref 11–15)
FERRITIN SERPL-MCNC: 72 NG/ML (ref 16–154)
GLUCOSE SERPL-MCNC: 81 MG/DL (ref 65–139)
HCT VFR BLD AUTO: 40.6 % (ref 35–45)
HGB BLD-MCNC: 13.4 G/DL (ref 11.7–15.5)
LYMPHOCYTES # BLD AUTO: 1664 CELLS/UL (ref 850–3900)
LYMPHOCYTES NFR BLD AUTO: 22.8 %
MCH RBC QN AUTO: 30.9 PG (ref 27–33)
MCHC RBC AUTO-ENTMCNC: 33 G/DL (ref 32–36)
MCV RBC AUTO: 93.8 FL (ref 80–100)
MONOCYTES # BLD AUTO: 548 CELLS/UL (ref 200–950)
MONOCYTES NFR BLD AUTO: 7.5 %
NEUTROPHILS # BLD AUTO: 4869 CELLS/UL (ref 1500–7800)
NEUTROPHILS NFR BLD AUTO: 66.7 %
PLATELET # BLD AUTO: 306 THOUSAND/UL (ref 140–400)
PMV BLD REES-ECKER: 11.5 FL (ref 7.5–12.5)
POTASSIUM SERPL-SCNC: 4.5 MMOL/L (ref 3.5–5.3)
PROT SERPL-MCNC: 7 G/DL (ref 6.1–8.1)
RBC # BLD AUTO: 4.33 MILLION/UL (ref 3.8–5.1)
SODIUM SERPL-SCNC: 139 MMOL/L (ref 135–146)
TSH SERPL-ACNC: 1.61 MIU/L
VIT B12 SERPL-MCNC: 786 PG/ML (ref 200–1100)
WBC # BLD AUTO: 7.3 THOUSAND/UL (ref 3.8–10.8)

## 2025-02-28 ENCOUNTER — ANCILLARY PROCEDURE (OUTPATIENT)
Dept: ENDOCRINOLOGY | Facility: CLINIC | Age: 40
End: 2025-02-28
Payer: COMMERCIAL

## 2025-02-28 ENCOUNTER — SPECIALTY PHARMACY (OUTPATIENT)
Dept: PHARMACY | Facility: CLINIC | Age: 40
End: 2025-02-28

## 2025-02-28 ENCOUNTER — PHARMACY VISIT (OUTPATIENT)
Dept: PHARMACY | Facility: CLINIC | Age: 40
End: 2025-02-28
Payer: COMMERCIAL

## 2025-02-28 ENCOUNTER — LAB REQUISITION (OUTPATIENT)
Dept: LAB | Facility: HOSPITAL | Age: 40
End: 2025-02-28

## 2025-02-28 DIAGNOSIS — N97.9 FEMALE INFERTILITY, UNSPECIFIED: ICD-10-CM

## 2025-02-28 DIAGNOSIS — N97.9 FEMALE INFERTILITY: ICD-10-CM

## 2025-02-28 LAB
ESTRADIOL SERPL-MCNC: 829 PG/ML
LH SERPL-ACNC: 7 IU/L
PROGEST SERPL-MCNC: 0.5 NG/ML

## 2025-02-28 PROCEDURE — 82670 ASSAY OF TOTAL ESTRADIOL: CPT

## 2025-02-28 PROCEDURE — 76857 US EXAM PELVIC LIMITED: CPT

## 2025-02-28 PROCEDURE — 83002 ASSAY OF GONADOTROPIN (LH): CPT

## 2025-02-28 PROCEDURE — RXMED WILLOW AMBULATORY MEDICATION CHARGE

## 2025-02-28 PROCEDURE — 84144 ASSAY OF PROGESTERONE: CPT

## 2025-02-28 NOTE — PROGRESS NOTES
CYCLING NOTE - STIMULATED LINING CHECK  Cycle #: 2 (1st stimulated)  Reason For Treatment: Male Infertility   Protocol: Stimulated FET - will only transfer with a lining >6   Day of stim: 8  Patient Hx: 39 year old, patient of Dr. Poole   Notes: TRANSFER UNDER ANESTHESIA!   WILL NOT CANCEL WITHOUT TALKING WITH DR POOLE     Here for US and/or lab monitoring; relevant findings reviewed.    Patient did not stay for discussion after monitoring,  Team will contact patient later today with results and plan.    Nava Pereira  02/28/2025  7:27 AM      LVM and sent [employer] message with plan.   Nava Pereira 02/28/25 1:54 PM

## 2025-03-03 ENCOUNTER — APPOINTMENT (OUTPATIENT)
Dept: LAB | Facility: HOSPITAL | Age: 40
End: 2025-03-03
Payer: COMMERCIAL

## 2025-03-03 ENCOUNTER — LAB REQUISITION (OUTPATIENT)
Dept: LAB | Facility: HOSPITAL | Age: 40
End: 2025-03-03

## 2025-03-03 ENCOUNTER — PHARMACY VISIT (OUTPATIENT)
Dept: PHARMACY | Facility: CLINIC | Age: 40
End: 2025-03-03
Payer: COMMERCIAL

## 2025-03-03 ENCOUNTER — ANCILLARY PROCEDURE (OUTPATIENT)
Dept: ENDOCRINOLOGY | Facility: CLINIC | Age: 40
End: 2025-03-03
Payer: COMMERCIAL

## 2025-03-03 DIAGNOSIS — N97.9 FEMALE INFERTILITY, UNSPECIFIED: ICD-10-CM

## 2025-03-03 DIAGNOSIS — N97.9 FEMALE INFERTILITY: ICD-10-CM

## 2025-03-03 LAB
ESTRADIOL SERPL-MCNC: 898 PG/ML
LH SERPL-ACNC: 0.6 IU/L
PROGEST SERPL-MCNC: 0.4 NG/ML

## 2025-03-03 PROCEDURE — 82670 ASSAY OF TOTAL ESTRADIOL: CPT

## 2025-03-03 PROCEDURE — 84144 ASSAY OF PROGESTERONE: CPT

## 2025-03-03 PROCEDURE — 76857 US EXAM PELVIC LIMITED: CPT | Performed by: OBSTETRICS & GYNECOLOGY

## 2025-03-03 PROCEDURE — RXMED WILLOW AMBULATORY MEDICATION CHARGE

## 2025-03-03 PROCEDURE — 83002 ASSAY OF GONADOTROPIN (LH): CPT

## 2025-03-03 PROCEDURE — 76857 US EXAM PELVIC LIMITED: CPT

## 2025-03-03 NOTE — PROGRESS NOTES
CYCLING NOTE - STIMULATED LINING CHECK   Cycle #: 2 (1st stimulated)  Reason For Treatment: Male Infertility   Protocol: Stimulated FET - will only transfer with a lining >6. Antagonist started 3/1  Day of stim: 11  Patient Hx: 39 year old, patient of Dr. Poole   Notes: TRANSFER UNDER ANESTHESIA!   WILL NOT CANCEL WITHOUT TALKING WITH DR POOLE    Here for US and/or lab monitoring; relevant findings reviewed.    Patient did not stay for discussion after monitoring,  Team will contact patient later today with results and plan.    Nava Pereira  03/03/2025  9:22 AM      LVM and sent detailed Tech in [employer] message with plan.   Patient to trigger tonight with hcg for FET on 3/10. Patient will take UPT tomorrow morning and make sure it is positive. Patient will start IM P4 75mg on 3/6 - she was told to call office to meet with a nurse to have injection sites marked.   Message sent to team on the weekend to review times with patient once triggers are decided on 3/8.   Nava Pereira 03/03/25 1:52 PM

## 2025-03-04 ENCOUNTER — APPOINTMENT (OUTPATIENT)
Dept: ENDOCRINOLOGY | Facility: CLINIC | Age: 40
End: 2025-03-04
Payer: COMMERCIAL

## 2025-03-09 ENCOUNTER — PREP FOR PROCEDURE (OUTPATIENT)
Dept: ENDOCRINOLOGY | Facility: CLINIC | Age: 40
End: 2025-03-09
Payer: COMMERCIAL

## 2025-03-09 RX ORDER — ONDANSETRON HYDROCHLORIDE 2 MG/ML
4 INJECTION, SOLUTION INTRAVENOUS AS NEEDED
OUTPATIENT
Start: 2025-03-09

## 2025-03-09 RX ORDER — ACETAMINOPHEN 325 MG/1
650 TABLET ORAL ONCE AS NEEDED
OUTPATIENT
Start: 2025-03-09

## 2025-03-10 ENCOUNTER — LAB REQUISITION (OUTPATIENT)
Dept: LAB | Facility: HOSPITAL | Age: 40
End: 2025-03-10

## 2025-03-10 ENCOUNTER — ANCILLARY PROCEDURE (OUTPATIENT)
Dept: ENDOCRINOLOGY | Facility: CLINIC | Age: 40
End: 2025-03-10
Payer: COMMERCIAL

## 2025-03-10 ENCOUNTER — HOSPITAL ENCOUNTER (OUTPATIENT)
Dept: ENDOCRINOLOGY | Facility: CLINIC | Age: 40
Discharge: HOME | End: 2025-03-10
Payer: COMMERCIAL

## 2025-03-10 ENCOUNTER — ANESTHESIA (OUTPATIENT)
Dept: ENDOCRINOLOGY | Facility: CLINIC | Age: 40
End: 2025-03-10
Payer: COMMERCIAL

## 2025-03-10 ENCOUNTER — ANESTHESIA EVENT (OUTPATIENT)
Dept: ENDOCRINOLOGY | Facility: CLINIC | Age: 40
End: 2025-03-10
Payer: COMMERCIAL

## 2025-03-10 VITALS
TEMPERATURE: 98.6 F | SYSTOLIC BLOOD PRESSURE: 113 MMHG | HEIGHT: 65 IN | DIASTOLIC BLOOD PRESSURE: 63 MMHG | HEART RATE: 60 BPM | BODY MASS INDEX: 23.54 KG/M2 | RESPIRATION RATE: 17 BRPM | WEIGHT: 141.31 LBS | OXYGEN SATURATION: 99 %

## 2025-03-10 DIAGNOSIS — Z31.41 FERTILITY TESTING: Primary | ICD-10-CM

## 2025-03-10 DIAGNOSIS — Z31.83 ENCOUNTER FOR ASSISTED REPRODUCTIVE FERTILITY CYCLE: ICD-10-CM

## 2025-03-10 DIAGNOSIS — Z31.41 ENCOUNTER FOR FERTILITY TESTING: ICD-10-CM

## 2025-03-10 DIAGNOSIS — Z31.41 FERTILITY TESTING: ICD-10-CM

## 2025-03-10 LAB — PROGEST SERPL-MCNC: 97.4 NG/ML

## 2025-03-10 PROCEDURE — 84144 ASSAY OF PROGESTERONE: CPT

## 2025-03-10 PROCEDURE — A58974

## 2025-03-10 PROCEDURE — 7100000010 HC PHASE TWO TIME - EACH INCREMENTAL 1 MINUTE

## 2025-03-10 PROCEDURE — 76998 US GUIDE INTRAOP: CPT | Performed by: OBSTETRICS & GYNECOLOGY

## 2025-03-10 PROCEDURE — 3700000002 HC GENERAL ANESTHESIA TIME - EACH INCREMENTAL 1 MINUTE

## 2025-03-10 PROCEDURE — 89255 PREPARE EMBRYO FOR TRANSFER: CPT | Performed by: OBSTETRICS & GYNECOLOGY

## 2025-03-10 PROCEDURE — 89352 THAWING CRYOPRESRVED EMBRYO: CPT | Performed by: OBSTETRICS & GYNECOLOGY

## 2025-03-10 PROCEDURE — 2500000004 HC RX 250 GENERAL PHARMACY W/ HCPCS (ALT 636 FOR OP/ED)

## 2025-03-10 PROCEDURE — 51701 INSERT BLADDER CATHETER: CPT | Mod: 59

## 2025-03-10 PROCEDURE — 3700000001 HC GENERAL ANESTHESIA TIME - INITIAL BASE CHARGE

## 2025-03-10 PROCEDURE — 58974 EMBRYO TRANSFER INTRAUTERINE: CPT | Performed by: OBSTETRICS & GYNECOLOGY

## 2025-03-10 PROCEDURE — 7100000009 HC PHASE TWO TIME - INITIAL BASE CHARGE

## 2025-03-10 PROCEDURE — 89253 EMBRYO HATCHING: CPT | Performed by: OBSTETRICS & GYNECOLOGY

## 2025-03-10 RX ORDER — FENTANYL CITRATE 50 UG/ML
INJECTION, SOLUTION INTRAMUSCULAR; INTRAVENOUS AS NEEDED
Status: DISCONTINUED | OUTPATIENT
Start: 2025-03-10 | End: 2025-03-10

## 2025-03-10 RX ORDER — KETOROLAC TROMETHAMINE 30 MG/ML
INJECTION, SOLUTION INTRAMUSCULAR; INTRAVENOUS AS NEEDED
Status: DISCONTINUED | OUTPATIENT
Start: 2025-03-10 | End: 2025-03-10

## 2025-03-10 RX ORDER — PROPOFOL 10 MG/ML
INJECTION, EMULSION INTRAVENOUS CONTINUOUS PRN
Status: DISCONTINUED | OUTPATIENT
Start: 2025-03-10 | End: 2025-03-10

## 2025-03-10 RX ORDER — LIDOCAINE HYDROCHLORIDE 20 MG/ML
INJECTION, SOLUTION INFILTRATION; PERINEURAL AS NEEDED
Status: DISCONTINUED | OUTPATIENT
Start: 2025-03-10 | End: 2025-03-10

## 2025-03-10 RX ORDER — ONDANSETRON HYDROCHLORIDE 2 MG/ML
INJECTION, SOLUTION INTRAVENOUS AS NEEDED
Status: DISCONTINUED | OUTPATIENT
Start: 2025-03-10 | End: 2025-03-10

## 2025-03-10 RX ORDER — ACETAMINOPHEN 325 MG/1
650 TABLET ORAL ONCE AS NEEDED
Status: DISCONTINUED | OUTPATIENT
Start: 2025-03-10 | End: 2025-03-11 | Stop reason: HOSPADM

## 2025-03-10 RX ORDER — MIDAZOLAM HYDROCHLORIDE 5 MG/ML
INJECTION INTRAMUSCULAR; INTRAVENOUS AS NEEDED
Status: DISCONTINUED | OUTPATIENT
Start: 2025-03-10 | End: 2025-03-10

## 2025-03-10 RX ORDER — ONDANSETRON HYDROCHLORIDE 2 MG/ML
4 INJECTION, SOLUTION INTRAVENOUS AS NEEDED
Status: DISCONTINUED | OUTPATIENT
Start: 2025-03-10 | End: 2025-03-11 | Stop reason: HOSPADM

## 2025-03-10 RX ADMIN — MIDAZOLAM HYDROCHLORIDE 2 MG: 5 INJECTION INTRAMUSCULAR; INTRAVENOUS at 09:19

## 2025-03-10 RX ADMIN — PROPOFOL 30 MG: 10 INJECTION, EMULSION INTRAVENOUS at 09:23

## 2025-03-10 RX ADMIN — PROPOFOL 50 MG: 10 INJECTION, EMULSION INTRAVENOUS at 09:19

## 2025-03-10 RX ADMIN — DEXAMETHASONE SODIUM PHOSPHATE 4 MG: 4 INJECTION, SOLUTION INTRA-ARTICULAR; INTRALESIONAL; INTRAMUSCULAR; INTRAVENOUS; SOFT TISSUE at 09:24

## 2025-03-10 RX ADMIN — KETOROLAC TROMETHAMINE 30 MG: 30 INJECTION, SOLUTION INTRAMUSCULAR; INTRAVENOUS at 09:29

## 2025-03-10 RX ADMIN — FENTANYL CITRATE 50 MCG: 50 INJECTION, SOLUTION INTRAMUSCULAR; INTRAVENOUS at 09:19

## 2025-03-10 RX ADMIN — SODIUM CHLORIDE, POTASSIUM CHLORIDE, SODIUM LACTATE AND CALCIUM CHLORIDE: 600; 310; 30; 20 INJECTION, SOLUTION INTRAVENOUS at 09:13

## 2025-03-10 RX ADMIN — FENTANYL CITRATE 25 MCG: 50 INJECTION, SOLUTION INTRAMUSCULAR; INTRAVENOUS at 09:25

## 2025-03-10 RX ADMIN — ONDANSETRON 4 MG: 2 INJECTION INTRAMUSCULAR; INTRAVENOUS at 09:29

## 2025-03-10 RX ADMIN — PROPOFOL 100 MCG/KG/MIN: 10 INJECTION, EMULSION INTRAVENOUS at 09:20

## 2025-03-10 RX ADMIN — LIDOCAINE HYDROCHLORIDE 60 MG: 20 INJECTION, SOLUTION INFILTRATION; PERINEURAL at 09:19

## 2025-03-10 SDOH — HEALTH STABILITY: MENTAL HEALTH: CURRENT SMOKER: 0

## 2025-03-10 ASSESSMENT — PAIN - FUNCTIONAL ASSESSMENT
PAIN_FUNCTIONAL_ASSESSMENT: UNABLE TO SELF-REPORT
PAIN_FUNCTIONAL_ASSESSMENT: 0-10

## 2025-03-10 ASSESSMENT — PAIN SCALES - GENERAL
PAINLEVEL_OUTOF10: 0 - NO PAIN

## 2025-03-10 NOTE — NURSING NOTE
Patient discharged to home in stable condition via wheelchair to [facility] HOME: Partner's car. Discharge instructions given and concerns addressed.  Alma Delia Alexander RN 03/10/25 10:28 AM

## 2025-03-10 NOTE — DISCHARGE INSTRUCTIONS
[facility]  [address]. [place] [id number]  Tel: [phone]   Fax: [phone]    Home Going Instructions after Embryo transfer w/ anesthesia:    Activity:   We do not recommend any exercise on the day of your transfer.  You may return to work the following day.  Please abstain from intercourse until you have test for pregnancy.     Anesthesia:  Drink small amounts of liquids initially and then slowly increase your intake of food. Drinking fluids will keep your bowels regular.   Avoid foods that are sweet, spicy or hard to digest today.  If you feel nauseated, rest your stomach for one hour, and then try drinking clear liquids again.  You may take a stool softener or miralax/milk of magnesia to help with constipation that may occur after anesthesia.  Please make sure a responsible adult is with you for at least 24 hours after surgery and do not drive or make important decisions during this time. Anesthesia may affect your judgment, coordination, and reaction time.    Pain:  If you experience any post-op pain, pain can be managed by taking Tylenol.    When to call your provider:  Vaginal bleeding that is more than a normal period that doesn't taper down.  Bleeding through 1 sanitary pad an hour.  Any clots the size of an egg or bigger.  Fever of 100.4 or higher with chills.  Unusual or foul smelling discharge.  Worsening abdominal pain.    [facility]  [address]. [place] [id number]  Tel: [phone]   Fax: [phone]    Home Going Instructions after Embryo Transfer:     After completing your embryo transfer please treat your body as though you are pregnant.  No smoking, alcohol, or recreational drugs.  Make sure you are taking a prenatal vitamin daily.   Continue all medications currently prescribed for embryo transfer until otherwise instructed by your physician,  even if you experience spotting or bleeding and even if you have a negative home pregnancy test.   Medications to avoid in pregnancy: Advil, Motrin, Ibuprofen, Aleve, Excedrin, Ave-Wray, Sudafed, and Pepto-Bismol. Avoid aspirin unless you are taking this daily at the direction of your physician.   Medications that are generally safe in pregnancy: Tylenol, Benadryl, Plain Robitussin, Zyrtec, Claritin, Pepcid, Tums, Rolaids, Mylanta, Nasonex.   Foods to avoid:   Seafood that is high in mercury; you can have canned tuna twice a week.   Deli meats, deli salads, hot dogs, and unpasteurized cheeses due to the risk of Listeria.  Activities to avoid:   No hot tubs, whirlpools, or saunas.  Avoid starting new exercise routines that you have not done previously.  Avoid lifting > 30 lbs.  No cleaning litter boxes.  No intercourse until you test for pregnancy.   You do not need to be on bed rest following the transfer. It is healthy, normal, and recommended to go about routine physical activity.   We advise against taking a home pregnancy test due to the possibility of false negative and false positive results.   You will test for pregnancy in approximately 10 days, at which point you will be about 4 weeks pregnant.   If blood work was drawn on the day of your transfer, you can expect a phone that day with the results of your bloodwork. We expect a progesterone level greater than or equal to 16. If you level is less than 16 we will adjust your progesterone dose and repeat your level in 2 days.

## 2025-03-10 NOTE — ANESTHESIA PREPROCEDURE EVALUATION
Patient: Taty Sosa    Procedure Information       Anesthesia Start Date/Time: 03/10/25 0916    Scheduled providers: Sy Mendoza MD; Tristian Hansen MD; PERLA Squires    Procedure: EMBRYO TRANSFER    [facility]: [facility]; [facility]            Relevant Problems   Anesthesia (within normal limits)      Cardiac   (+) Angina pectoris   (+) Pleuritic chest pain   (+) Ventricular premature contractions      Pulmonary (within normal limits)      Neuro   (+) Anxiety   (+) Anxiety with depression   (+) Bipolar disorder   (+) Depression      GI   (+) Dysphagia   (+) Gastroesophageal reflux disease with hiatal hernia      /Renal (within normal limits)      Liver (within normal limits)      Endocrine (within normal limits)      Hematology (within normal limits)      Musculoskeletal (within normal limits)      HEENT   (+) Seasonal allergies      ID (within normal limits)      Skin (within normal limits)      GYN (within normal limits)       Clinical information reviewed:   Tobacco  Allergies  Meds   Med Hx  Surg Hx   Fam Hx  Soc Hx        NPO Detail:  NPO/Void Status  Date of Last Liquid: 03/10/25  Time of Last Liquid: 0600  Date of Last Solid: 03/09/25  Time of Last Solid: 2030         Physical Exam    Airway  Mallampati: I  TM distance: >3 FB  Neck ROM: full     Cardiovascular - normal exam     Dental - normal exam     Pulmonary - normal exam     Abdominal - normal exam         Anesthesia Plan    History of general anesthesia?: yes  History of complications of general anesthesia?: no    ASA 1     general     The patient is not a current smoker.    Anesthetic plan and risks discussed with patient.

## 2025-03-10 NOTE — PROGRESS NOTES
Patient ID: Taty Sosa is a 39 y.o. female.    Embryo Transfer    Date/Time: 3/10/2025 9:18 AM    Performed by: Sy Mendoza MD  Authorized by: Tiffany Morales MD    Consent:     Consent obtained:  Written    Consent given by:  Patient    Procedure risks and benefits discussed: yes      Patient questions answered: yes      Patient agrees, verbalizes understanding, and wants to proceed: yes      Educational handouts given: yes      Instructions and paperwork completed: yes    Procedure:     Anesthesia:  MAC    Pelvic exam performed: No      Cervix cleaned and prepped: Yes      Speculum placed in vagina: Yes      Ultrasound guidance: Yes      Speculum type: Rambo      Catheter type:  Sure View Prieto    Uterine position:  Anteverted    Bladder backfilling performed: Yes      Uterine visualization complete: Yes      Difficulty: easy      Estimated blood loss:  None  Post-procedure:     Patient tolerated procedure well: yes    Comments:      Preop diagnosis: Infertility  Post op diagnosis: Same  Assistant: Dr. Castillo  Depth: 7 cm  Curve: anterior  Distance from fundus: 11 mm  Embryo stage at day of transfer: day 6 x 2  Embryo notes: 3BB and 4BB   PGT: Not performed  Anesthesia: MAC    IV Fluids: None  UOP: Not recorded  Specimens: None  Complications: None    Patient counseled and understands risks of multiple gestation, elected to proceed with transfer of 2 embryos.    Attending Attestation: I was physically present for key and critical portions performed by the fellow. I reviewed the fellow's documentation and discussed the patient with the fellow. I agree with the fellow's medical decision making as documented in the fellow's note.   Sy Mendoza 03/10/25 9:18 AM

## 2025-03-10 NOTE — ANESTHESIA POSTPROCEDURE EVALUATION
Patient: Taty Sosa    Procedure Summary       Date: 03/10/25 [facility] / [facility]:  [facility]    Anesthesia Start: 0916 Anesthesia Stop: 0938    Procedure: EMBRYO TRANSFER Diagnosis: Encounter for assisted reproductive fertility cycle    Scheduled Providers: Sy Mendoza MD; Tristian Hansen MD; PERLA Squires Responsible Provider: Tristian Hansen MD    Anesthesia Type: MAC ASA Status: 1            Anesthesia Type: MAC    Vitals Value Taken Time   /63 03/10/25 1020   Temp 37 °C (98.6 °F) 03/10/25 0935   Pulse 60 03/10/25 1020   Resp 17 03/10/25 1020   SpO2 99 % 03/10/25 1020       Anesthesia Post Evaluation    Patient location during evaluation: bedside  Patient participation: complete - patient participated  Level of consciousness: awake and alert  Pain management: adequate  Airway patency: patent  Cardiovascular status: acceptable  Respiratory status: acceptable  Hydration status: acceptable  Postoperative Nausea and Vomiting: none    There were no known notable events for this encounter.

## 2025-03-11 ENCOUNTER — APPOINTMENT (OUTPATIENT)
Dept: PRIMARY CARE | Facility: CLINIC | Age: 40
End: 2025-03-11
Payer: COMMERCIAL

## 2025-03-14 DIAGNOSIS — R30.0 DYSURIA: Primary | ICD-10-CM

## 2025-03-15 LAB
APPEARANCE UR: CLEAR
BACTERIA #/AREA URNS HPF: ABNORMAL /HPF
BACTERIA UR CULT: ABNORMAL
BILIRUB UR QL STRIP: NEGATIVE
COLOR UR: YELLOW
GLUCOSE UR QL STRIP: NEGATIVE
HGB UR QL STRIP: NEGATIVE
HYALINE CASTS #/AREA URNS LPF: ABNORMAL /LPF
KETONES UR QL STRIP: ABNORMAL
LEUKOCYTE ESTERASE UR QL STRIP: NEGATIVE
NITRITE UR QL STRIP: NEGATIVE
PH UR STRIP: 5.5 [PH] (ref 5–8)
PROT UR QL STRIP: NEGATIVE
RBC #/AREA URNS HPF: ABNORMAL /HPF
SERVICE CMNT-IMP: ABNORMAL
SP GR UR STRIP: 1.02 (ref 1–1.03)
SQUAMOUS #/AREA URNS HPF: ABNORMAL /HPF
WBC #/AREA URNS HPF: ABNORMAL /HPF

## 2025-03-20 ENCOUNTER — LAB REQUISITION (OUTPATIENT)
Dept: LAB | Facility: HOSPITAL | Age: 40
End: 2025-03-20
Payer: COMMERCIAL

## 2025-03-20 ENCOUNTER — PATIENT MESSAGE (OUTPATIENT)
Dept: ENDOCRINOLOGY | Facility: CLINIC | Age: 40
End: 2025-03-20

## 2025-03-20 DIAGNOSIS — Z31.41 ENCOUNTER FOR FERTILITY TESTING: ICD-10-CM

## 2025-03-20 DIAGNOSIS — N97.9 FEMALE INFERTILITY: ICD-10-CM

## 2025-03-20 DIAGNOSIS — Z32.01 POSITIVE BLOOD PREGNANCY TEST (HHS-HCC): Primary | ICD-10-CM

## 2025-03-20 LAB — B-HCG SERPL-ACNC: 126 MIU/ML

## 2025-03-20 PROCEDURE — 84702 CHORIONIC GONADOTROPIN TEST: CPT

## 2025-03-20 NOTE — PROGRESS NOTES
Initial HC  Patient's GÉNESIS Provider: Tiffany Morales MD  Infertility dx: Male infertility  Achieved pregnancy by: Embryo transfer on 3/10  Fertility medications used this cycle: injectables  LMP: Patient's last menstrual period was 2025  EGA based on (IUI date, ET ): embryo transfer date - 4w1d (transferred 2 blasts)    Updated G/P with this pregnancy:       Type & Screen: 2024  ABO: A  Rh: POS  Antibody: NEG  History of miscarriage: No  History of pelvic/abdominal surgeries: Yes, laparoscopic nissen fundoplication 2017   History of STDs/PID: No  Hx of ectopic: No  Hx of tubal disease/ blockage: No    Risk for ectopic: No      Current medications:   PNV: Yes  Vitamin D 2000 IUs: No - recommend to start  Luteal support: IM DAVID 75 mg daily  Additional medications:   Lexapro 5mg daily  Baby ASA 81mg   Unisom 12.5mg nightly       Nava Pereira  2025  9:16 AM    [facility] PROVIDER NOTE - HCG REVIEW  Ultrasound and/or labs reviewed at [facility].     Results for orders placed or performed in visit on 25 (from the past 96 hours)   Human Chorionic Gonadotropin, Serum Quantitative   Result Value Ref Range    HCG, Beta-Quantitative 126 (H) <5 mIU/mL     *Note: Due to a large number of results and/or encounters for the requested time period, some results have not been displayed. A complete set of results can be found in Results Review.     RTC in ONE WEEK for HCG .  Harika Rubio  2025  12:44 PM        Phone call to patient to discuss results of blood pregnancy test. Shared with patient that HCG level is 126 and they are 4 weeks and 1 days pregnant. Reviewed with patient that we are cautiously optimistic that this is a good level. Reviewed with patient to continue all current medications and that we will plan to repeat level in 1 week. Reviewed with patient to reach out to their primary OBGYN to schedule new OB visit at this time as patient needs to be seen by 9 weeks gestation. She  verbalizes understanding and is agreeable.    Nava Pereira 03/20/25 1:54 PM

## 2025-03-27 ENCOUNTER — LAB REQUISITION (OUTPATIENT)
Dept: LAB | Facility: HOSPITAL | Age: 40
End: 2025-03-27
Payer: COMMERCIAL

## 2025-03-27 DIAGNOSIS — Z32.01 ENCOUNTER FOR PREGNANCY TEST, RESULT POSITIVE (HHS-HCC): ICD-10-CM

## 2025-03-27 DIAGNOSIS — O36.80X0 ENCOUNTER TO DETERMINE FETAL VIABILITY OF PREGNANCY, NOT APPLICABLE OR UNSPECIFIED FETUS: ICD-10-CM

## 2025-03-27 LAB — B-HCG SERPL-ACNC: 4054 MIU/ML

## 2025-03-27 PROCEDURE — 84702 CHORIONIC GONADOTROPIN TEST: CPT

## 2025-03-27 NOTE — PROGRESS NOTES
Initial HC  Patient's GÉNESIS Provider: Tiffany Morales MD  Infertility dx: Male infertility  Achieved pregnancy by: Embryo transfer on 3/10  Fertility medications used this cycle: injectables  LMP: Patient's last menstrual period was 2025  EGA based on (IUI date, ET ): embryo transfer date - 5w1d (transferred 2 blasts)      Latest Reference Range & Units 25 07:31 25 07:32   HCG, Beta-Quantitative <5 mIU/mL 126 (H) 4,054 (H)   (H): Data is abnormally high      Updated G/P with this pregnancy:         Type & Screen: 2024  ABO: A  Rh: POS  Antibody: NEG  History of miscarriage: No  History of pelvic/abdominal surgeries: Yes, laparoscopic nissen fundoplication 2017   History of STDs/PID: No  Hx of ectopic: No  Hx of tubal disease/ blockage: No     Risk for ectopic: No        Current medications:   PNV: Yes  Vitamin D 2000 IUs: No - recommend to start  Luteal support: IM DAVID 75 mg daily  Additional medications:   Lexapro 5mg daily  Baby ASA 81mg   Unisom 12.5mg nightly     25 at 7:44 AM - Althea Dominique LPN    Monitoring patient: FET on 3-, repeat HCG Level today with appropriate rise to 4,054 from 126 on 3-. Patient for an OB Scan & will continue current FET medications. Plan to be communicated by RN. Plan agreed upon by Dr. Mendoza. Loida Branham CNP 25 12:48 PM       Called patient to review results and plan. Patient aware to schedule ob scan for 6w5d. Patient stated her injection sites have been doing better since she has been able to massage them more thoroughly. Patient aware to continue on medications ,transferred to  for scheduling.

## 2025-03-28 ENCOUNTER — PHARMACY VISIT (OUTPATIENT)
Dept: PHARMACY | Facility: CLINIC | Age: 40
End: 2025-03-28
Payer: COMMERCIAL

## 2025-03-28 ENCOUNTER — SPECIALTY PHARMACY (OUTPATIENT)
Dept: PHARMACY | Facility: CLINIC | Age: 40
End: 2025-03-28

## 2025-03-28 PROCEDURE — RXMED WILLOW AMBULATORY MEDICATION CHARGE

## 2025-04-07 ENCOUNTER — OFFICE VISIT (OUTPATIENT)
Dept: ENDOCRINOLOGY | Facility: CLINIC | Age: 40
End: 2025-04-07
Payer: COMMERCIAL

## 2025-04-07 ENCOUNTER — ANCILLARY PROCEDURE (OUTPATIENT)
Dept: ENDOCRINOLOGY | Facility: CLINIC | Age: 40
End: 2025-04-07
Payer: COMMERCIAL

## 2025-04-07 DIAGNOSIS — O36.80X0 ENCOUNTER TO DETERMINE FETAL VIABILITY OF PREGNANCY, NOT APPLICABLE OR UNSPECIFIED FETUS: ICD-10-CM

## 2025-04-07 DIAGNOSIS — O36.80X0 ENCOUNTER TO DETERMINE FETAL VIABILITY OF PREGNANCY, SINGLE OR UNSPECIFIED FETUS: Primary | ICD-10-CM

## 2025-04-07 PROCEDURE — 99213 OFFICE O/P EST LOW 20 MIN: CPT | Performed by: NURSE PRACTITIONER

## 2025-04-07 PROCEDURE — 76817 TRANSVAGINAL US OBSTETRIC: CPT

## 2025-04-07 PROCEDURE — 99213 OFFICE O/P EST LOW 20 MIN: CPT | Mod: 25 | Performed by: NURSE PRACTITIONER

## 2025-04-07 NOTE — PROGRESS NOTES
Visit Type: In Person  MD reviewed, Authorization status not noted.    OB Scan    Ectopic risk factor: no  PGTA/PGTM: no  Blood type: A  Method of Conception: Frozen Embryo transfer     Reviewed results from OB ultrasound today and results reviewed with pt as follows:     OB Scan results:   Concep??on Concep??on: IVF Embryo Transfer  Embryo  transfer  3/10/2025 IVF / ET: 6 d 6 w + 6 d 11/25/2025  Stated NINA 6 w + 6 d 11/25/2025  U/S 4/7/2025 based upon CRL 6 w + 6 d 11/25/2025  Assessment Gesta??onal sac: visualized. Loca??on: intrauterine  Yolk sac: visualized  Embryo: visualized  Cardiac ac??vity: present  Early placenta: circumferen??al  YS 3.0 mm -/- Papaioannou  CRL 7.3 mm 6w 6d Pexsters   bpm    Detailed discussion with patient about her scan results, pregnancy, and next steps:    Plan:   Reviewed medications in detail. She will continue PNV.   Reviewed supplemental progesterone, route and dose, reviewed dates of cessation. - 5/5/25 take your last dose of DAVID.  Baby aspirin 81mg po daily stops at 12 weeks.   Discussed with patient any pregnancy concerns and discussed establishing care with an  OB.  Will provide recommendations if she desires.   Advised to call with bleeding, pain or concerns.    Ultrasound results and Plan of care reviewed with Dr. Sy Mendoza MD    Fertility Plan Update: Ok to move on to OB care.    Intimate Exam Performed: No, an intimate exam was not performed at this encounter.     Moriah Mensah  04/07/2025  3:30 PM

## 2025-04-09 ENCOUNTER — PROCEDURE VISIT (OUTPATIENT)
Dept: SLEEP MEDICINE | Facility: CLINIC | Age: 40
End: 2025-04-09
Payer: COMMERCIAL

## 2025-04-09 DIAGNOSIS — G47.33 OSA (OBSTRUCTIVE SLEEP APNEA): ICD-10-CM

## 2025-04-09 PROCEDURE — 95811 POLYSOM 6/>YRS CPAP 4/> PARM: CPT | Performed by: INTERNAL MEDICINE

## 2025-04-10 ENCOUNTER — CLINICAL SUPPORT (OUTPATIENT)
Dept: SLEEP MEDICINE | Facility: CLINIC | Age: 40
End: 2025-04-10
Payer: COMMERCIAL

## 2025-04-10 VITALS
HEART RATE: 56 BPM | WEIGHT: 141.09 LBS | BODY MASS INDEX: 23.51 KG/M2 | SYSTOLIC BLOOD PRESSURE: 114 MMHG | HEIGHT: 65 IN | OXYGEN SATURATION: 99 % | DIASTOLIC BLOOD PRESSURE: 75 MMHG | RESPIRATION RATE: 14 BRPM

## 2025-04-10 DIAGNOSIS — G47.21 CIRCADIAN RHYTHM SLEEP DISORDER, DELAYED SLEEP PHASE TYPE: ICD-10-CM

## 2025-04-10 DIAGNOSIS — R40.0 SOMNOLENCE: Primary | ICD-10-CM

## 2025-04-10 DIAGNOSIS — G47.33 OSA (OBSTRUCTIVE SLEEP APNEA): ICD-10-CM

## 2025-04-10 PROCEDURE — 95805 MULTIPLE SLEEP LATENCY TEST: CPT | Performed by: INTERNAL MEDICINE

## 2025-04-10 NOTE — PROGRESS NOTES
[facility] TECH NOTE:     Patient: Taty Sosa   MRN//AGE: [id number]  1985  39 y.o.   Technologist: Guillermo Pizano   Room: 439B   Service Date: 4/10/2025        Sleep Testing Location: [facility]: No data recorded    TECHNOLOGIST SLEEP STUDY PROCEDURE NOTE:   This sleep study is being conducted according to the policies and procedures outlined by the AAS accreditation standards.  The sleep study procedure and processes involved during this appointment was explained to the patient/patient’s family, questions were answered. The patient/family verbalized understanding.      The patient is a 39 y.o. year old female scheduled for aMSLT/MWT with montage of:  MSLT . she arrived for her appointment.      The study that was ultimately completed was aMSLT/MWT with montage of:  MSLT .    The full study Was completed.  Patient questionnaires completed?: yes     Consents signed? yes    Initial Fall Risk Screening:     Taty has not fallen in the last 6 months. her did not result in injury. Taty does not have a fear of falling. He does not need assistance with sitting, standing, or walking. she does not need assistance walking in her home. she does not need assistance in an unfamiliar setting. The patient is notusing an assistive device.     Brief Study observations: MSLT with CPAP 8 cmH2O.      Deviation to order/protocol and reason: N/A      If PAP, which was preferred mask/pressure/mode: N/A      Other:None    After the procedure, the patient/family was informed to ensure followup with ordering clinician for testing results.      Technologist: Guillermo Pizano

## 2025-04-10 NOTE — PROGRESS NOTES
[facility] TECH NOTE:     Patient: Taty Sosa   MRN//AGE: [id number]  1985  39 y.o.   Technologist: Rosi Rich   Room: 439B   Service Date: 4/10/2025        Sleep Testing Location: [facility]    [place]: 16, Neck 35CM    TECHNOLOGIST SLEEP STUDY PROCEDURE NOTE:   This sleep study is being conducted according to the policies and procedures outlined by the AAS accreditation standards.  The sleep study procedure and processes involved during this appointment was explained to the patient/patient’s family, questions were answered. The patient/family verbalized understanding.      The patient is a 39 y.o. year old female scheduled for a PSG on C-pap Therapy  with montage of: PSG on C-pap Therapy. She arrived for her appointment.      The study that was ultimately completed was a PSG on C-pap Therapy with montage of: PSG on C-pap Therapy.    The full study Was completed.  Patient questionnaires completed?: yes     Consents signed? yes    Initial Fall Risk Screening:     Taty has not fallen in the last 6 months. Her did not result in injury. Taty does not have a fear of falling. She does not need assistance with sitting, standing, or walking. She does not need assistance walking in her home. She does not need assistance in an unfamiliar setting. The patient is not using an assistive device.     Brief Study observations: Patient is a 39 year old female, here today for a PSG on C-pap therapy of 8cm/h20.     Deviation to order/protocol and reason: None      If PAP, which was preferred mask/pressure/mode: Respironics Dreamwear Wedge Nasal size Medium      Other: None    After the procedure, the patient/family was informed to ensure followup with ordering clinician for testing results.      Technologist: Rosi Rich

## 2025-04-11 LAB
AMPHETAMINES UR QL: NEGATIVE NG/ML
BARBITURATES UR QL: NEGATIVE NG/ML
BENZODIAZ UR QL: NEGATIVE NG/ML
BZE UR QL: NEGATIVE NG/ML
CREAT UR-MCNC: 74 MG/DL
FENTANYL UR QL SCN: NEGATIVE NG/ML
METHADONE UR QL: NEGATIVE NG/ML
OPIATES UR QL: NEGATIVE NG/ML
OXIDANTS UR QL: NEGATIVE MCG/ML
OXYCODONE UR QL: NEGATIVE NG/ML
PCP UR QL: NEGATIVE NG/ML
PH UR: 7.2 [PH] (ref 4.5–9)
QUEST NOTES AND COMMENTS: NORMAL
THC UR QL: NEGATIVE NG/ML

## 2025-04-17 ENCOUNTER — PHARMACY VISIT (OUTPATIENT)
Dept: PHARMACY | Facility: CLINIC | Age: 40
End: 2025-04-17
Payer: COMMERCIAL

## 2025-04-17 ENCOUNTER — SPECIALTY PHARMACY (OUTPATIENT)
Dept: PHARMACY | Facility: CLINIC | Age: 40
End: 2025-04-17

## 2025-04-17 PROCEDURE — RXMED WILLOW AMBULATORY MEDICATION CHARGE

## 2025-04-23 ENCOUNTER — LAB (OUTPATIENT)
Dept: LAB | Facility: HOSPITAL | Age: 40
End: 2025-04-23
Payer: COMMERCIAL

## 2025-04-23 ENCOUNTER — INITIAL PRENATAL (OUTPATIENT)
Dept: OBSTETRICS AND GYNECOLOGY | Facility: CLINIC | Age: 40
End: 2025-04-23
Payer: COMMERCIAL

## 2025-04-23 VITALS — DIASTOLIC BLOOD PRESSURE: 70 MMHG | WEIGHT: 141.2 LBS | BODY MASS INDEX: 23.53 KG/M2 | SYSTOLIC BLOOD PRESSURE: 116 MMHG

## 2025-04-23 DIAGNOSIS — O09.819: Primary | ICD-10-CM

## 2025-04-23 DIAGNOSIS — Z3A.09 9 WEEKS GESTATION OF PREGNANCY (HHS-HCC): ICD-10-CM

## 2025-04-23 DIAGNOSIS — O09.529 ANTEPARTUM MULTIGRAVIDA OF ADVANCED MATERNAL AGE (HHS-HCC): ICD-10-CM

## 2025-04-23 DIAGNOSIS — Z13.79 ENCOUNTER FOR OTHER SCREENING FOR GENETIC AND CHROMOSOMAL ANOMALIES: ICD-10-CM

## 2025-04-23 LAB
ABO GROUP (TYPE) IN BLOOD: NORMAL
ANTIBODY SCREEN: NORMAL
POC APPEARANCE, URINE: CLEAR
POC BILIRUBIN, URINE: NEGATIVE
POC BLOOD, URINE: NEGATIVE
POC COLOR, URINE: YELLOW
POC GLUCOSE, URINE: NEGATIVE MG/DL
POC KETONES, URINE: NEGATIVE MG/DL
POC LEUKOCYTES, URINE: NEGATIVE
POC NITRITE,URINE: NEGATIVE
POC PH, URINE: 6 PH
POC PROTEIN, URINE: NEGATIVE MG/DL
POC SPECIFIC GRAVITY, URINE: 1.01
POC UROBILINOGEN, URINE: 0.2 EU/DL
PREGNANCY TEST URINE, POC: POSITIVE
RH FACTOR (ANTIGEN D): NORMAL

## 2025-04-23 PROCEDURE — 86850 RBC ANTIBODY SCREEN: CPT

## 2025-04-23 PROCEDURE — 0500F INITIAL PRENATAL CARE VISIT: CPT | Performed by: OBSTETRICS & GYNECOLOGY

## 2025-04-23 PROCEDURE — 86900 BLOOD TYPING SEROLOGIC ABO: CPT

## 2025-04-23 PROCEDURE — 86901 BLOOD TYPING SEROLOGIC RH(D): CPT

## 2025-04-23 PROCEDURE — 36415 COLL VENOUS BLD VENIPUNCTURE: CPT

## 2025-04-23 PROCEDURE — 81025 URINE PREGNANCY TEST: CPT | Performed by: OBSTETRICS & GYNECOLOGY

## 2025-04-23 PROCEDURE — 81003 URINALYSIS AUTO W/O SCOPE: CPT | Mod: QW | Performed by: OBSTETRICS & GYNECOLOGY

## 2025-04-23 RX ORDER — LORATADINE 10 MG/1
10 TABLET ORAL DAILY
COMMUNITY

## 2025-04-23 ASSESSMENT — LIFESTYLE VARIABLES
ENGAGED IN ILLEGAL ACTIVITIES TO OBTAIN DRUGS: NO
DAST10 TOTAL SCORE: 0
HOW OFTEN DO YOU HAVE SIX OR MORE DRINKS ON ONE OCCASION: NEVER
ABLE TO STOP USING DRUGS WHEN WANT: YES
USE MORE THAN ONE DRUG AT A TIME: NO
FEEL BAD ABOUT YOUR DRUG USE: NO
AUDIT-C TOTAL SCORE: 0
HOW OFTEN DO YOU HAVE A DRINK CONTAINING ALCOHOL: NEVER
BLACKOUTS OR FLASHBACKS DUE TO DRUG USE: NO
SKIP TO QUESTIONS 9-10: 1
MEDICAL PROBLEMS AS RESULT OF DRUG USE: NO
MISUSED PRESCRIPTION DRUGS: NO
HOW MANY STANDARD DRINKS CONTAINING ALCOHOL DO YOU HAVE ON A TYPICAL DAY: PATIENT DOES NOT DRINK
EXPERIENCED WITHDRAWAL SYMPTOMS DUE TO HEAVY DRUG INTAKE: NO
NEGLECTED FAMILY OR WORK DUE TO DRUG USE: NO
SPOUSE OR PARENTS COMPLAIN ABOUT INVOLVEMENT WITH DRUGS: NO

## 2025-04-23 ASSESSMENT — ANXIETY QUESTIONNAIRES
IF YOU CHECKED OFF ANY PROBLEMS ON THIS QUESTIONNAIRE, HOW DIFFICULT HAVE THESE PROBLEMS MADE IT FOR YOU TO DO YOUR WORK, TAKE CARE OF THINGS AT HOME, OR GET ALONG WITH OTHER PEOPLE: NOT DIFFICULT AT ALL
2. NOT BEING ABLE TO STOP OR CONTROL WORRYING: NOT AT ALL
5. BEING SO RESTLESS THAT IT IS HARD TO SIT STILL: NOT AT ALL
3. WORRYING TOO MUCH ABOUT DIFFERENT THINGS: NOT AT ALL
6. BECOMING EASILY ANNOYED OR IRRITABLE: NOT AT ALL
GAD7 TOTAL SCORE: 0
7. FEELING AFRAID AS IF SOMETHING AWFUL MIGHT HAPPEN: NOT AT ALL
4. TROUBLE RELAXING: NOT AT ALL
1. FEELING NERVOUS, ANXIOUS, OR ON EDGE: NOT AT ALL

## 2025-04-23 ASSESSMENT — ENCOUNTER SYMPTOMS
DEPRESSION: 0
OCCASIONAL FEELINGS OF UNSTEADINESS: 0
LOSS OF SENSATION IN FEET: 0

## 2025-04-23 ASSESSMENT — PAIN - FUNCTIONAL ASSESSMENT: PAIN_FUNCTIONAL_ASSESSMENT: 0-10

## 2025-04-23 ASSESSMENT — PAIN SCALES - GENERAL: PAINLEVEL_OUTOF10: 0 - NO PAIN

## 2025-04-23 NOTE — PROGRESS NOTES
Subjective   Patient ID [id number]   Taty Sosa is a 39 y.o.  at Unknown with a working estimated date of delivery of Not found. who presents for an initial prenatal visit. This pregnancy is planned.    Her pregnancy is complicated by:  IVF      OB History    Para Term  AB Living   1 0 0 0 0 0   SAB IAB Ectopic Multiple Live Births   0 0 0 0 0      # Outcome Date GA Lbr Max/2nd Weight Sex Type Anes PTL Lv   1 Current                   Objective   Physical Exam  Weight: 64 kg (141 lb 3.2 oz)  Expected Total Weight Gain: Could not be calculated   Pregravid BMI: Could not be calculated  BP: 116/70  Fetal Heart Rate: 160 obtained with ultrasound.  No POCT necessary.    OBGyn Exam    General:  Alert and oriented to person, place, date/time in no apparent distress.  Healthy BMI.  Pleasant.  HEENT:  Normocephalic, atraumatic head.   Breast: deferred to annual exam.   Heart:  Good pulses.   Lungs: Normal effort.   Abdomen:  Soft, nontender.  Genitourinary:  Gravid, small nontender uterus.  Fetal heart rate obtained by transabdominal ultrasound.  Extremities:  Calves nontender.  No edema.  Skin:  Normal turgor and thickness. No acute rash.      Assessment/Plan   1. Pregnancy, high-risk, assisted reproductive technology (HHS-HCC) (Primary)    - POCT UA Automated manually resulted  - POCT pregnancy, urine manually resulted  - Syphilis Screen with Reflex; Future  - Hepatitis C Antibody; Future  - CBC and Auto Differential; Future  - Rubella Antibody, IgG; Future  - Thyroid Stimulating Hormone; Future  - HIV 1/2 Antigen/Antibody Screen with Reflex to Confirmation; Future  - Varicella Zoster Antibody, IgG; Future  - Hepatitis B Surface Antigen; Future  - Urine Culture  - Hemoglobin A1C; Future  - Point of Care Ultrasound  - US MAC OB imaging order; Future  - US OB 14+ weeks anatomy scan; Future  - US OB NT (NUCHAL translucency); Future  - C. trachomatis / N. gonorrhoeae, Amplified, Urogenital  - Type  And Screen Is this order related to pregnancy or an upcoming surgery? Yes; Where will this surgery/delivery be performed? [facility]; What is the date of the surgery? 11/26/2025; Has this patient ever had a transfusion? No; Has t...; Future  - Syphilis Screen with Reflex  - Hepatitis C Antibody  - CBC and Auto Differential  - Rubella Antibody, IgG  - Thyroid Stimulating Hormone  - HIV 1/2 Antigen/Antibody Screen with Reflex to Confirmation  - Varicella Zoster Antibody, IgG  - Hepatitis B Surface Antigen  - Hemoglobin A1C  - Type And Screen Is this order related to pregnancy or an upcoming surgery? Yes; Where will this surgery/delivery be performed? [facility]; What is the date of the surgery? 11/26/2025; Has this patient ever had a transfusion? No; Has t...    2. Antepartum multigravida of advanced maternal age (HHS-HCC)    - Syphilis Screen with Reflex; Future  - Hepatitis C Antibody; Future  - CBC and Auto Differential; Future  - Rubella Antibody, IgG; Future  - Thyroid Stimulating Hormone; Future  - HIV 1/2 Antigen/Antibody Screen with Reflex to Confirmation; Future  - Varicella Zoster Antibody, IgG; Future  - Hepatitis B Surface Antigen; Future  - Urine Culture  - Hemoglobin A1C; Future  - Point of Care Ultrasound  - US MAC OB imaging order; Future  - US OB 14+ weeks anatomy scan; Future  - US OB NT (NUCHAL translucency); Future  - C. trachomatis / N. gonorrhoeae, Amplified, Urogenital  - Type And Screen Is this order related to pregnancy or an upcoming surgery? Yes; Where will this surgery/delivery be performed? [facility]; What is the date of the surgery? 11/26/2025; Has this patient ever had a transfusion? No; Has t...; Future  - Syphilis Screen with Reflex  - Hepatitis C Antibody  - CBC and Auto Differential  - Rubella Antibody, IgG  - Thyroid Stimulating Hormone  - HIV 1/2 Antigen/Antibody Screen with Reflex to Confirmation  - Varicella Zoster Antibody, IgG  -  Hepatitis B Surface Antigen  - Hemoglobin A1C  - Type And Screen Is this order related to pregnancy or an upcoming surgery? Yes; Where will this surgery/delivery be performed? [facility]; What is the date of the surgery? 11/26/2025; Has this patient ever had a transfusion? No; Has t...    3. 9 weeks gestation of pregnancy ([facility])    - POCT UA Automated manually resulted  - POCT pregnancy, urine manually resulted    4. Encounter for other screening for genetic and chromosomal anomalies     Prenatal Labs ordered  Daily prenatal vitamins prescribed  First trimester screening and second trimester screening discussed. Patient decided to continue with prenatal care.  She is a fertility patient of [facility] - IVF.  Will continue progesterone supplementation injection through 11 weeks.  Follow up in 4 weeks for return OB visit.  Safety in pregnancy reviewed including but not exclusive to: vaccination, travel, foods, proper hydration, exercise, and weightbearing.  Reviewed CBR option.  Reviewed positive/negative candidacy for Aspirin prophylaxis  beginning as early as 12 weeks GA secondary to detected risks for preeclampsia.  Consultation and ultrasounds through maternal-fetal medicine.  Reviewed the following: Aspirin prophylaxis reviewed: Chew 162 mg of aspirin daily   [facility] for scans.  Growth at 30 and 36.  NST weekly starting at 36 weeks.  Delivery for 39 weeks if still pregnant.

## 2025-04-25 LAB
BACTERIA UR CULT: NORMAL
C TRACH RRNA SPEC QL NAA+PROBE: NOT DETECTED
N GONORRHOEA RRNA SPEC QL NAA+PROBE: NOT DETECTED
QUEST GC CT AMPLIFIED (ALWAYS MESSAGE): NORMAL

## 2025-04-26 LAB
BASOPHILS # BLD AUTO: 37 CELLS/UL (ref 0–200)
BASOPHILS NFR BLD AUTO: 0.3 %
EOSINOPHIL # BLD AUTO: 756 CELLS/UL (ref 15–500)
EOSINOPHIL NFR BLD AUTO: 6.2 %
ERYTHROCYTE [DISTWIDTH] IN BLOOD BY AUTOMATED COUNT: 11.9 % (ref 11–15)
EST. AVERAGE GLUCOSE BLD GHB EST-MCNC: 108 MG/DL
EST. AVERAGE GLUCOSE BLD GHB EST-SCNC: 6 MMOL/L
HBA1C MFR BLD: 5.4 %
HBV SURFACE AG SERPL QL IA: NORMAL
HCT VFR BLD AUTO: 39.5 % (ref 35–45)
HCV AB SERPL QL IA: NORMAL
HGB BLD-MCNC: 13.3 G/DL (ref 11.7–15.5)
HIV 1+2 AB+HIV1 P24 AG SERPL QL IA: NORMAL
LYMPHOCYTES # BLD AUTO: 1354 CELLS/UL (ref 850–3900)
LYMPHOCYTES NFR BLD AUTO: 11.1 %
MCH RBC QN AUTO: 31.4 PG (ref 27–33)
MCHC RBC AUTO-ENTMCNC: 33.7 G/DL (ref 32–36)
MCV RBC AUTO: 93.2 FL (ref 80–100)
MONOCYTES # BLD AUTO: 659 CELLS/UL (ref 200–950)
MONOCYTES NFR BLD AUTO: 5.4 %
NEUTROPHILS # BLD AUTO: 9394 CELLS/UL (ref 1500–7800)
NEUTROPHILS NFR BLD AUTO: 77 %
PLATELET # BLD AUTO: 351 THOUSAND/UL (ref 140–400)
PMV BLD REES-ECKER: 11.2 FL (ref 7.5–12.5)
RBC # BLD AUTO: 4.24 MILLION/UL (ref 3.8–5.1)
RUBV IGG SERPL IA-ACNC: 3.18 INDEX
T PALLIDUM AB SER QL IA: NEGATIVE
TSH SERPL-ACNC: 1.55 MIU/L
VZV IGG SER IA-ACNC: 8.63 S/CO
WBC # BLD AUTO: 12.2 THOUSAND/UL (ref 3.8–10.8)

## 2025-05-09 ENCOUNTER — OFFICE VISIT (OUTPATIENT)
Dept: PRIMARY CARE | Facility: CLINIC | Age: 40
End: 2025-05-09
Payer: COMMERCIAL

## 2025-05-09 ENCOUNTER — APPOINTMENT (OUTPATIENT)
Dept: PRIMARY CARE | Facility: CLINIC | Age: 40
End: 2025-05-09
Payer: COMMERCIAL

## 2025-05-09 ENCOUNTER — APPOINTMENT (OUTPATIENT)
Dept: LAB | Facility: HOSPITAL | Age: 40
End: 2025-05-09
Payer: COMMERCIAL

## 2025-05-09 VITALS
DIASTOLIC BLOOD PRESSURE: 72 MMHG | HEART RATE: 83 BPM | WEIGHT: 144 LBS | BODY MASS INDEX: 23.99 KG/M2 | HEIGHT: 65 IN | OXYGEN SATURATION: 99 % | SYSTOLIC BLOOD PRESSURE: 116 MMHG

## 2025-05-09 DIAGNOSIS — F41.8 ANXIETY WITH DEPRESSION: Primary | ICD-10-CM

## 2025-05-09 DIAGNOSIS — R53.82 CHRONIC FATIGUE: ICD-10-CM

## 2025-05-09 PROCEDURE — 1036F TOBACCO NON-USER: CPT | Performed by: PHYSICIAN ASSISTANT

## 2025-05-09 PROCEDURE — 3008F BODY MASS INDEX DOCD: CPT | Performed by: PHYSICIAN ASSISTANT

## 2025-05-09 PROCEDURE — 99213 OFFICE O/P EST LOW 20 MIN: CPT | Performed by: PHYSICIAN ASSISTANT

## 2025-05-09 RX ORDER — ESCITALOPRAM OXALATE 10 MG/1
10 TABLET ORAL DAILY
Qty: 90 TABLET | Refills: 1 | Status: SHIPPED | OUTPATIENT
Start: 2025-05-09 | End: 2025-11-05

## 2025-05-09 ASSESSMENT — PATIENT HEALTH QUESTIONNAIRE - PHQ9
SUM OF ALL RESPONSES TO PHQ9 QUESTIONS 1 AND 2: 0
2. FEELING DOWN, DEPRESSED OR HOPELESS: NOT AT ALL
1. LITTLE INTEREST OR PLEASURE IN DOING THINGS: NOT AT ALL

## 2025-05-09 ASSESSMENT — PAIN SCALES - GENERAL: PAINLEVEL_OUTOF10: 0-NO PAIN

## 2025-05-09 NOTE — PROGRESS NOTES
"Subjective   Patient ID: Taty Sosa is a 39 y.o. female who presents for med refills.    Presents for follow up -   Has had improvement in moods and anxiety since starting escitalopram. Currently taking 5mg with relief of symptoms, no major side effects. Currently 11 wks pregnant as well and is aware of data on SSRI during pregnancy. Currently benefit outweighs risk.   Denies CP, SOB.   Sleep is stable.   Exercise is limited due to high risk pregnancy         Review of Systems   All other systems reviewed and are negative.      Objective   /72   Pulse 83   Ht 1.651 m (5' 5\")   Wt 65.3 kg (144 lb)   SpO2 99%   BMI 23.96 kg/m²     Physical Exam  Constitutional:       Appearance: Normal appearance.   Cardiovascular:      Rate and Rhythm: Normal rate and regular rhythm.   Neurological:      Mental Status: She is alert.         Assessment/Plan   Diagnoses and all orders for this visit:  Anxiety with depression  -     escitalopram (Lexapro) 10 mg tablet; Take 1 tablet (10 mg) by mouth once daily.  Chronic fatigue    Recommend continued use of 5mg daily.   Would recommend through initial postpartum stages and follow up in 6-9 months.      "

## 2025-05-12 ENCOUNTER — HOSPITAL ENCOUNTER (OUTPATIENT)
Dept: RADIOLOGY | Facility: HOSPITAL | Age: 40
Discharge: HOME | End: 2025-05-12
Payer: COMMERCIAL

## 2025-05-12 ENCOUNTER — APPOINTMENT (OUTPATIENT)
Dept: OBSTETRICS AND GYNECOLOGY | Facility: CLINIC | Age: 40
End: 2025-05-12
Payer: COMMERCIAL

## 2025-05-12 DIAGNOSIS — O09.529 ANTEPARTUM MULTIGRAVIDA OF ADVANCED MATERNAL AGE (HHS-HCC): ICD-10-CM

## 2025-05-12 DIAGNOSIS — O09.819: ICD-10-CM

## 2025-05-12 PROCEDURE — 76813 OB US NUCHAL MEAS 1 GEST: CPT | Performed by: STUDENT IN AN ORGANIZED HEALTH CARE EDUCATION/TRAINING PROGRAM

## 2025-05-12 PROCEDURE — 76816 OB US FOLLOW-UP PER FETUS: CPT

## 2025-05-12 PROCEDURE — 76813 OB US NUCHAL MEAS 1 GEST: CPT

## 2025-05-13 ENCOUNTER — APPOINTMENT (OUTPATIENT)
Dept: SLEEP MEDICINE | Facility: CLINIC | Age: 40
End: 2025-05-13
Payer: COMMERCIAL

## 2025-05-13 DIAGNOSIS — G47.21 CIRCADIAN RHYTHM SLEEP DISORDER, DELAYED SLEEP PHASE TYPE: ICD-10-CM

## 2025-05-13 DIAGNOSIS — J34.2 DEVIATED NASAL SEPTUM: ICD-10-CM

## 2025-05-13 DIAGNOSIS — G47.52 REM BEHAVIORAL DISORDER: ICD-10-CM

## 2025-05-13 DIAGNOSIS — G25.81 RLS (RESTLESS LEGS SYNDROME): Primary | ICD-10-CM

## 2025-05-13 DIAGNOSIS — G47.19 EXCESSIVE DAYTIME SLEEPINESS: ICD-10-CM

## 2025-05-13 DIAGNOSIS — G47.33 OBSTRUCTIVE SLEEP APNEA SYNDROME: ICD-10-CM

## 2025-05-13 PROBLEM — G47.10 HYPERSOMNOLENCE DISORDER: Status: RESOLVED | Noted: 2024-02-08 | Resolved: 2025-05-13

## 2025-05-13 PROCEDURE — 99214 OFFICE O/P EST MOD 30 MIN: CPT | Performed by: INTERNAL MEDICINE

## 2025-05-13 PROCEDURE — 1036F TOBACCO NON-USER: CPT | Performed by: INTERNAL MEDICINE

## 2025-05-13 RX ORDER — ASPIRIN 81 MG/1
162 TABLET ORAL DAILY
COMMUNITY

## 2025-05-13 ASSESSMENT — SLEEP AND FATIGUE QUESTIONNAIRES
ESS-CHAD TOTAL SCORE: 13
HOW LIKELY ARE YOU TO NOD OFF OR FALL ASLEEP WHEN YOU ARE A PASSENGER IN A CAR FOR AN HOUR WITHOUT A BREAK: MODERATE CHANCE OF DOZING
HOW LIKELY ARE YOU TO NOD OFF OR FALL ASLEEP WHILE LYING DOWN TO REST IN THE AFTERNOON WHEN CIRCUMSTANCES PERMIT: HIGH CHANCE OF DOZING
HOW LIKELY ARE YOU TO NOD OFF OR FALL ASLEEP WHILE SITTING AND TALKING TO SOMEONE: WOULD NEVER DOZE
SITING INACTIVE IN A PUBLIC PLACE LIKE A CLASS ROOM OR A MOVIE THEATER: WOULD NEVER DOZE
HOW LIKELY ARE YOU TO NOD OFF OR FALL ASLEEP IN A CAR, WHILE STOPPED FOR A FEW MINUTES IN TRAFFIC: WOULD NEVER DOZE
HOW LIKELY ARE YOU TO NOD OFF OR FALL ASLEEP WHILE SITTING AND READING: HIGH CHANCE OF DOZING
HOW LIKELY ARE YOU TO NOD OFF OR FALL ASLEEP WHILE SITTING QUIETLY AFTER LUNCH WITHOUT ALCOHOL: MODERATE CHANCE OF DOZING
HOW LIKELY ARE YOU TO NOD OFF OR FALL ASLEEP WHILE WATCHING TV: HIGH CHANCE OF DOZING

## 2025-05-13 NOTE — PATIENT INSTRUCTIONS
Parasomnias    Parasomnia is a term for abnormal behaviors performed during sleep which includes sleep-walking, sleep-eating, and acting out dreams.     General guidelines for parasomnia include:  Make sure you and your bed partner are safe. Put pillows in between you and your bed partner if you have episodes of kicking or hitting while sleeping.  Alternatively, consider sleeping in separate beds or separate bedrooms from bed partner until symptoms are controlled.  Put up bed railings on each side to prevent falling from bed.   Place padding or cushion on the corners of furniture and around the bed.  Remove clutter and furniture from bedroom floor to avoid injuring yourself.  Lock bedroom doors and windows.  Hide your car keys.  In case of sleepwalking episodes, advise your bed partner to calmly lead you back to bed with a gentle voice. Avoid touching and grabbing.  If you find evidence of sleep eating, especially if you are gaining weight, consider locking your fridge and pantry at night.    Oral Appliance Therapy    Oral appliance therapy is a dental device that can be fabricated by a dental sleep medicine specialist.  Please contact and schedule with a dentist who is comfortable with making oral appliances.  Below are the names of suggested dental sleep medicine specialists: in the area:    Dr. Darian Marinelli  /[facility]: [phone] or [phone]       Dr. Krishna Linton  [facility]   [phone]    Dr. Pro Harvey  [place], [place]: [phone]    Dr. Federico Gilliam  [place], [place]: [phone]    Dr. Reinaldo Broderick, [place]: [phone]    Dr. Jeanette Waite, [phone]    Dr. Jerad Arora, [place]: [phone]    Dr. Gustavo Noguera, [place]: [phone]    Dr. Sy Long, [place]: [phone]    Dr. Rich Morillo, [place]: [phone]    [facility]:  Dr. Ramo Lewis    You can also check the American Academy of Dental Sleep  Medicine for additional recommendation of dentists that make appliances at: https://mms.aadsm.org/members/directory/search_bootstrap.php?org_id=ADSM.      Oral appliance therapy is typically covered by medical insurance as sleep apnea is a medical diagnoses and not a dental diagnosis. You can confirm coverage by calling your medical insurance and providing them with the following medical codes:  Diagnosis code: Obstructive Sleep Apnea G47.33  Procedure code: [id number]    After your appliance is made and adjusted, we like to make sure it is treating your sleep apnea effectively. Please return to our clinic at that time for follow up.

## 2025-05-13 NOTE — ASSESSMENT & PLAN NOTE
- Taty Sosa  has sleep apnea and requires treatment.  - Taty Sosa demonstrates good compliance and benefit from PAP therapy  - Change settings to auto PAP 8-12 cmH20, to accommodate need for potentially higher pressures during pregnancy.  Order chinstrap  She is interested in manage of advancement device we did discuss that we could give her options of [employer] dental sleep medicine certified dentist to make her dental appliance.  We also discussed inspire therapy as a potential treatment option but we are not ready for surgery surgical management at this time.  We did discuss that correction of deviated nasal septum may make tolerance of CPAP and sleep apnea therapies easier.  Will consider this in the future if she is currently pregnant  - Order for renewal of PAP supplies placed through [facility]    - Follow-up in 2 months

## 2025-05-13 NOTE — ASSESSMENT & PLAN NOTE
Peers to be due to need to increase CPAP pressures, need to add a chinstrap which mouth breathing is causing arousals.  Delayed circadian rhythm due to intermittent sleep schedule.

## 2025-05-13 NOTE — ASSESSMENT & PLAN NOTE
Did discuss to try to keep a consistent bedtime mostly between the hours of 10 PM to 6 AM. On early shifts she may need to wake up at 5 AM but can plan a nap after work.  On her late shifts she can take a nap prior to work

## 2025-05-13 NOTE — PROGRESS NOTES
Patient: Taty Sosa    [id number]  : 1985 -- AGE 39 y.o.    Provider: Ricky Zuñiga MD     Location [facility]   Service Date: 2025              [facility]  Followup Visit Note  Virtual or Telephone Consent  An interactive audio and video telecommunication system which permits real time communications between the patient (at the originating site) and provider (at the distant site) was utilized to provide this telehealth service.   Verbal consent was requested and obtained from Taty Sosa on this date, 25 for a telehealth visit and the patient's location was confirmed at the time of the visit.     Subjective   Patient ID: Taty Sosa is a 39 y.o. female who presents for Excessive Daytime Sleepiness, Sleep Apnea, and Pap Adherence Followup.  HPI    Current Sleep History:  Taty presents for review of sleep study and discuss management.  On 2025: Diagnostic polysomnography: Demonstrated best control on CPAP 10 cm H2O.  A chinstrap was recommended due to mouth breathing.  Also had evidence of REM REM sleep without atonia  On April 10, 2025: MSLT indicated mean sleep latency of 9 minutes with 0 sleep onset REM periods with 2nd and 3rd naps increasing sleep onset latency indicating delayed phase circadian rhythm disorder as cause of hypersomnolence     Work schedule is variable and her sleep times are inconsistent  Her schedules can change.  She would prefer to work in the morning  She is waking up every couple hours due to pregnancy. She is not using the chin strap.      Shifts start 6, 8, 9, 10 AM  Shift ends: 4:30 - 8:30 PM    Latest bedtime would be 10 PM  Earliest wake time 5 AM    She does not typically get RLS symptoms if she is sleep deprived    ESS: 13         Review of Systems  Review of systems negative except as per HPI  Objective   There were no vitals taken for this visit.   PREVIOUS  WEIGHTS:  Wt Readings from Last 3 Encounters:   05/09/25 65.3 kg (144 lb)   04/23/25 64 kg (141 lb 3.2 oz)   04/09/25 64 kg (141 lb 1.5 oz)     Physical Exam  PHYSICAL EXAM: GENERAL: alert pleasant and cooperative no acute distress  PSYCH EXAM: alert,oriented, in NAD with a full range of affect, normal behavior and no psychotic features    Lab Results   Component Value Date    IRON 64 09/30/2022    TIBC 361 09/30/2022    FERRITIN 72 02/26/2025        Assessment/Plan   Problem List Items Addressed This Visit           ICD-10-CM    Excessive daytime sleepiness G47.19    Peers to be due to need to increase CPAP pressures, need to add a chinstrap which mouth breathing is causing arousals.  Delayed circadian rhythm due to intermittent sleep schedule.           RLS (restless legs syndrome) - Primary G25.81    Currently on prenatal vitamin with iron.  Does not have significant issues with restless legs currently.  Will reevaluate in the future         Relevant Orders    Follow Up In Adult Sleep Medicine    REM behavioral disorder G47.52    Is unclear whether this is idiopathic or secondary.  However the latter was more likely this is due to medication and/or sleep apnea.  Will continue to monitor in the future.  Will provide education and her visit summary on safety measures to minimize unintentional injury or harm to self or others during sleep         Obstructive sleep apnea syndrome G47.33    - Taty Sosa  has sleep apnea and requires treatment.  - Taty Sosa demonstrates good compliance and benefit from PAP therapy  - Change settings to auto PAP 8-12 cmH20, to accommodate need for potentially higher pressures during pregnancy.  Order chinstrap  She is interested in manage of advancement device we did discuss that we could give her options of [employer] dental sleep medicine certified dentist to make her dental appliance.  We also discussed inspire therapy as a potential treatment option but we  are not ready for surgery surgical management at this time.  We did discuss that correction of deviated nasal septum may make tolerance of CPAP and sleep apnea therapies easier.  Will consider this in the future if she is currently pregnant  - Order for renewal of PAP supplies placed through [facility]    - Follow-up in 2 months          Relevant Orders    Positive Airway Pressure (PAP) Therapy    Follow Up In Adult Sleep Medicine    Deviated nasal septum J34.2    Need to consider management in the future due to some intolerance of CPAP         Circadian rhythm sleep disorder, delayed sleep phase type G47.21    Did discuss to try to keep a consistent bedtime mostly between the hours of 10 PM to 6 AM. On early shifts she may need to wake up at 5 AM but can plan a nap after work.  On her late shifts she can take a nap prior to work         Relevant Orders    Follow Up In Adult Sleep Medicine

## 2025-05-13 NOTE — ASSESSMENT & PLAN NOTE
Is unclear whether this is idiopathic or secondary.  However the latter was more likely this is due to medication and/or sleep apnea.  Will continue to monitor in the future.  Will provide education and her visit summary on safety measures to minimize unintentional injury or harm to self or others during sleep

## 2025-05-13 NOTE — ASSESSMENT & PLAN NOTE
Currently on prenatal vitamin with iron.  Does not have significant issues with restless legs currently.  Will reevaluate in the future

## 2025-05-19 ENCOUNTER — ROUTINE PRENATAL (OUTPATIENT)
Dept: OBSTETRICS AND GYNECOLOGY | Facility: CLINIC | Age: 40
End: 2025-05-19
Payer: COMMERCIAL

## 2025-05-19 VITALS — DIASTOLIC BLOOD PRESSURE: 80 MMHG | WEIGHT: 144 LBS | SYSTOLIC BLOOD PRESSURE: 121 MMHG | BODY MASS INDEX: 23.96 KG/M2

## 2025-05-19 DIAGNOSIS — O26.899 HEADACHE IN PREGNANCY, ANTEPARTUM (HHS-HCC): ICD-10-CM

## 2025-05-19 DIAGNOSIS — O09.529 ANTEPARTUM MULTIGRAVIDA OF ADVANCED MATERNAL AGE (HHS-HCC): ICD-10-CM

## 2025-05-19 DIAGNOSIS — R51.9 HEADACHE IN PREGNANCY, ANTEPARTUM (HHS-HCC): ICD-10-CM

## 2025-05-19 DIAGNOSIS — Z3A.12 12 WEEKS GESTATION OF PREGNANCY (HHS-HCC): ICD-10-CM

## 2025-05-19 DIAGNOSIS — O09.819: Primary | ICD-10-CM

## 2025-05-19 DIAGNOSIS — M99.09 SOMATIC DYSFUNCTON OF OTHER REGION: ICD-10-CM

## 2025-05-19 LAB
POC APPEARANCE, URINE: CLEAR
POC BILIRUBIN, URINE: NEGATIVE
POC BLOOD, URINE: NEGATIVE
POC COLOR, URINE: YELLOW
POC GLUCOSE, URINE: NEGATIVE MG/DL
POC KETONES, URINE: NEGATIVE MG/DL
POC LEUKOCYTES, URINE: NEGATIVE
POC NITRITE,URINE: NEGATIVE
POC PH, URINE: 6 PH
POC PROTEIN, URINE: NEGATIVE MG/DL
POC SPECIFIC GRAVITY, URINE: 1.02
POC UROBILINOGEN, URINE: 0.2 EU/DL

## 2025-05-19 PROCEDURE — 0502F SUBSEQUENT PRENATAL CARE: CPT | Performed by: OBSTETRICS & GYNECOLOGY

## 2025-05-19 PROCEDURE — 81003 URINALYSIS AUTO W/O SCOPE: CPT | Mod: QW | Performed by: OBSTETRICS & GYNECOLOGY

## 2025-05-19 ASSESSMENT — PAIN - FUNCTIONAL ASSESSMENT: PAIN_FUNCTIONAL_ASSESSMENT: 0-10

## 2025-05-19 ASSESSMENT — ENCOUNTER SYMPTOMS
LOSS OF SENSATION IN FEET: 0
DEPRESSION: 0
OCCASIONAL FEELINGS OF UNSTEADINESS: 0

## 2025-05-19 ASSESSMENT — PAIN SCALES - GENERAL: PAINLEVEL_OUTOF10: 0 - NO PAIN

## 2025-05-19 NOTE — PROGRESS NOTES
"Subjective   Patient ID [id number]   Taty Sosa is a 39 y.o.  at 12w5d with a working estimated date of delivery of 2025, by Embryo Transfer who presents for a routine prenatal visit. She denies vaginal bleeding, leakage of fluid, pelvic pain only focused to inguinal, right side, occasional.    Her pregnancy is complicated by:  IVF  AMA    Objective   Physical Exam  Weight: 65.3 kg (144 lb), Pregravid BMI: 23.13  Expected Total Weight Gain: 11.5 kg (25 lb)-16 kg (35 lb)   BP: 121/80  Fetal Heart Rate: 150      Prenatal Labs  Urine dip:  Lab Results   Component Value Date    KETONESU NEGATIVE 2025     Lab Results   Component Value Date    HGB 13.3 2025    HCT 39.5 2025    ABO A 2025    HEPBSAG NON-REACTIVE 2025     No results found for: \"PAPPA\", \"AFP\", \"HCG\", \"ESTRIOL\", \"INHBA\"    Imaging  Normal NT screen  Normal CfDNA       Assessment/Plan   Diagnoses and all orders for this visit:  Pregnancy, high-risk, assisted reproductive technology ([facility])  -     POCT UA Automated manually resulted  Antepartum multigravida of advanced maternal age ([facility])  12 weeks gestation of pregnancy ([facility])  Headache in pregnancy, antepartum ([facility])  -     Referral to [facility]; Future  Somatic dysfuncton of other region  -     Referral to [facility]; Future    Continue prenatal vitamin.  Labs reviewed.  Order placed for anatomy scan at 20 weeks.  Follow up in 4 weeks for a routine prenatal visit.  "

## 2025-05-20 LAB
COMMENTS - MP RESULT TYPE: NORMAL
SCAN RESULT: NORMAL

## 2025-06-05 ENCOUNTER — PATIENT MESSAGE (OUTPATIENT)
Dept: OBSTETRICS AND GYNECOLOGY | Facility: CLINIC | Age: 40
End: 2025-06-05
Payer: COMMERCIAL

## 2025-06-06 DIAGNOSIS — O26.899 HEADACHE IN PREGNANCY, ANTEPARTUM (HHS-HCC): Primary | ICD-10-CM

## 2025-06-06 DIAGNOSIS — R51.9 HEADACHE IN PREGNANCY, ANTEPARTUM (HHS-HCC): Primary | ICD-10-CM

## 2025-06-06 RX ORDER — CYCLOBENZAPRINE HCL 10 MG
10 TABLET ORAL NIGHTLY PRN
Qty: 90 TABLET | Refills: 2 | Status: SHIPPED | OUTPATIENT
Start: 2025-06-06 | End: 2025-07-06

## 2025-06-06 NOTE — PROGRESS NOTES
Patient complaining of recurring significant migraines since the beginning of the pregnancy.  Now 15 weeks.  Magnesium did help for a while, but she is now having recurrent headaches again.  Sent cyclobenzaprine to take daily at bedtime as needed followed by magnesium each morning.  If this does not improve her symptoms, advised her to schedule a telehealth to discuss the next steps.

## 2025-06-23 DIAGNOSIS — G43.C0 PERIODIC HEADACHE SYNDROME, NOT INTRACTABLE: Primary | ICD-10-CM

## 2025-06-23 DIAGNOSIS — O26.899 HEADACHE IN PREGNANCY, ANTEPARTUM (HHS-HCC): ICD-10-CM

## 2025-06-23 DIAGNOSIS — R51.9 HEADACHE IN PREGNANCY, ANTEPARTUM (HHS-HCC): ICD-10-CM

## 2025-07-01 ENCOUNTER — TELEMEDICINE (OUTPATIENT)
Dept: OBSTETRICS AND GYNECOLOGY | Facility: CLINIC | Age: 40
End: 2025-07-01
Payer: COMMERCIAL

## 2025-07-01 DIAGNOSIS — O26.899 HEADACHE IN PREGNANCY, ANTEPARTUM (HHS-HCC): ICD-10-CM

## 2025-07-01 DIAGNOSIS — F41.8 ANXIETY WITH DEPRESSION: ICD-10-CM

## 2025-07-01 DIAGNOSIS — R51.9 HEADACHE IN PREGNANCY, ANTEPARTUM (HHS-HCC): ICD-10-CM

## 2025-07-01 DIAGNOSIS — Z86.69 HISTORY OF MIGRAINE HEADACHES: ICD-10-CM

## 2025-07-01 DIAGNOSIS — G43.819 OTHER MIGRAINE WITHOUT STATUS MIGRAINOSUS, INTRACTABLE: Primary | ICD-10-CM

## 2025-07-01 PROCEDURE — 99213 OFFICE O/P EST LOW 20 MIN: CPT | Performed by: OBSTETRICS & GYNECOLOGY

## 2025-07-01 RX ORDER — VENLAFAXINE HYDROCHLORIDE 75 MG/1
75 CAPSULE, EXTENDED RELEASE ORAL DAILY
Qty: 30 CAPSULE | Refills: 11 | Status: SHIPPED | OUTPATIENT
Start: 2025-07-01 | End: 2026-07-01

## 2025-07-01 RX ORDER — CYCLOBENZAPRINE HCL 10 MG
TABLET ORAL
COMMUNITY
Start: 2025-07-01

## 2025-07-01 RX ORDER — ELETRIPTAN HYDROBROMIDE 20 MG/1
20 TABLET, FILM COATED ORAL ONCE AS NEEDED
Qty: 6 TABLET | Refills: 0 | Status: SHIPPED | OUTPATIENT
Start: 2025-07-01 | End: 2026-07-01

## 2025-07-01 NOTE — PATIENT INSTRUCTIONS
-Sending order for Dr. Russell and group in [place] [facility]. 5 months is unacceptable!  The patient states she has seen him and about 3 years.  -Patient at risk for job loss. Will approve intermittent leave until evaluated and treated by neurologist.  - She may use a second tablet of Flexeril midday on nonworking days.  She is taking the Flexeril daily at bedtime with magnesium supplementation.  - She may use eletriptan for as needed use for unrelenting migraine.  She understands that it is category C for pregnancy and lactation.  Reviewed with this category.  - Switching from Lexapro to Effexor, a known migraine prophylaxis.  She will still be treated for anxiety and depression.  - Advised her to schedule physical therapy for dry needling therapy and deep tissue.

## 2025-07-01 NOTE — PROGRESS NOTES
Consent:  Verbal consent was requested and obtained from patient on this date for a telehealth visit to determine if a office visit would be necessary for the patient's complaint(s).      Referring Physician: No referring provider defined for this encounter.  Primary Care Provider: Gustavo Martin PA-C     Subjective    HPI:  38 yo CF at 18.6 weeks with intractable migraine that is resistant to magnesium and flexeril prophylaxis. She completed session from massotherapy and states that it was not effective.  Flexeril does help when she takes it at bedtime but the migraine returns during the day while working.  It is too severe to stay in the working environment.  She has hx of migraines prepregnancy.  She had tried and failed several therapies but really did well on Ubrelvy.  This pregnancy was achieved fertility assistance.  She is so worried about losing her job because her migraines are so severe she has to leave the environment.  She has been using PTO and is running out she is requesting intermittently.  Referral was placed for her previously for neurology consultation and she has not been able to get into a provider until February 2026.  She used central scheduling and this was the best that they can do.  When asked she did recall having visited the group in [place] (Dr. Russell) in the past, approximately 3 years ago.  She states that she has tried as needed Fioricet prepregnancy and it caused mood exacerbation.    She is on Lexapro for her anxiety, 10 mg daily.      Medical History[1]     Surgical History[2]     Social History[3]     Current Outpatient Medications   Medication Instructions    albuterol 90 mcg/actuation inhaler 2 puffs, inhalation, Every 4 hours PRN    aspirin 162 mg, Daily    calcium carbonate (Tums) 200 mg calcium chewable tablet 1 tablet, As needed    cyclobenzaprine (FLEXERIL) 10 mg, oral, Nightly PRN, For headaches.    escitalopram (LEXAPRO) 10 mg, oral, Daily    loratadine  (CLARITIN) 10 mg, Daily    polyethylene glycol (MIRALAX) 17 g, As needed      Objective    BSA: There is no height or weight on file to calculate BSA.  There were no vitals taken for this visit.     Physical Exam  General: AAOx3 in NAD   Psych: She is tearful, frustrated.  She is able to consent.     Assessment/Plan      Diagnoses and all orders for this visit:  Other migraine without status migrainosus, intractable  -     venlafaxine XR (Effexor XR) 75 mg 24 hr capsule; Take 1 capsule (75 mg) by mouth once daily. Do not crush or chew.  -     Referral to Physical Therapy; Future  -     eletriptan (Relpax) 20 mg tablet; Take 1 tablet (20 mg) by mouth 1 time if needed for migraine. May repeat in 2 hours if unresolved. Do not exceed 80 mg in 24 hours.  -     Referral to Neurology; Future  Anxiety with depression  Headache in pregnancy, antepartum ([facility])  -     venlafaxine XR (Effexor XR) 75 mg 24 hr capsule; Take 1 capsule (75 mg) by mouth once daily. Do not crush or chew.  -     Referral to Physical Therapy; Future  -     eletriptan (Relpax) 20 mg tablet; Take 1 tablet (20 mg) by mouth 1 time if needed for migraine. May repeat in 2 hours if unresolved. Do not exceed 80 mg in 24 hours.  -     Referral to Neurology; Future  History of migraine headaches  -     eletriptan (Relpax) 20 mg tablet; Take 1 tablet (20 mg) by mouth 1 time if needed for migraine. May repeat in 2 hours if unresolved. Do not exceed 80 mg in 24 hours.  -     Referral to Neurology; Future       Treatment Plans    -Sending order for Dr. Russell and group in [facility]. 5 months is unacceptable!  The patient states she has seen him and about 3 years.  -Patient at risk for job loss. Will approve intermittent leave until evaluated and treated by neurologist.  - She may use a second tablet of Flexeril midday on nonworking days.  She is taking the Flexeril daily at bedtime with magnesium supplementation.  - She may use eletriptan for as  needed use for unrelenting migraine.  She understands that it is category C for pregnancy and lactation.  Reviewed with this category.  - Switching from Lexapro to Effexor, a known migraine prophylaxis.  She will still be treated for anxiety and depression.  - Advised her to schedule physical therapy for dry needling therapy and deep tissue.   - Patient verbalized understanding of the plan of care.     All new and/or current medications discussed and reviewed including side effects with patient/caregiver, Understanding:  Caregiver/Patient expressed understanding., Adherence:  Barriers to adherence identified and discussed if present.                     [1]   Past Medical History:  Diagnosis Date    Anxiety     Asthma     exercise induced    Depression     GERD (gastroesophageal reflux disease)     symptomatic    HPV (human papilloma virus) infection     Inappropriate sinus node tachycardia     Migraine     CHUCK on CPAP     Palpitations     Pericarditis ([facility])     15 years ago after wisdom tooth extraction    Primary central sleep apnea     PTSD (post-traumatic stress disorder)     Restless leg syndrome     Vitamin D deficiency    [2]   Past Surgical History:  Procedure Laterality Date    ESOPHAGOGASTRODUODENOSCOPY      HERNIA REPAIR      HYSTEROSCOPY  10/31/2024    LAPAROSCOPIC NISSEN FUNDOPLICATION  08/07/2017    OTHER SURGICAL HISTORY  01/12/2016    EP Study    OTHER SURGICAL HISTORY      egg retrieval    WISDOM TOOTH EXTRACTION     [3]   Social History  Tobacco Use    Smoking status: Never     Passive exposure: Never    Smokeless tobacco: Never   Vaping Use    Vaping status: Former   Substance Use Topics    Alcohol use: Not Currently     Comment: pregnant    Drug use: Never

## 2025-07-02 ENCOUNTER — HOSPITAL ENCOUNTER (OUTPATIENT)
Dept: RADIOLOGY | Facility: CLINIC | Age: 40
Discharge: HOME | End: 2025-07-02
Payer: COMMERCIAL

## 2025-07-02 DIAGNOSIS — O09.819: ICD-10-CM

## 2025-07-02 DIAGNOSIS — O09.529 ANTEPARTUM MULTIGRAVIDA OF ADVANCED MATERNAL AGE (HHS-HCC): ICD-10-CM

## 2025-07-02 PROCEDURE — 76811 OB US DETAILED SNGL FETUS: CPT | Performed by: OBSTETRICS & GYNECOLOGY

## 2025-07-02 PROCEDURE — 76811 OB US DETAILED SNGL FETUS: CPT

## 2025-07-16 ENCOUNTER — APPOINTMENT (OUTPATIENT)
Dept: SLEEP MEDICINE | Facility: CLINIC | Age: 40
End: 2025-07-16
Payer: COMMERCIAL

## 2025-07-24 ENCOUNTER — EVALUATION (OUTPATIENT)
Dept: PHYSICAL THERAPY | Facility: CLINIC | Age: 40
End: 2025-07-24
Payer: COMMERCIAL

## 2025-07-24 ENCOUNTER — ROUTINE PRENATAL (OUTPATIENT)
Dept: OBSTETRICS AND GYNECOLOGY | Facility: CLINIC | Age: 40
End: 2025-07-24
Payer: COMMERCIAL

## 2025-07-24 VITALS — DIASTOLIC BLOOD PRESSURE: 72 MMHG | WEIGHT: 157.6 LBS | SYSTOLIC BLOOD PRESSURE: 126 MMHG | BODY MASS INDEX: 26.23 KG/M2

## 2025-07-24 DIAGNOSIS — R82.71 BACTERIURIA, ASYMPTOMATIC IN PREGNANCY (HHS-HCC): ICD-10-CM

## 2025-07-24 DIAGNOSIS — O09.529 ANTEPARTUM MULTIGRAVIDA OF ADVANCED MATERNAL AGE (HHS-HCC): ICD-10-CM

## 2025-07-24 DIAGNOSIS — R51.9 HEADACHE IN PREGNANCY, ANTEPARTUM (HHS-HCC): ICD-10-CM

## 2025-07-24 DIAGNOSIS — M79.10 TRIGGER POINT: ICD-10-CM

## 2025-07-24 DIAGNOSIS — O99.891 BACTERIURIA, ASYMPTOMATIC IN PREGNANCY (HHS-HCC): ICD-10-CM

## 2025-07-24 DIAGNOSIS — O26.899 HEADACHE IN PREGNANCY, ANTEPARTUM (HHS-HCC): ICD-10-CM

## 2025-07-24 DIAGNOSIS — R07.81 RIB PAIN ON RIGHT SIDE: ICD-10-CM

## 2025-07-24 DIAGNOSIS — Z3A.22 22 WEEKS GESTATION OF PREGNANCY (HHS-HCC): Primary | ICD-10-CM

## 2025-07-24 DIAGNOSIS — M99.08 SOMATIC DYSFUNCTION OF RIB: ICD-10-CM

## 2025-07-24 DIAGNOSIS — G43.819 OTHER MIGRAINE WITHOUT STATUS MIGRAINOSUS, INTRACTABLE: ICD-10-CM

## 2025-07-24 DIAGNOSIS — G44.89 OTHER HEADACHE SYNDROME: Primary | ICD-10-CM

## 2025-07-24 DIAGNOSIS — O09.819: ICD-10-CM

## 2025-07-24 LAB
POC APPEARANCE, URINE: CLEAR
POC BILIRUBIN, URINE: NEGATIVE
POC BLOOD, URINE: NEGATIVE
POC COLOR, URINE: YELLOW
POC GLUCOSE, URINE: NEGATIVE MG/DL
POC KETONES, URINE: NEGATIVE MG/DL
POC LEUKOCYTES, URINE: NEGATIVE
POC NITRITE,URINE: NEGATIVE
POC PH, URINE: 7 PH
POC PROTEIN, URINE: NEGATIVE MG/DL
POC SPECIFIC GRAVITY, URINE: 1.01
POC UROBILINOGEN, URINE: 0.2 EU/DL

## 2025-07-24 PROCEDURE — 81003 URINALYSIS AUTO W/O SCOPE: CPT | Performed by: OBSTETRICS & GYNECOLOGY

## 2025-07-24 PROCEDURE — 98925 OSTEOPATH MANJ 1-2 REGIONS: CPT | Performed by: OBSTETRICS & GYNECOLOGY

## 2025-07-24 PROCEDURE — 97162 PT EVAL MOD COMPLEX 30 MIN: CPT | Mod: GP | Performed by: PHYSICAL THERAPIST

## 2025-07-24 SDOH — ECONOMIC STABILITY: GENERAL: QUALITY OF LIFE: GOOD

## 2025-07-24 ASSESSMENT — ENCOUNTER SYMPTOMS
QUALITY: DISCOMFORT
QUALITY: PRESSURE
DEPRESSION: 0
PAIN SCALE AT HIGHEST: 10
PAIN LOCATION: HEADACHE
OCCASIONAL FEELINGS OF UNSTEADINESS: 0
PAIN SCALE AT LOWEST: 0
LOSS OF SENSATION IN FEET: 0
QUALITY: THROBBING
PAIN SCALE: 2

## 2025-07-24 ASSESSMENT — LIFESTYLE VARIABLES
HOW OFTEN DO YOU HAVE A DRINK CONTAINING ALCOHOL: NEVER
HOW OFTEN DO YOU HAVE SIX OR MORE DRINKS ON ONE OCCASION: NEVER
AUDIT-C TOTAL SCORE: 0
SKIP TO QUESTIONS 9-10: 1
HOW MANY STANDARD DRINKS CONTAINING ALCOHOL DO YOU HAVE ON A TYPICAL DAY: PATIENT DOES NOT DRINK

## 2025-07-24 ASSESSMENT — PAIN - FUNCTIONAL ASSESSMENT: PAIN_FUNCTIONAL_ASSESSMENT: 0-10

## 2025-07-24 ASSESSMENT — ACTIVITIES OF DAILY LIVING (ADL): POOR_BALANCE: 1

## 2025-07-24 ASSESSMENT — PAIN SCALES - GENERAL: PAINLEVEL_OUTOF10: 5 - MODERATE PAIN

## 2025-07-24 NOTE — PROGRESS NOTES
Physical Therapy Evaluation and Treatment     Patient Name: Taty Sosa  MRN: [id number]  Encounter date: 7/24/2025  Time Calculation  Start Time: 0819  Stop Time: 0900  Time Calculation (min): 41 min  PT Evaluation Time Entry  PT Evaluation (Moderate) Time Entry: 41  Moderate complexity due to patient's clinical presentation being evolving with changing characteristics, with comorbidities/complexities to include chronic migraine, high risk pregnancy, all of which may negatively impact rehab tolerance and progression.     Visit # 1 of 16  Visits/Dates Authorized: AUTH REQUIRED/$25 CO-PAY/100% COVERAGE/30 VISITS (0 USED)/ $1600 OOP (NOT MET)   Insurance Type: Payor:  EMPLOYEE MEDICAL PLAN / Plan:  EMPLOYEE MEDICAL PLAN TRADITIONAL  / Product Type: *No Product type* /     Current Problem:   Problem List Items Addressed This Visit           ICD-10-CM    Other headache syndrome - Primary G44.89    Relevant Orders    Follow Up In Physical Therapy     Precautions:   Pregnancy        Subjective    Subjective Evaluation    History of Present Illness  Mechanism of injury: Pt reports a chronic history of migraines since childhood but has increased since being pregnant. She is very familiar with her triggers and the worst is the weather. She is not on as much medication due to the pregnancy. She reports that she is now getting them a couple times per week, a couple weeks ago she was getting them 5x per week. She does report that they start in the neck/shoulder region and travel into the head. She does not report aura but there is sensitivity to light, smell and sounds. Especially sensitive to smells. When they occur they can last a full day, sometimes they last through the night and on rare times will wake up in the middle of the night. She has not tried any therapy prior to the pregnancy. She did discuss botox with her neurologist but will not occur until after pregnancy. Prior to pregnancy she was very active with  running, biking, hiking and weight lifting due to high risk pregnancy.   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     Quality of life: good    Pain  Current pain ratin  At best pain ratin  At worst pain rating: 10  Location: Headache  Quality: throbbing, discomfort and pressure    Diagnostic Tests  No diagnostic tests performed    Treatments  No previous or current treatments  Patient Goals  Patient goals for therapy: decreased pain  Patient goal: Reduce headaches         Objective        Objective     Palpation   Left   Hypertonic in the cervical paraspinals, middle trapezius, scalenes, suboccipitals and upper trapezius.   Tenderness of the cervical paraspinals, middle trapezius, scalenes, suboccipitals and upper trapezius.     Right   Hypertonic in the cervical paraspinals, middle trapezius, scalenes, suboccipitals and upper trapezius. Tenderness of the cervical paraspinals, middle trapezius, scalenes, suboccipitals and upper trapezius.     Additional Palpation Details  HTrP: greater occipital, lesser occipital, least occipital, supraorbital and trigeminal points B, infraspinatus pressure not has tender as occipital and trigeminal points     Active Range of Motion   Cervical/Thoracic Spine   Normal active range of motion    Passive Range of Motion   Cervical/Thoracic Spine   Normal passive range of motion         Outcome Measures:        Treatments:   STM along suboccpitials, scalenes with subocciptial release -- pt reporting reduction in muscle tension however increase in nerve sensitivity   Educated on dry needling in relation to migraine and need for slower progression due to sensitivity     HEP / Access Codes: Will provide at first follow up    Assessment   Assessment & Plan     Assessment  Impairments: abnormal gait, abnormal muscle firing, abnormal muscle tone, abnormal or restricted ROM, activity intolerance, impaired balance, impaired physical strength, lacks appropriate home exercise program and pain with  function  Assessment details: Pt is a 39 y.o female presenting to therapy with headaches. Pt demonstrating normal cervical ROM however significant muscle tension present throughout cervical paraspinals as described above creating increased pull/strain on neural structures. Pt also demonstrating high sensitivity along nerve points suggestive of nervous system hypersensitivity/activation in relation to migraines. At this time pt would benefit from skilled physical therapy in order to prevent further functional decline and head ache frequency.    Prognosis: good    Goals  Reduce headaches    Plan  Therapy options: will be seen for skilled physical therapy services  Planned modality interventions: TENS, electrical stimulation/Russian stimulation and traction  Other planned modality interventions: KT Tape/Dry Needling/Cupping  Planned therapy interventions: abdominal trunk stabilization, manual therapy, motor coordination training, balance/weight-bearing training, neuromuscular re-education, body mechanics training, postural training, fine motor coordination training, soft tissue mobilization, flexibility, spinal/joint mobilization, functional ROM exercises, strengthening, gait training, stretching, therapeutic activities, home exercise program, transfer training and joint mobilization  Frequency: 2x week  Duration in visits: 16  Duration in weeks: 8  Treatment plan discussed with: patient  Plan details: Slow progression of manual therapy and dry needling due to high sensitivity along nerve points and muscle           Goals:   Active       PT Problem       PT Goal 1 - STG       Start:  07/24/25    Expected End:  08/21/25       Pt will be 50% IND with HEP in 4 weeks in order to progress with therapy.  Pt will reduce headache frequency to no more than 1 times per week in 4 weeks order to improve sleep and work tasks   Pt will reduce headache intensity to no more than 4/10 in 4 weeks to improve sleep and work tasks           PT Goal 2 - LTG       Start:  07/24/25    Expected End:  09/18/25       Pt will be 100% IND with HEP in 8 weeks in order to maintain progress with therapy.   Pt will reduce headache frequency to no more than 2 times per month in order to improve sleep and work tasks.  Pt will reduce headache intensity to no more than 2/10 in 8 weeks to improve sleep and work tasks.

## 2025-07-26 LAB — BACTERIA UR CULT: NORMAL

## 2025-07-29 DIAGNOSIS — R51.9 HEADACHE IN PREGNANCY, ANTEPARTUM (HHS-HCC): ICD-10-CM

## 2025-07-29 DIAGNOSIS — O26.899 HEADACHE IN PREGNANCY, ANTEPARTUM (HHS-HCC): ICD-10-CM

## 2025-07-29 DIAGNOSIS — G43.819 OTHER MIGRAINE WITHOUT STATUS MIGRAINOSUS, INTRACTABLE: ICD-10-CM

## 2025-07-29 DIAGNOSIS — Z86.69 HISTORY OF MIGRAINE HEADACHES: ICD-10-CM

## 2025-07-29 RX ORDER — ELETRIPTAN HYDROBROMIDE 20 MG/1
20 TABLET, FILM COATED ORAL ONCE AS NEEDED
Qty: 6 TABLET | Refills: 0 | Status: SHIPPED | OUTPATIENT
Start: 2025-07-29 | End: 2026-07-29

## 2025-07-30 NOTE — PROGRESS NOTES
"Subjective   Patient ID [id number]   Taty Sosa is a 40 y.o.  at 23w0d with a working estimated date of delivery of 2025, by Embryo Transfer who presents for a routine prenatal visit. She denies vaginal bleeding, leakage of fluid, decreased fetal movements, or contractions.  Saw neurologist, Dr. Russell.  No evaluation -he declined to order diagnostic imaging in pregnancy.  She states that her headaches are a little less often than in the first and really her second trimester.  She has had to use her magnesium daily and migraine medications.  Flexeril also occasional use.  She complains of a chronic constant pain over her ribs under her right breast.  Not deep but more superficial and is more uncomfortable with certain position changes.    Her pregnancy is complicated by:  IVF  AMA  Migraine headaches    Objective   Physical Exam  Weight: 71.5 kg (157 lb 9.6 oz)  Expected Total Weight Gain: 11.5 kg (25 lb)-16 kg (35 lb)   Pregravid BMI: 23.13  BP: 126/72  Fetal Heart Rate: 145 Fundal Height (cm): 23 cm    Prenatal Labs  Urine dip:  Lab Results   Component Value Date    KETONESU NEGATIVE 2025       Lab Results   Component Value Date    HGB 13.3 2025    HCT 39.5 2025    ABO A 2025    HEPBSAG NON-REACTIVE 2025     No results found for: \"PAPPA\", \"AFP\", \"HCG\", \"ESTRIOL\", \"INHBA\"  No results found for: \"GLUF\", \"GLUT1\", \"SUNSJJF4HW\", \"CLORIYY0AN\"      Assessment/Plan   Diagnoses and all orders for this visit:  22 weeks gestation of pregnancy ([facility])  Pregnancy, high-risk, assisted reproductive technology ([facility])  -     POCT UA Automated manually resulted  -     Urine Culture  -     Glucose, 1 Hour Screen, Pregnancy; Future  -     CBC; Future  Antepartum multigravida of advanced maternal age ([facility])  -     POCT UA Automated manually resulted  -     Glucose, 1 Hour Screen, Pregnancy; Future  -     CBC; Future  Headache in pregnancy, antepartum ([facility])  -     POCT UA " Automated manually resulted  -     Urine Culture  Other migraine without status migrainosus, intractable  Bacteriuria, asymptomatic in pregnancy ([facility])  -     Urine Culture  Somatic dysfunction of rib  Trigger point  Rib pain on right side    With permission, OMT performed for her complaints.  See separate note.  She is supposed to follow-up with neurology postpartum.  If migraines become an uncontrollable problem again, order the MRI.  Continue prenatal vitamin.  Labs reviewed.  Follow up in 3-4 weeks for a routine prenatal visit.

## 2025-07-30 NOTE — PROGRESS NOTES
Osteopathic Manipulation (OMM)    Chief Complaint:  Pain under right breast. No N/V. No lumps or masses.  Timing: several days  Relief aids? No  History of Trauma? No     A osteopathic screen was performed due to the patient's somatic complaints.  The risks benefits alternatives were reviewed for her elective and or therapeutic procedure including failure to cure her pain or dysfunction, need for further therapy.  The patient verbalized understanding of all of the above and consented to proceed.    Findings:   The thoracic cage had decreased motion as the ribs were fixed in exhalation, especially near the sternum under right breast  RUQ exam is negative for tenderness.   Decreased diaphragmatic motion with inhalation.  Increased paraspinal muscle tension of right side    OMM performed with permission:    Muscle energy applied to the ___ diaphragm.  Increased excursion resulted.  Improved inhalation/exhalation of the thoracic cage with improved respirations.  Muscle energy applied to bilateral iliofemoral joints for symmetry.    Muscle energy applied to the pubic symphysis.  Exercises demonstrated for home maintenance.    Fascial ligamentous release applied to the sacrum/ilium  Following the treatment bilateral lower extremity symmetry obtained and increased range of motion in both hips.    There was decreased tenderness of the pubis with increased range of motion upon reexamination.

## 2025-07-31 ENCOUNTER — PATIENT MESSAGE (OUTPATIENT)
Dept: OBSTETRICS AND GYNECOLOGY | Facility: CLINIC | Age: 40
End: 2025-07-31
Payer: COMMERCIAL

## 2025-08-01 ENCOUNTER — TREATMENT (OUTPATIENT)
Dept: PHYSICAL THERAPY | Facility: CLINIC | Age: 40
End: 2025-08-01
Payer: COMMERCIAL

## 2025-08-01 DIAGNOSIS — G44.89 OTHER HEADACHE SYNDROME: ICD-10-CM

## 2025-08-01 PROCEDURE — 20560 NDL INSJ W/O NJX 1 OR 2 MUSC: CPT | Mod: GP | Performed by: PHYSICAL THERAPIST

## 2025-08-01 NOTE — PROGRESS NOTES
"Physical Therapy Treatment    Patient Name: Taty Sosa  MRN: [id number]  Today's Date: 8/1/2025  Time Calculation  Start Time: 0950  Stop Time: 1020  Time Calculation (min): 30 min  PT Therapeutic Procedures Time Entry  Needle Insertion w/o Injection 1 or 2: 20    Insurance:  Visit number: 2 of 16  Authorization info: Awaiting auth \"ok to see per insurance department\"   Insurance Type: Payor:  EMPLOYEE MEDICAL PLAN / Plan:  EMPLOYEE MEDICAL PLAN TRADITIONAL  / Product Type: *No Product type* /     Current Problem   1. Other headache syndrome  Follow Up In Physical Therapy          Subjective   General    Pt reports she had a mild headache post manual therapy last session, does not report any headache today.   Precautions:   Pregnancy 23 weeks   Pain    0  Post Treatment Pain Level 0    Objective   R > L cervical paraspinal tension     Treatments:    Therapeutic activity:   IDN performed this date. Pt educated on risks and benefits of dry needling. Pt provided verbal consent. All universal precautions followed.    1 - 30  mm greater occipital B  1 - 30 mm least occipital B  1 - 30 mm C3-5 paraspinals B     No adverse response. All IDN guidelines and safety protocols followed.      Performed in seated position     Assessment   Assessment:    Pt tolerated session well, gentle initiation of dry needling to reduce cervicogenic components to migraines with good initial response, pt reporting reduction in tension with \"lighter\" feeling in muscles. Will assess benefits next session.     Plan:    See above     OP EDUCATION:   Educated on dry needling     Goals:   Active       PT Problem       PT Goal 1 - STG       Start:  07/24/25    Expected End:  08/21/25       Pt will be 50% IND with HEP in 4 weeks in order to progress with therapy.  Pt will reduce headache frequency to no more than 1 times per week in 4 weeks order to improve sleep and work tasks   Pt will reduce headache intensity to no more than 4/10 in 4 " weeks to improve sleep and work tasks          PT Goal 2 - LTG       Start:  07/24/25    Expected End:  09/18/25       Pt will be 100% IND with HEP in 8 weeks in order to maintain progress with therapy.   Pt will reduce headache frequency to no more than 2 times per month in order to improve sleep and work tasks.  Pt will reduce headache intensity to no more than 2/10 in 8 weeks to improve sleep and work tasks.

## 2025-08-06 ENCOUNTER — OFFICE VISIT (OUTPATIENT)
Dept: DERMATOLOGY | Facility: CLINIC | Age: 40
End: 2025-08-06
Payer: COMMERCIAL

## 2025-08-06 DIAGNOSIS — I78.1 NEVUS, NON-NEOPLASTIC: Primary | ICD-10-CM

## 2025-08-06 DIAGNOSIS — D18.01 CHERRY ANGIOMA: ICD-10-CM

## 2025-08-06 DIAGNOSIS — F31.9 BIPOLAR AFFECTIVE DISORDER, REMISSION STATUS UNSPECIFIED (MULTI): ICD-10-CM

## 2025-08-06 PROCEDURE — 99213 OFFICE O/P EST LOW 20 MIN: CPT | Mod: GC | Performed by: DERMATOLOGY

## 2025-08-06 NOTE — Clinical Note
- numerous nevi  - advised that during hormone treatment and pregnancy appearance of nevi may change  - none meet threshold for biopsy today  - Sun protective behavior reviewed and encouraged including the use of over-the-counter sunscreen with SPF30+ daily (reapply every 1.5 hours when outdoors), UPF clothing, broad rimmed hats, sunglasses, and avoidance of midday sun. Home skin monitoring encouraged and how to monitor for skin cancer (changing or new moles, new rapidly growing or non-healing lesions) reviewed. Patient encouraged to call with interval concerns or changes.

## 2025-08-06 NOTE — PROGRESS NOTES
Subjective     Taty Sosa is a 40 y.o. female who presents for the following: Nevus.     Notes many moles on her body that she has had for years but now seem to look a little different and some are catching on her clothes. She recently underwent IVF and is pregnant.     Wears sunscreen on her body when she goes out, SPF 75 generally. Does not wear a daily facial sunscreen. Was screened at a health fair about 5 years ago and told moles were not concerning.          Review of Systems:  No other skin or systemic complaints other than what is documented elsewhere in the note.    The following portions of the chart were reviewed this encounter and updated as appropriate:          Skin Cancer History  Biopsy Log Book  No skin cancers from Specimen Tracking.    Additional History  - denies personal or family history of skin cancer    Specialty Problems    None       Objective   Well appearing patient in no apparent distress; mood and affect are within normal limits.    A waist up skin examination was performed. All findings within normal limits unless otherwise noted below.    Assessment/Plan   Skin Exam  1. NEVUS, NON-NEOPLASTIC  Generalized  Multiple tan brown macules and papules with regular boarders and pigment networks  - numerous nevi  - advised that during hormone treatment and pregnancy appearance of nevi may change  - none meet threshold for biopsy today  - Sun protective behavior reviewed and encouraged including the use of over-the-counter sunscreen with SPF30+ daily (reapply every 1.5 hours when outdoors), UPF clothing, broad rimmed hats, sunglasses, and avoidance of midday sun. Home skin monitoring encouraged and how to monitor for skin cancer (changing or new moles, new rapidly growing or non-healing lesions) reviewed. Patient encouraged to call with interval concerns or changes.     2. JIANG ANGIOMA  Generalized  Multiple dome shaped cherry red papules  - benign, reassured patient        Seen and  discussed with attending physician Dr. Diogo Campos MD, PhD  Resident, Dermatology

## 2025-08-08 ENCOUNTER — TREATMENT (OUTPATIENT)
Dept: PHYSICAL THERAPY | Facility: CLINIC | Age: 40
End: 2025-08-08
Payer: COMMERCIAL

## 2025-08-08 DIAGNOSIS — G44.89 OTHER HEADACHE SYNDROME: ICD-10-CM

## 2025-08-08 PROCEDURE — 20561 NDL INSJ W/O NJX 3+ MUSC: CPT | Mod: GP | Performed by: PHYSICAL THERAPIST

## 2025-08-08 NOTE — PROGRESS NOTES
Physical Therapy Treatment    Patient Name: Taty Sosa  MRN: [id number]  YOB: 1985  Encounter Date: 8/8/2025    Time Entry:  Time Calculation  Start Time: 0840  Stop Time: 0910  Time Calculation (min): 30 min     PT Therapeutic Procedures Time Entry  Needle Insertion w/o Injection 3+ Muscles: 30          Rehab Insurance Information:   Visit Count: 3  POC Visits: 16  Auth Required: Yes  Authorized Visits: [id number]  Auth Date Range: 07/24/25  Through: 12/31/25             Rehab Falls Risk Assessment:  Fall Risk Indicated: No      Problem List Items Addressed This Visit           ICD-10-CM    Other headache syndrome G44.89       Precautions       Additional Precautions and Protocol Details: Pregnancy     Subjective   Pt reports that she felt good after last session did have migraines over the weekend but it seemed to be different. Feels the dry needling changed the headache but unclear what changed. She feels that her headaches are more on the weekend compared to during the week..  Pain reported as 0.           Objective          Noted increased tension along lower cervical paraspinals during dry needling             Activities        Manual Therapy  Manual Therapy Performed: Yes  Manual Therapy Activity 1: Dry Needling       IDN performed this date. Pt educated on risks and benefits of dry needling. Pt provided verbal consent. All universal precautions followed.     1 - 30  mm greater occipital B  1 - 30 mm least occipital B  1 - 30 mm C3-6 paraspinals B   1 - 15 mm supraorbital Htrp B      No adverse response. All IDN guidelines and safety protocols followed.      Assessment/Plan   Assessment: Focus on continued dry needling this date for occipital and trigeminal nerve function with reduciton in muscle tension creating any cervical components of headache complaints. Will conitnue to assess throughout care. Patient tolerated treatment well      Plan: Assess beginning trigmeninal nerve point dry  needling    Active       PT Problem       PT Goal 1 - STG       Start:  07/24/25    Expected End:  08/21/25       Pt will be 50% IND with HEP in 4 weeks in order to progress with therapy.  Pt will reduce headache frequency to no more than 1 times per week in 4 weeks order to improve sleep and work tasks   Pt will reduce headache intensity to no more than 4/10 in 4 weeks to improve sleep and work tasks          PT Goal 2 - LTG       Start:  07/24/25    Expected End:  09/18/25       Pt will be 100% IND with HEP in 8 weeks in order to maintain progress with therapy.   Pt will reduce headache frequency to no more than 2 times per month in order to improve sleep and work tasks.  Pt will reduce headache intensity to no more than 2/10 in 8 weeks to improve sleep and work tasks.

## 2025-08-13 ENCOUNTER — TREATMENT (OUTPATIENT)
Dept: PHYSICAL THERAPY | Facility: CLINIC | Age: 40
End: 2025-08-13
Payer: COMMERCIAL

## 2025-08-13 DIAGNOSIS — G44.89 OTHER HEADACHE SYNDROME: ICD-10-CM

## 2025-08-13 PROCEDURE — 20561 NDL INSJ W/O NJX 3+ MUSC: CPT | Mod: GP | Performed by: PHYSICAL THERAPIST

## 2025-08-21 ENCOUNTER — TREATMENT (OUTPATIENT)
Dept: PHYSICAL THERAPY | Facility: CLINIC | Age: 40
End: 2025-08-21
Payer: COMMERCIAL

## 2025-08-21 ENCOUNTER — ROUTINE PRENATAL (OUTPATIENT)
Dept: OBSTETRICS AND GYNECOLOGY | Facility: CLINIC | Age: 40
End: 2025-08-21
Payer: COMMERCIAL

## 2025-08-21 VITALS — DIASTOLIC BLOOD PRESSURE: 65 MMHG | SYSTOLIC BLOOD PRESSURE: 129 MMHG | BODY MASS INDEX: 26.83 KG/M2 | WEIGHT: 161.2 LBS

## 2025-08-21 DIAGNOSIS — G44.89 OTHER HEADACHE SYNDROME: ICD-10-CM

## 2025-08-21 DIAGNOSIS — M99.08 SOMATIC DYSFUNCTION OF RIB: ICD-10-CM

## 2025-08-21 DIAGNOSIS — R07.81 RIB PAIN ON RIGHT SIDE: ICD-10-CM

## 2025-08-21 DIAGNOSIS — Z3A.26 26 WEEKS GESTATION OF PREGNANCY (HHS-HCC): ICD-10-CM

## 2025-08-21 DIAGNOSIS — O26.899 HEADACHE IN PREGNANCY, ANTEPARTUM (HHS-HCC): ICD-10-CM

## 2025-08-21 DIAGNOSIS — O09.819: Primary | ICD-10-CM

## 2025-08-21 DIAGNOSIS — R51.9 HEADACHE IN PREGNANCY, ANTEPARTUM (HHS-HCC): ICD-10-CM

## 2025-08-21 PROCEDURE — 0502F SUBSEQUENT PRENATAL CARE: CPT | Performed by: OBSTETRICS & GYNECOLOGY

## 2025-08-21 PROCEDURE — 99212 OFFICE O/P EST SF 10 MIN: CPT | Performed by: OBSTETRICS & GYNECOLOGY

## 2025-08-21 PROCEDURE — 97140 MANUAL THERAPY 1/> REGIONS: CPT | Mod: GP | Performed by: PHYSICAL THERAPIST

## 2025-08-21 PROCEDURE — 4200000004 HC PT PHASE II 15 MIN CHG: Mod: GP | Performed by: PHYSICAL THERAPIST

## 2025-08-21 ASSESSMENT — LIFESTYLE VARIABLES
AUDIT TOTAL SCORE: 0
AUDIT-C TOTAL SCORE: 0
HAVE YOU OR SOMEONE ELSE BEEN INJURED AS A RESULT OF YOUR DRINKING: NO
HOW MANY STANDARD DRINKS CONTAINING ALCOHOL DO YOU HAVE ON A TYPICAL DAY: PATIENT DOES NOT DRINK
HAS A RELATIVE, FRIEND, DOCTOR, OR ANOTHER HEALTH PROFESSIONAL EXPRESSED CONCERN ABOUT YOUR DRINKING OR SUGGESTED YOU CUT DOWN: NO
SKIP TO QUESTIONS 9-10: 1
HOW OFTEN DO YOU HAVE A DRINK CONTAINING ALCOHOL: NEVER
HOW OFTEN DO YOU HAVE SIX OR MORE DRINKS ON ONE OCCASION: NEVER

## 2025-08-21 ASSESSMENT — PAIN - FUNCTIONAL ASSESSMENT: PAIN_FUNCTIONAL_ASSESSMENT: 0-10

## 2025-08-21 ASSESSMENT — PAIN SCALES - GENERAL: PAINLEVEL_OUTOF10: 3

## 2025-08-21 ASSESSMENT — PATIENT HEALTH QUESTIONNAIRE - PHQ9
1. LITTLE INTEREST OR PLEASURE IN DOING THINGS: NOT AT ALL
SUM OF ALL RESPONSES TO PHQ9 QUESTIONS 1 & 2: 0
2. FEELING DOWN, DEPRESSED OR HOPELESS: NOT AT ALL

## 2025-08-21 ASSESSMENT — ENCOUNTER SYMPTOMS
LOSS OF SENSATION IN FEET: 0
OCCASIONAL FEELINGS OF UNSTEADINESS: 0
DEPRESSION: 0

## 2025-08-22 LAB
ERYTHROCYTE [DISTWIDTH] IN BLOOD BY AUTOMATED COUNT: 12.3 % (ref 11–15)
FERRITIN SERPL-MCNC: 46 NG/ML (ref 16–154)
GLUCOSE 1H P 50 G GLC PO SERPL-MCNC: 72 MG/DL
HCT VFR BLD AUTO: 33.4 % (ref 35–45)
HGB BLD-MCNC: 11.5 G/DL (ref 11.7–15.5)
MCH RBC QN AUTO: 32.1 PG (ref 27–33)
MCHC RBC AUTO-ENTMCNC: 34.4 G/DL (ref 32–36)
MCV RBC AUTO: 93.3 FL (ref 80–100)
PLATELET # BLD AUTO: 341 THOUSAND/UL (ref 140–400)
PMV BLD REES-ECKER: 10.7 FL (ref 7.5–12.5)
RBC # BLD AUTO: 3.58 MILLION/UL (ref 3.8–5.1)
WBC # BLD AUTO: 11.8 THOUSAND/UL (ref 3.8–10.8)

## 2025-08-25 ENCOUNTER — RESULTS FOLLOW-UP (OUTPATIENT)
Dept: OBSTETRICS AND GYNECOLOGY | Facility: CLINIC | Age: 40
End: 2025-08-25
Payer: COMMERCIAL

## 2025-08-25 DIAGNOSIS — O99.019 ANTEPARTUM ANEMIA: Primary | ICD-10-CM

## 2025-08-25 RX ORDER — FERROUS SULFATE 325(65) MG
325 TABLET, DELAYED RELEASE (ENTERIC COATED) ORAL
COMMUNITY
Start: 2025-08-25 | End: 2026-08-25

## 2025-08-26 ENCOUNTER — PATIENT MESSAGE (OUTPATIENT)
Dept: OBSTETRICS AND GYNECOLOGY | Facility: CLINIC | Age: 40
End: 2025-08-26
Payer: COMMERCIAL

## 2025-08-27 ENCOUNTER — TREATMENT (OUTPATIENT)
Facility: CLINIC | Age: 40
End: 2025-08-27
Payer: COMMERCIAL

## 2025-08-27 DIAGNOSIS — G44.89 OTHER HEADACHE SYNDROME: ICD-10-CM

## 2025-08-27 PROCEDURE — 97530 THERAPEUTIC ACTIVITIES: CPT | Mod: GP | Performed by: PHYSICAL THERAPIST

## 2025-08-27 PROCEDURE — 20561 NDL INSJ W/O NJX 3+ MUSC: CPT | Mod: GP | Performed by: PHYSICAL THERAPIST

## 2025-08-30 DIAGNOSIS — O99.019 ANTEPARTUM ANEMIA: Primary | ICD-10-CM

## 2025-09-03 ENCOUNTER — ROUTINE PRENATAL (OUTPATIENT)
Dept: OBSTETRICS AND GYNECOLOGY | Facility: CLINIC | Age: 40
End: 2025-09-03
Payer: COMMERCIAL

## 2025-09-03 VITALS — SYSTOLIC BLOOD PRESSURE: 128 MMHG | DIASTOLIC BLOOD PRESSURE: 80 MMHG | WEIGHT: 172.2 LBS | BODY MASS INDEX: 28.66 KG/M2

## 2025-09-03 DIAGNOSIS — O09.529 ANTEPARTUM MULTIGRAVIDA OF ADVANCED MATERNAL AGE (HHS-HCC): ICD-10-CM

## 2025-09-03 DIAGNOSIS — O09.819: ICD-10-CM

## 2025-09-03 DIAGNOSIS — Z3A.38 38 WEEKS GESTATION OF PREGNANCY (HHS-HCC): Primary | ICD-10-CM

## 2025-09-03 DIAGNOSIS — R07.81 RIB PAIN ON RIGHT SIDE: ICD-10-CM

## 2025-09-03 DIAGNOSIS — M99.08 SOMATIC DYSFUNCTION OF RIB: ICD-10-CM

## 2025-09-03 DIAGNOSIS — N63.10 MASS OF RIGHT BREAST, UNSPECIFIED QUADRANT: ICD-10-CM

## 2025-09-03 DIAGNOSIS — Z86.79 HISTORY OF CARDIAC DISORDER: ICD-10-CM

## 2025-09-03 LAB
POC APPEARANCE, URINE: CLEAR
POC BILIRUBIN, URINE: NEGATIVE
POC BLOOD, URINE: NEGATIVE
POC COLOR, URINE: YELLOW
POC GLUCOSE, URINE: NEGATIVE MG/DL
POC KETONES, URINE: NEGATIVE MG/DL
POC LEUKOCYTES, URINE: NEGATIVE
POC NITRITE,URINE: NEGATIVE
POC PH, URINE: 6.5 PH
POC PROTEIN, URINE: NEGATIVE MG/DL
POC SPECIFIC GRAVITY, URINE: <=1.005
POC UROBILINOGEN, URINE: 0.2 EU/DL

## 2025-09-03 PROCEDURE — 99213 OFFICE O/P EST LOW 20 MIN: CPT

## 2025-09-03 PROCEDURE — 81003 URINALYSIS AUTO W/O SCOPE: CPT | Performed by: OBSTETRICS & GYNECOLOGY

## 2025-09-03 ASSESSMENT — LIFESTYLE VARIABLES
HAVE YOU OR SOMEONE ELSE BEEN INJURED AS A RESULT OF YOUR DRINKING: NO
HOW OFTEN DO YOU HAVE SIX OR MORE DRINKS ON ONE OCCASION: NEVER
SKIP TO QUESTIONS 9-10: 1
AUDIT-C TOTAL SCORE: 0
HOW MANY STANDARD DRINKS CONTAINING ALCOHOL DO YOU HAVE ON A TYPICAL DAY: PATIENT DOES NOT DRINK
AUDIT TOTAL SCORE: 0
HAS A RELATIVE, FRIEND, DOCTOR, OR ANOTHER HEALTH PROFESSIONAL EXPRESSED CONCERN ABOUT YOUR DRINKING OR SUGGESTED YOU CUT DOWN: NO
HOW OFTEN DO YOU HAVE A DRINK CONTAINING ALCOHOL: NEVER

## 2025-09-03 ASSESSMENT — PATIENT HEALTH QUESTIONNAIRE - PHQ9
1. LITTLE INTEREST OR PLEASURE IN DOING THINGS: NOT AT ALL
2. FEELING DOWN, DEPRESSED OR HOPELESS: NOT AT ALL
SUM OF ALL RESPONSES TO PHQ9 QUESTIONS 1 & 2: 0

## 2025-09-03 ASSESSMENT — ENCOUNTER SYMPTOMS
LOSS OF SENSATION IN FEET: 0
OCCASIONAL FEELINGS OF UNSTEADINESS: 0
DEPRESSION: 0

## 2025-09-03 ASSESSMENT — PAIN SCALES - GENERAL: PAINLEVEL_OUTOF10: 3

## 2025-09-03 ASSESSMENT — PAIN - FUNCTIONAL ASSESSMENT: PAIN_FUNCTIONAL_ASSESSMENT: 0-10

## 2025-09-04 ENCOUNTER — TREATMENT (OUTPATIENT)
Facility: CLINIC | Age: 40
End: 2025-09-04
Payer: COMMERCIAL

## 2025-09-04 DIAGNOSIS — G44.89 OTHER HEADACHE SYNDROME: ICD-10-CM

## 2025-09-04 DIAGNOSIS — R10.2 PELVIC PAIN: Primary | ICD-10-CM

## 2025-09-04 LAB
IRON SATN MFR SERPL: 19 % (CALC) (ref 16–45)
IRON SERPL-MCNC: 79 MCG/DL (ref 40–190)
TIBC SERPL-MCNC: 419 MCG/DL (CALC) (ref 250–450)

## 2025-09-04 PROCEDURE — 20560 NDL INSJ W/O NJX 1 OR 2 MUSC: CPT | Mod: GP | Performed by: PHYSICAL THERAPIST

## 2025-09-04 PROCEDURE — 97530 THERAPEUTIC ACTIVITIES: CPT | Mod: GP | Performed by: PHYSICAL THERAPIST

## 2025-09-29 ENCOUNTER — APPOINTMENT (OUTPATIENT)
Dept: PEDIATRICS | Facility: CLINIC | Age: 40
End: 2025-09-29
Payer: COMMERCIAL

## 2026-02-03 ENCOUNTER — APPOINTMENT (OUTPATIENT)
Dept: NEUROLOGY | Facility: CLINIC | Age: 41
End: 2026-02-03
Payer: COMMERCIAL